# Patient Record
Sex: MALE | Race: OTHER | HISPANIC OR LATINO | URBAN - METROPOLITAN AREA
[De-identification: names, ages, dates, MRNs, and addresses within clinical notes are randomized per-mention and may not be internally consistent; named-entity substitution may affect disease eponyms.]

---

## 2024-06-20 ENCOUNTER — INPATIENT (INPATIENT)
Facility: HOSPITAL | Age: 24
LOS: 28 days | Discharge: HOME | DRG: 872 | End: 2024-07-19
Attending: INTERNAL MEDICINE | Admitting: STUDENT IN AN ORGANIZED HEALTH CARE EDUCATION/TRAINING PROGRAM
Payer: MEDICAID

## 2024-06-20 VITALS
WEIGHT: 175.05 LBS | RESPIRATION RATE: 18 BRPM | SYSTOLIC BLOOD PRESSURE: 115 MMHG | HEART RATE: 105 BPM | TEMPERATURE: 99 F | OXYGEN SATURATION: 95 % | DIASTOLIC BLOOD PRESSURE: 40 MMHG

## 2024-06-20 LAB
ALBUMIN SERPL ELPH-MCNC: 3.1 G/DL — LOW (ref 3.4–5)
ALP SERPL-CCNC: 85 U/L — SIGNIFICANT CHANGE UP (ref 40–120)
ALT FLD-CCNC: 17 U/L — SIGNIFICANT CHANGE UP (ref 12–42)
ANION GAP SERPL CALC-SCNC: 9 MMOL/L — SIGNIFICANT CHANGE UP (ref 9–16)
APPEARANCE UR: CLEAR — SIGNIFICANT CHANGE UP
APTT BLD: 30.9 SEC — SIGNIFICANT CHANGE UP (ref 24.5–35.6)
AST SERPL-CCNC: 17 U/L — SIGNIFICANT CHANGE UP (ref 15–37)
BASOPHILS # BLD AUTO: 0.05 K/UL — SIGNIFICANT CHANGE UP (ref 0–0.2)
BASOPHILS NFR BLD AUTO: 0.3 % — SIGNIFICANT CHANGE UP (ref 0–2)
BILIRUB SERPL-MCNC: 0.3 MG/DL — SIGNIFICANT CHANGE UP (ref 0.2–1.2)
BILIRUB UR-MCNC: NEGATIVE — SIGNIFICANT CHANGE UP
BUN SERPL-MCNC: 14 MG/DL — SIGNIFICANT CHANGE UP (ref 7–23)
CALCIUM SERPL-MCNC: 8.8 MG/DL — SIGNIFICANT CHANGE UP (ref 8.5–10.5)
CHLORIDE SERPL-SCNC: 101 MMOL/L — SIGNIFICANT CHANGE UP (ref 96–108)
CO2 SERPL-SCNC: 25 MMOL/L — SIGNIFICANT CHANGE UP (ref 22–31)
COLOR SPEC: YELLOW — SIGNIFICANT CHANGE UP
CREAT SERPL-MCNC: 1.15 MG/DL — SIGNIFICANT CHANGE UP (ref 0.5–1.3)
DIFF PNL FLD: NEGATIVE — SIGNIFICANT CHANGE UP
EGFR: 91 ML/MIN/1.73M2 — SIGNIFICANT CHANGE UP
EOSINOPHIL # BLD AUTO: 0.22 K/UL — SIGNIFICANT CHANGE UP (ref 0–0.5)
EOSINOPHIL NFR BLD AUTO: 1.1 % — SIGNIFICANT CHANGE UP (ref 0–6)
FLUAV AG NPH QL: SIGNIFICANT CHANGE UP
FLUBV AG NPH QL: SIGNIFICANT CHANGE UP
GLUCOSE SERPL-MCNC: 153 MG/DL — HIGH (ref 70–99)
GLUCOSE UR QL: NEGATIVE MG/DL — SIGNIFICANT CHANGE UP
GRAM STN FLD: ABNORMAL
HCT VFR BLD CALC: 41.1 % — SIGNIFICANT CHANGE UP (ref 39–50)
HGB BLD-MCNC: 14.3 G/DL — SIGNIFICANT CHANGE UP (ref 13–17)
IMM GRANULOCYTES NFR BLD AUTO: 0.5 % — SIGNIFICANT CHANGE UP (ref 0–0.9)
INR BLD: 1.41 — HIGH (ref 0.85–1.18)
KETONES UR-MCNC: ABNORMAL MG/DL
LACTATE BLDV-MCNC: 1.6 MMOL/L — SIGNIFICANT CHANGE UP (ref 0.5–2)
LEUKOCYTE ESTERASE UR-ACNC: NEGATIVE — SIGNIFICANT CHANGE UP
LYMPHOCYTES # BLD AUTO: 1.6 K/UL — SIGNIFICANT CHANGE UP (ref 1–3.3)
LYMPHOCYTES # BLD AUTO: 8.2 % — LOW (ref 13–44)
MCHC RBC-ENTMCNC: 31.9 PG — SIGNIFICANT CHANGE UP (ref 27–34)
MCHC RBC-ENTMCNC: 34.8 GM/DL — SIGNIFICANT CHANGE UP (ref 32–36)
MCV RBC AUTO: 91.7 FL — SIGNIFICANT CHANGE UP (ref 80–100)
MONOCYTES # BLD AUTO: 1.23 K/UL — HIGH (ref 0–0.9)
MONOCYTES NFR BLD AUTO: 6.3 % — SIGNIFICANT CHANGE UP (ref 2–14)
NEUTROPHILS # BLD AUTO: 16.21 K/UL — HIGH (ref 1.8–7.4)
NEUTROPHILS NFR BLD AUTO: 83.6 % — HIGH (ref 43–77)
NITRITE UR-MCNC: NEGATIVE — SIGNIFICANT CHANGE UP
NRBC # BLD: 0 /100 WBCS — SIGNIFICANT CHANGE UP (ref 0–0)
PH UR: 5.5 — SIGNIFICANT CHANGE UP (ref 5–8)
PLATELET # BLD AUTO: 310 K/UL — SIGNIFICANT CHANGE UP (ref 150–400)
POTASSIUM SERPL-MCNC: 3.3 MMOL/L — LOW (ref 3.5–5.3)
POTASSIUM SERPL-SCNC: 3.3 MMOL/L — LOW (ref 3.5–5.3)
PROT SERPL-MCNC: 7.3 G/DL — SIGNIFICANT CHANGE UP (ref 6.4–8.2)
PROT UR-MCNC: NEGATIVE MG/DL — SIGNIFICANT CHANGE UP
PROTHROM AB SERPL-ACNC: 15.4 SEC — HIGH (ref 9.5–13)
RBC # BLD: 4.48 M/UL — SIGNIFICANT CHANGE UP (ref 4.2–5.8)
RBC # FLD: 12.5 % — SIGNIFICANT CHANGE UP (ref 10.3–14.5)
RSV RNA NPH QL NAA+NON-PROBE: SIGNIFICANT CHANGE UP
SARS-COV-2 RNA SPEC QL NAA+PROBE: SIGNIFICANT CHANGE UP
SODIUM SERPL-SCNC: 135 MMOL/L — SIGNIFICANT CHANGE UP (ref 132–145)
SP GR SPEC: 1.02 — SIGNIFICANT CHANGE UP (ref 1–1.03)
SPECIMEN SOURCE: SIGNIFICANT CHANGE UP
UROBILINOGEN FLD QL: 1 MG/DL — SIGNIFICANT CHANGE UP (ref 0.2–1)
WBC # BLD: 19.4 K/UL — HIGH (ref 3.8–10.5)
WBC # FLD AUTO: 19.4 K/UL — HIGH (ref 3.8–10.5)

## 2024-06-20 PROCEDURE — 71045 X-RAY EXAM CHEST 1 VIEW: CPT | Mod: 26

## 2024-06-20 PROCEDURE — 99285 EMERGENCY DEPT VISIT HI MDM: CPT

## 2024-06-20 PROCEDURE — 72132 CT LUMBAR SPINE W/DYE: CPT | Mod: 26,MC

## 2024-06-20 RX ORDER — VANCOMYCIN HYDROCHLORIDE 50 MG/ML
1250 KIT ORAL ONCE
Refills: 0 | Status: COMPLETED | OUTPATIENT
Start: 2024-06-20 | End: 2024-06-20

## 2024-06-20 RX ORDER — ACETAMINOPHEN 325 MG
650 TABLET ORAL ONCE
Refills: 0 | Status: COMPLETED | OUTPATIENT
Start: 2024-06-20 | End: 2024-06-20

## 2024-06-20 RX ORDER — VANCOMYCIN HYDROCHLORIDE 50 MG/ML
1000 KIT ORAL ONCE
Refills: 0 | Status: DISCONTINUED | OUTPATIENT
Start: 2024-06-20 | End: 2024-06-20

## 2024-06-20 RX ORDER — CEFTRIAXONE SODIUM 500 MG
1000 VIAL (EA) INJECTION ONCE
Refills: 0 | Status: COMPLETED | OUTPATIENT
Start: 2024-06-20 | End: 2024-06-20

## 2024-06-20 RX ORDER — SODIUM CHLORIDE 0.9 % (FLUSH) 0.9 %
2500 SYRINGE (ML) INJECTION ONCE
Refills: 0 | Status: COMPLETED | OUTPATIENT
Start: 2024-06-20 | End: 2024-06-20

## 2024-06-20 RX ORDER — MORPHINE SULFATE 100 MG/1
4 TABLET, EXTENDED RELEASE ORAL ONCE
Refills: 0 | Status: DISCONTINUED | OUTPATIENT
Start: 2024-06-20 | End: 2024-06-20

## 2024-06-20 RX ADMIN — Medication 600 MILLIGRAM(S): at 16:44

## 2024-06-20 RX ADMIN — VANCOMYCIN HYDROCHLORIDE 1250 MILLIGRAM(S): KIT at 22:28

## 2024-06-20 RX ADMIN — Medication 600 MILLIGRAM(S): at 20:44

## 2024-06-20 RX ADMIN — Medication 100 MILLIGRAM(S): at 09:21

## 2024-06-20 RX ADMIN — VANCOMYCIN HYDROCHLORIDE 1250 MILLIGRAM(S): KIT at 20:44

## 2024-06-20 RX ADMIN — Medication 600 MILLIGRAM(S): at 22:35

## 2024-06-20 RX ADMIN — Medication 2500 MILLILITER(S): at 20:44

## 2024-06-20 RX ADMIN — MORPHINE SULFATE 4 MILLIGRAM(S): 100 TABLET, EXTENDED RELEASE ORAL at 20:44

## 2024-06-20 RX ADMIN — Medication 650 MILLIGRAM(S): at 23:32

## 2024-06-20 RX ADMIN — Medication 1000 MILLIGRAM(S): at 20:43

## 2024-06-20 RX ADMIN — Medication 650 MILLIGRAM(S): at 20:43

## 2024-06-20 RX ADMIN — Medication 650 MILLIGRAM(S): at 16:44

## 2024-06-20 RX ADMIN — Medication 2500 MILLILITER(S): at 09:27

## 2024-06-20 RX ADMIN — Medication 600 MILLIGRAM(S): at 09:19

## 2024-06-20 RX ADMIN — MORPHINE SULFATE 4 MILLIGRAM(S): 100 TABLET, EXTENDED RELEASE ORAL at 10:18

## 2024-06-20 RX ADMIN — Medication 650 MILLIGRAM(S): at 22:35

## 2024-06-20 RX ADMIN — VANCOMYCIN HYDROCHLORIDE 166.67 MILLIGRAM(S): KIT at 09:46

## 2024-06-20 RX ADMIN — Medication 600 MILLIGRAM(S): at 23:33

## 2024-06-20 RX ADMIN — Medication 650 MILLIGRAM(S): at 09:19

## 2024-06-20 RX ADMIN — VANCOMYCIN HYDROCHLORIDE 166.67 MILLIGRAM(S): KIT at 20:47

## 2024-06-20 NOTE — SBIRT NOTE ADULT - NSSBIRTALCPOSREINDET_GEN_A_CORE
Patient reports minimal social ETOH use. Patient reports 1-2 times per month. No resources wanted/needed at this time.

## 2024-06-20 NOTE — ED PROVIDER NOTE - PHYSICAL EXAMINATION
VSS in NAD non toxic appearing   NCAT EOMI PERRL OP clear  heart RRR no murmur   lungs CTA no wheezing no rales no rhonchi   abd soft NT ND no CVAT no guarding no rebound   Back with open wound, no fluctuance, no obvious abscess, surrounding area tender to touch with open wound with granulation tissue.  Mild surrounding erythema and induration, tender to touch with no fluctuance.  normal neuro exam CN I-XII grossly intact, no groos motor or sensory deficits  no peripheral c/c/e

## 2024-06-20 NOTE — ED ADULT TRIAGE NOTE - CHIEF COMPLAINT QUOTE
Pt BIBEMS from street complaining of wound check to lower back. + drainage. + redness. Pt states that he popped it earlier today. Admits to  using crack today.

## 2024-06-20 NOTE — ED PROVIDER NOTE - OBJECTIVE STATEMENT
23-year-old male with history of IV drug use, cocaine and crystal meth, with complaints of painful ulceration with purulent drainage of mid lower back, associated with fever/chills, mild nausea no vomiting, fatigue weakness and severe pain.  He has been squeezing the lesion trying to get the pus out but the wound appears to be increasing in size and draining thick pus. Sepsis criteria alerted on arrival to the ED and IV antibiotics started.

## 2024-06-20 NOTE — SBIRT NOTE ADULT - NSSBIRTDRGPASSREFTXDET_GEN_A_CORE
Patient reports use of cocaine and meth about 2 times per week. Education was provided to patient. Patient requesting for substance abuse resources. SW to provide print you resources available to the patient.

## 2024-06-21 DIAGNOSIS — E83.39 OTHER DISORDERS OF PHOSPHORUS METABOLISM: ICD-10-CM

## 2024-06-21 DIAGNOSIS — L02.91 CUTANEOUS ABSCESS, UNSPECIFIED: ICD-10-CM

## 2024-06-21 DIAGNOSIS — F19.10 OTHER PSYCHOACTIVE SUBSTANCE ABUSE, UNCOMPLICATED: ICD-10-CM

## 2024-06-21 DIAGNOSIS — E87.6 HYPOKALEMIA: ICD-10-CM

## 2024-06-21 DIAGNOSIS — L03.90 CELLULITIS, UNSPECIFIED: ICD-10-CM

## 2024-06-21 DIAGNOSIS — F19.20 OTHER PSYCHOACTIVE SUBSTANCE DEPENDENCE, UNCOMPLICATED: ICD-10-CM

## 2024-06-21 DIAGNOSIS — Z20.828 CONTACT WITH AND (SUSPECTED) EXPOSURE TO OTHER VIRAL COMMUNICABLE DISEASES: ICD-10-CM

## 2024-06-21 DIAGNOSIS — Z59.00 HOMELESSNESS UNSPECIFIED: ICD-10-CM

## 2024-06-21 DIAGNOSIS — A41.9 SEPSIS, UNSPECIFIED ORGANISM: ICD-10-CM

## 2024-06-21 DIAGNOSIS — R78.81 BACTEREMIA: ICD-10-CM

## 2024-06-21 DIAGNOSIS — Z29.9 ENCOUNTER FOR PROPHYLACTIC MEASURES, UNSPECIFIED: ICD-10-CM

## 2024-06-21 LAB
ALBUMIN SERPL ELPH-MCNC: 3.3 G/DL — SIGNIFICANT CHANGE UP (ref 3.3–5)
ALP SERPL-CCNC: 77 U/L — SIGNIFICANT CHANGE UP (ref 40–120)
ALT FLD-CCNC: 25 U/L — SIGNIFICANT CHANGE UP (ref 10–45)
AMPHET UR-MCNC: NEGATIVE — SIGNIFICANT CHANGE UP
ANION GAP SERPL CALC-SCNC: 7 MMOL/L — SIGNIFICANT CHANGE UP (ref 5–17)
AST SERPL-CCNC: 32 U/L — SIGNIFICANT CHANGE UP (ref 10–40)
BARBITURATES UR SCN-MCNC: NEGATIVE — SIGNIFICANT CHANGE UP
BASOPHILS # BLD AUTO: 0.03 K/UL — SIGNIFICANT CHANGE UP (ref 0–0.2)
BASOPHILS NFR BLD AUTO: 0.4 % — SIGNIFICANT CHANGE UP (ref 0–2)
BENZODIAZ UR-MCNC: NEGATIVE — SIGNIFICANT CHANGE UP
BILIRUB SERPL-MCNC: 0.2 MG/DL — SIGNIFICANT CHANGE UP (ref 0.2–1.2)
BUN SERPL-MCNC: 5 MG/DL — LOW (ref 7–23)
CALCIUM SERPL-MCNC: 8.8 MG/DL — SIGNIFICANT CHANGE UP (ref 8.4–10.5)
CHLORIDE SERPL-SCNC: 105 MMOL/L — SIGNIFICANT CHANGE UP (ref 96–108)
CO2 SERPL-SCNC: 24 MMOL/L — SIGNIFICANT CHANGE UP (ref 22–31)
COCAINE METAB.OTHER UR-MCNC: POSITIVE
CREAT SERPL-MCNC: 0.8 MG/DL — SIGNIFICANT CHANGE UP (ref 0.5–1.3)
EGFR: 126 ML/MIN/1.73M2 — SIGNIFICANT CHANGE UP
EOSINOPHIL # BLD AUTO: 0.12 K/UL — SIGNIFICANT CHANGE UP (ref 0–0.5)
EOSINOPHIL NFR BLD AUTO: 1.4 % — SIGNIFICANT CHANGE UP (ref 0–6)
GLUCOSE SERPL-MCNC: 135 MG/DL — HIGH (ref 70–99)
GRAM STN FLD: ABNORMAL
HAV IGM SER-ACNC: SIGNIFICANT CHANGE UP
HBV CORE AB SER-ACNC: SIGNIFICANT CHANGE UP
HBV CORE IGM SER-ACNC: SIGNIFICANT CHANGE UP
HBV SURFACE AB SER-ACNC: SIGNIFICANT CHANGE UP
HBV SURFACE AG SER-ACNC: SIGNIFICANT CHANGE UP
HCT VFR BLD CALC: 43.2 % — SIGNIFICANT CHANGE UP (ref 39–50)
HCV AB S/CO SERPL IA: 0.06 S/CO — SIGNIFICANT CHANGE UP
HCV AB SERPL-IMP: SIGNIFICANT CHANGE UP
HGB BLD-MCNC: 14.5 G/DL — SIGNIFICANT CHANGE UP (ref 13–17)
HIV 1+2 AB+HIV1 P24 AG SERPL QL IA: SIGNIFICANT CHANGE UP
IMM GRANULOCYTES NFR BLD AUTO: 0.5 % — SIGNIFICANT CHANGE UP (ref 0–0.9)
LYMPHOCYTES # BLD AUTO: 0.9 K/UL — LOW (ref 1–3.3)
LYMPHOCYTES # BLD AUTO: 10.6 % — LOW (ref 13–44)
MAGNESIUM SERPL-MCNC: 1.9 MG/DL — SIGNIFICANT CHANGE UP (ref 1.6–2.6)
MCHC RBC-ENTMCNC: 31.3 PG — SIGNIFICANT CHANGE UP (ref 27–34)
MCHC RBC-ENTMCNC: 33.6 GM/DL — SIGNIFICANT CHANGE UP (ref 32–36)
MCV RBC AUTO: 93.3 FL — SIGNIFICANT CHANGE UP (ref 80–100)
METHADONE UR-MCNC: NEGATIVE — SIGNIFICANT CHANGE UP
METHOD TYPE: SIGNIFICANT CHANGE UP
MONOCYTES # BLD AUTO: 0.53 K/UL — SIGNIFICANT CHANGE UP (ref 0–0.9)
MONOCYTES NFR BLD AUTO: 6.2 % — SIGNIFICANT CHANGE UP (ref 2–14)
MRSA SPEC QL CULT: SIGNIFICANT CHANGE UP
NEUTROPHILS # BLD AUTO: 6.91 K/UL — SIGNIFICANT CHANGE UP (ref 1.8–7.4)
NEUTROPHILS NFR BLD AUTO: 80.9 % — HIGH (ref 43–77)
NRBC # BLD: 0 /100 WBCS — SIGNIFICANT CHANGE UP (ref 0–0)
OPIATES UR-MCNC: NEGATIVE — SIGNIFICANT CHANGE UP
PCP SPEC-MCNC: SIGNIFICANT CHANGE UP
PCP UR-MCNC: NEGATIVE — SIGNIFICANT CHANGE UP
PHOSPHATE SERPL-MCNC: 1.7 MG/DL — LOW (ref 2.5–4.5)
PLATELET # BLD AUTO: 244 K/UL — SIGNIFICANT CHANGE UP (ref 150–400)
POTASSIUM SERPL-MCNC: 4.4 MMOL/L — SIGNIFICANT CHANGE UP (ref 3.5–5.3)
POTASSIUM SERPL-SCNC: 4.4 MMOL/L — SIGNIFICANT CHANGE UP (ref 3.5–5.3)
PROT SERPL-MCNC: 6.4 G/DL — SIGNIFICANT CHANGE UP (ref 6–8.3)
RBC # BLD: 4.63 M/UL — SIGNIFICANT CHANGE UP (ref 4.2–5.8)
RBC # FLD: 13.3 % — SIGNIFICANT CHANGE UP (ref 10.3–14.5)
SODIUM SERPL-SCNC: 136 MMOL/L — SIGNIFICANT CHANGE UP (ref 135–145)
SPECIMEN SOURCE: SIGNIFICANT CHANGE UP
THC UR QL: POSITIVE
VANCOMYCIN TROUGH SERPL-MCNC: 10 UG/ML — SIGNIFICANT CHANGE UP (ref 10–20)
WBC # BLD: 8.53 K/UL — SIGNIFICANT CHANGE UP (ref 3.8–10.5)
WBC # FLD AUTO: 8.53 K/UL — SIGNIFICANT CHANGE UP (ref 3.8–10.5)

## 2024-06-21 PROCEDURE — 99223 1ST HOSP IP/OBS HIGH 75: CPT | Mod: GC

## 2024-06-21 PROCEDURE — 99254 IP/OBS CNSLTJ NEW/EST MOD 60: CPT

## 2024-06-21 RX ORDER — PANTOPRAZOLE SODIUM 40 MG/10ML
40 INJECTION, POWDER, FOR SOLUTION INTRAVENOUS
Refills: 0 | Status: DISCONTINUED | OUTPATIENT
Start: 2024-06-21 | End: 2024-07-19

## 2024-06-21 RX ORDER — ENOXAPARIN SODIUM 100 MG/ML
40 INJECTION SUBCUTANEOUS EVERY 24 HOURS
Refills: 0 | Status: DISCONTINUED | OUTPATIENT
Start: 2024-06-21 | End: 2024-07-19

## 2024-06-21 RX ORDER — VANCOMYCIN HYDROCHLORIDE 50 MG/ML
1250 KIT ORAL EVERY 8 HOURS
Refills: 0 | Status: DISCONTINUED | OUTPATIENT
Start: 2024-06-21 | End: 2024-06-22

## 2024-06-21 RX ORDER — CEFAZOLIN 10 G/1
2000 INJECTION, POWDER, FOR SOLUTION INTRAVENOUS EVERY 8 HOURS
Refills: 0 | Status: DISCONTINUED | OUTPATIENT
Start: 2024-06-21 | End: 2024-06-21

## 2024-06-21 RX ORDER — OXYCODONE HYDROCHLORIDE 100 MG/5ML
2.5 SOLUTION ORAL EVERY 4 HOURS
Refills: 0 | Status: DISCONTINUED | OUTPATIENT
Start: 2024-06-21 | End: 2024-06-27

## 2024-06-21 RX ORDER — POTASSIUM CHLORIDE 600 MG/1
40 TABLET, FILM COATED, EXTENDED RELEASE ORAL
Refills: 0 | Status: COMPLETED | OUTPATIENT
Start: 2024-06-21 | End: 2024-06-21

## 2024-06-21 RX ORDER — VANCOMYCIN HYDROCHLORIDE 50 MG/ML
1250 KIT ORAL ONCE
Refills: 0 | Status: COMPLETED | OUTPATIENT
Start: 2024-06-21 | End: 2024-06-21

## 2024-06-21 RX ORDER — ACETAMINOPHEN 325 MG
650 TABLET ORAL EVERY 6 HOURS
Refills: 0 | Status: DISCONTINUED | OUTPATIENT
Start: 2024-06-21 | End: 2024-07-19

## 2024-06-21 RX ORDER — MAGNESIUM SULFATE 100 %
1 POWDER (GRAM) MISCELLANEOUS ONCE
Refills: 0 | Status: COMPLETED | OUTPATIENT
Start: 2024-06-21 | End: 2024-06-21

## 2024-06-21 RX ORDER — VANCOMYCIN HYDROCHLORIDE 50 MG/ML
1250 KIT ORAL ONCE
Refills: 0 | Status: DISCONTINUED | OUTPATIENT
Start: 2024-06-21 | End: 2024-06-21

## 2024-06-21 RX ADMIN — POTASSIUM CHLORIDE 40 MILLIEQUIVALENT(S): 600 TABLET, FILM COATED, EXTENDED RELEASE ORAL at 06:02

## 2024-06-21 RX ADMIN — Medication 400 MILLIGRAM(S): at 11:10

## 2024-06-21 RX ADMIN — Medication 400 MILLIGRAM(S): at 05:00

## 2024-06-21 RX ADMIN — Medication 400 MILLIGRAM(S): at 04:00

## 2024-06-21 RX ADMIN — VANCOMYCIN HYDROCHLORIDE 166.67 MILLIGRAM(S): KIT at 12:20

## 2024-06-21 RX ADMIN — VANCOMYCIN HYDROCHLORIDE 166.67 MILLIGRAM(S): KIT at 19:27

## 2024-06-21 RX ADMIN — CEFAZOLIN 100 MILLIGRAM(S): 10 INJECTION, POWDER, FOR SOLUTION INTRAVENOUS at 06:00

## 2024-06-21 RX ADMIN — Medication 400 MILLIGRAM(S): at 20:02

## 2024-06-21 RX ADMIN — ENOXAPARIN SODIUM 40 MILLIGRAM(S): 100 INJECTION SUBCUTANEOUS at 06:01

## 2024-06-21 RX ADMIN — Medication 400 MILLIGRAM(S): at 19:02

## 2024-06-21 RX ADMIN — Medication 400 MILLIGRAM(S): at 10:10

## 2024-06-21 RX ADMIN — Medication 100 GRAM(S): at 10:10

## 2024-06-21 RX ADMIN — POTASSIUM CHLORIDE 40 MILLIEQUIVALENT(S): 600 TABLET, FILM COATED, EXTENDED RELEASE ORAL at 04:00

## 2024-06-21 RX ADMIN — PANTOPRAZOLE SODIUM 40 MILLIGRAM(S): 40 INJECTION, POWDER, FOR SOLUTION INTRAVENOUS at 06:42

## 2024-06-21 SDOH — ECONOMIC STABILITY - HOUSING INSECURITY: HOMELESSNESS UNSPECIFIED: Z59.00

## 2024-06-21 NOTE — PATIENT PROFILE ADULT - FALL HARM RISK - UNIVERSAL INTERVENTIONS
Bed in lowest position, wheels locked, appropriate side rails in place/Call bell, personal items and telephone in reach/Instruct patient to call for assistance before getting out of bed or chair/Non-slip footwear when patient is out of bed/[place] to call system/Physically safe environment - no spills, clutter or unnecessary equipment/Purposeful Proactive Rounding/Room/bathroom lighting operational, light cord in reach

## 2024-06-21 NOTE — H&P ADULT - PROBLEM SELECTOR PLAN 2
Patient with area of swelling, redness, erythema near sacrum.  On exam 3cm x 5cm area of ulceration with purulence and surrounding redness.  CT without evidence of abscess, shows midline dermal thickening with small central depression/laceration in the mid sacral region. Underlying infiltration of the subcutaneous fat and subcutaneous edema extending superiorly to the L1-L2 level.    - Plan as above

## 2024-06-21 NOTE — PROGRESS NOTE ADULT - SUBJECTIVE AND OBJECTIVE BOX
Consultation Requested by: medicine    Patient is a 23y old  Male who presents with a chief complaint of   HPI:  23-year-old male with substance use disorder with complaints of painful ulceration with purulent drainage of mid lower back.  He reports the lesion started 4 days ago, was initially small, he picked at it after which it grew in size and started to drain.  He reports fever, chills, no nausea, vomiting. For the last 2 days pain has been too intense that he hasn't been able to lie on his back.  Reports he smokes cocaine and meth but no IV drug use.  He does not have any medical conditions, does not take any medications.  ROS other medications.     ED Course:  Vitals: Temp 99.2,  -->90, /40, RR 18: SaO2 95% room air  Labs: WBC 19.4, K 3.3, Lactate 1.6, Abscess culture gram positive cocci in pairs  CT Lumbar Spine: Midline dermal thickening with small central depression/laceration in the mid sacral region. Underlying infiltration of the subcutaneous fat and subcutaneous edema extending superiorly to the L1-L2 level.   Intervention: Tylenol 650mg po x 5, Ibuprofen 600mg po x 3, Morphine 4mg IV, Percocet 5/325, Ceftriaxone 1g IV, Vancomycin 1250mg IV x 2, NaCl bolus 2.5     (21 Jun 2024 01:50)    Subjective:  Patient reports he had a small wound possibly infected hair follicle which quickly began enlarging and becoming more painful over the course of 4 days. Was using creams which did not help, pain got so severe he came to the ED.   Denies fever, chills. Other ROS negative. Has other scattered lesions over his body which he reports have been slow to heal. Denies IVDU.         Allergies    No Known Allergies    Intolerances      Antimicrobials:      Other Medications:  acetaminophen     Tablet .. 650 milliGRAM(s) Oral every 6 hours PRN  enoxaparin Injectable 40 milliGRAM(s) SubCutaneous every 24 hours  ibuprofen  Tablet. 400 milliGRAM(s) Oral every 6 hours PRN  oxyCODONE    IR 2.5 milliGRAM(s) Oral every 4 hours PRN  pantoprazole    Tablet 40 milliGRAM(s) Oral before breakfast      FAMILY HISTORY:    PAST MEDICAL & SURGICAL HISTORY:    SOCIAL HISTORY:    IMMUNIZATIONS  [] Up to Date		[] Not Up to Date:  Recent Immunizations:	[] No	[] Yes:    Daily Height in cm: 167.64 (21 Jun 2024 00:34)    Daily   Head Circumference:  Vital Signs Last 24 Hrs  T(C): 36.9 (21 Jun 2024 09:52), Max: 39.4 (20 Jun 2024 17:05)  T(F): 98.4 (21 Jun 2024 09:52), Max: 103 (20 Jun 2024 17:05)  HR: 100 (21 Jun 2024 09:52) (74 - 101)  BP: 120/68 (21 Jun 2024 09:52) (100/55 - 129/70)  BP(mean): --  RR: 18 (21 Jun 2024 09:52) (17 - 20)  SpO2: 97% (21 Jun 2024 09:52) (95% - 98%)    Parameters below as of 21 Jun 2024 09:52  Patient On (Oxygen Delivery Method): room air        PHYSICAL EXAM  CONSTITUTIONAL: Well groomed, no apparent distress  EYES: No conjunctival or scleral injection, non-icteric  ENMT: Oral mucosa with moist membranes. Normal dentition; no pharyngeal injection or exudates  RESP: No respiratory distress, no use of accessory muscles; CTA b/l, no WRR  CV: RRR, +S1S2, no MRG; no JVD; no peripheral edema  GI: Soft, NT, ND, no rebound, no guarding; no palpable masses; no hepatosplenomegaly; no hernia palpated  SKIN: approx 5x5cm raised, erythematous, tender wound with purulence, no fluctuance located just above gluteal cleft; multiple scattered wounds throughout arms and legs appearing to be infected hair follicles, tender, purulent fluid draining  PSYCH: Appropriate insight/judgment; A+O x 3, mood and affect appropriate, recent/remote memory intact      Lab Results:                        14.5   8.53  )-----------( 244      ( 21 Jun 2024 07:11 )             43.2     06-21    136  |  105  |  5<L>  ----------------------------<  135<H>  4.4   |  24  |  0.80    Ca    8.8      21 Jun 2024 07:11  Phos  1.7     06-21  Mg     1.9     06-21    TPro  6.4  /  Alb  3.3  /  TBili  0.2  /  DBili  x   /  AST  32  /  ALT  25  /  AlkPhos  77  06-21    LIVER FUNCTIONS - ( 21 Jun 2024 07:11 )  Alb: 3.3 g/dL / Pro: 6.4 g/dL / ALK PHOS: 77 U/L / ALT: 25 U/L / AST: 32 U/L / GGT: x           PT/INR - ( 20 Jun 2024 08:53 )   PT: 15.4 sec;   INR: 1.41          PTT - ( 20 Jun 2024 08:53 )  PTT:30.9 sec  Urinalysis Basic - ( 21 Jun 2024 07:11 )    Color: x / Appearance: x / SG: x / pH: x  Gluc: 135 mg/dL / Ketone: x  / Bili: x / Urobili: x   Blood: x / Protein: x / Nitrite: x   Leuk Esterase: x / RBC: x / WBC x   Sq Epi: x / Non Sq Epi: x / Bacteria: x

## 2024-06-21 NOTE — PROGRESS NOTE ADULT - ASSESSMENT
23-year-old male with substance use disorder (no IVDU) presents complaints of painful ulceration with purulent drainage of mid lower back, admitted for sepsis 2/2 cellulitis.     6/20 Blood cx: MRSA  6/20 Wound cx - gram stain gram pos cocci in pairs    # MRSA Bacteremia  # Folliculitis  Pt presenting for 4 days of worsening wound in sacral area - what began as infected hair follicle/pimple > pt popped it at home and wound began to rapidly increase in size and become more painful. It is draining purulent fluid. Suspect that skin wound served as point of entry for MRSA into bloodstream, blood cxs now growing MRSA. Pt denies IVDU. Afebrile, WBC downtrending. On exam, wound is erythematous with purulence and no areas of fluctuance. Pt also w scattered wounds throughout arms and legs.   Recommendations:   - increase vancomycin to 1250mg IV q8  - vanc trough 6/22 12pm  - surveillance blood cxs until clear    ID team 1 will continue to follow  Case discussed with attending physician Dr Shaw

## 2024-06-21 NOTE — H&P ADULT - ASSESSMENT
23-year-old male with substance use disorder with complaints of painful ulceration with purulent drainage of mid lower back.

## 2024-06-21 NOTE — PROGRESS NOTE ADULT - PROBLEM SELECTOR PLAN 2
skin lesions initially c/f monkeypox, but now less likely given +MRSA bacteremia  -- cont. airborne precautions for now  -- f/u Mpox PCR  -- appreciate ID recs

## 2024-06-21 NOTE — H&P ADULT - ATTENDING COMMENTS
Pt. seen and examined by me earlier today; I have read Dr. Pisano's H&P, I agree w/ her findings and plan of care as documented; see my progress note for updates

## 2024-06-21 NOTE — PROGRESS NOTE ADULT - PROBLEM SELECTOR PLAN 3
Pt. reports smoking crack cocaine and meth; he denies IVDA  -- monitor for withdrawal  -- SBIRT social work eval Pt. reports smoking crack cocaine and meth; he denies IVDA  -- monitor for withdrawal  -- SBIRT social work eval  -- check Utox

## 2024-06-21 NOTE — PROGRESS NOTE ADULT - PROBLEM SELECTOR PLAN 2
Patient met 2 SIRS (, WBC 19.4) on admission likely in setting of cellulitis.  Lactate 1.6,  In the ED he received Ceftriaxone 1g IV and Vancomycin 1250mg IV x 2.  On exam 3cm x 5cm area of ulceration with purulence and surrounding redness.  Wound culture with gram positive cocci in pairs.  CT does not show abscess.   - Continue Vancomycin given purulence, trough 6/21 8pm prior to 4th dose  - Follow up blood culture and wound culture sensitivity  - Wound care consult for suggestion on dressing

## 2024-06-21 NOTE — PATIENT PROFILE ADULT - NSPRESCRALCSIXMORE_GEN_A_NUR
Anesthesia Transfer of Care Note    Patient: Conner Perez III    Procedure(s) Performed: Procedure(s) (LRB):  AMPUTATION-TOE (Left)    Patient location: PACU    Anesthesia Type: MAC    Transport from OR: Transported from OR on room air with adequate spontaneous ventilation    Post pain: adequate analgesia    Post assessment: no apparent anesthetic complications    Post vital signs: stable    Level of consciousness: awake    Nausea/Vomiting: no nausea/vomiting    Complications: none          Last vitals: There were no vitals taken for this visit.  
Never

## 2024-06-21 NOTE — PROVIDER CONTACT NOTE (CRITICAL VALUE NOTIFICATION) - SITUATION
Critical value received from [facility] regarding blood culture and abscess culture

## 2024-06-21 NOTE — PROGRESS NOTE ADULT - PROBLEM SELECTOR PLAN 1
sepsis POA (WBC, HR); BCx 6/20 growing MRSA in 2/2 bottles; source likely skin (wound above gluteal cleft; folliculitis of extremities); no e/o abscess on CT L-spine; HIV negative; WBC normalized on vanc  -- cont. vanc, dose per level  -- supportive care  -- f/u BCx sensitivities, repeat BCx, and wound cx (back wound)  -- check TTE, evaluate for vegetation  -- f/u ID recs

## 2024-06-21 NOTE — H&P ADULT - NSHPLABSRESULTS_GEN_ALL_CORE
LABS:                        14.3   19.40 )-----------( 310      ( 2024 08:53 )             41.1     06-20    135  |  101  |  14  ----------------------------<  153<H>  3.3<L>   |  25  |  1.15    Ca    8.8      2024 08:53    TPro  7.3  /  Alb  3.1<L>  /  TBili  0.3  /  DBili  x   /  AST  17  /  ALT  17  /  AlkPhos  85  06-20    PT/INR - ( 2024 08:53 )   PT: 15.4 sec;   INR: 1.41          PTT - ( 2024 08:53 )  PTT:30.9 sec  Urinalysis Basic - ( 2024 08:53 )    Color: Yellow / Appearance: Clear / S.022 / pH: x  Gluc: 153 mg/dL / Ketone: Trace mg/dL  / Bili: Negative / Urobili: 1.0 mg/dL   Blood: x / Protein: Negative mg/dL / Nitrite: Negative   Leuk Esterase: Negative / RBC: x / WBC x   Sq Epi: x / Non Sq Epi: x / Bacteria: x    RADIOLOGY & ADDITIONAL TESTS:  Reviewed

## 2024-06-21 NOTE — H&P ADULT - PROBLEM SELECTOR PLAN 5
F: s/p 2.5L NS   E: replete PRN K>4, Mg> 2  N: regular diet  GI: none:   DVT: Lovenox   Code: Full  Dispo: POLI

## 2024-06-21 NOTE — PROGRESS NOTE ADULT - SUBJECTIVE AND OBJECTIVE BOX
Patient is a 23y old  Male who presents with a chief complaint of     INTERVAL HPI/OVERNIGHT EVENTS:    Pt. seen and examined earlier today  Pt. wanted to sleep, no complaints elicited  No fevers    Review of Systems: 12 point review of systems otherwise negative    MEDICATIONS  (STANDING):  enoxaparin Injectable 40 milliGRAM(s) SubCutaneous every 24 hours  pantoprazole    Tablet 40 milliGRAM(s) Oral before breakfast    MEDICATIONS  (PRN):  acetaminophen     Tablet .. 650 milliGRAM(s) Oral every 6 hours PRN Mild Pain (1 - 3)  ibuprofen  Tablet. 400 milliGRAM(s) Oral every 6 hours PRN Moderate Pain (4 - 6)  oxyCODONE    IR 2.5 milliGRAM(s) Oral every 4 hours PRN Severe Pain (7 - 10)      Allergies    No Known Allergies    Intolerances          Vital Signs Last 24 Hrs  T(C): 36.9 (21 Jun 2024 09:52), Max: 39.4 (20 Jun 2024 17:05)  T(F): 98.4 (21 Jun 2024 09:52), Max: 103 (20 Jun 2024 17:05)  HR: 100 (21 Jun 2024 09:52) (74 - 101)  BP: 120/68 (21 Jun 2024 09:52) (100/55 - 129/70)  BP(mean): --  RR: 18 (21 Jun 2024 09:52) (17 - 20)  SpO2: 97% (21 Jun 2024 09:52) (95% - 98%)    Parameters below as of 21 Jun 2024 09:52  Patient On (Oxygen Delivery Method): room air      CAPILLARY BLOOD GLUCOSE            Physical Exam:  (earlier today)  Daily Height in cm: 167.64 (21 Jun 2024 00:34)    Daily   General:  non-toxic appearing in NAD, sleeping on side  HEENT:  MMM  CV:  RRR  Lungs:  CTA B/L  Abdomen:  soft NT ND  Extremities:  no edema B/L LE  Skin:  WWP, scattered pustules ?folliculitis arms and legs  back exam deferred; per housestaff exam, ~5x5cm open wound above gluteal cleft w/ mild surrounding erythema, purulence, tenderness, no fluctuance   Neuro:  A&Ox3      LABS:                        14.5   8.53  )-----------( 244      ( 21 Jun 2024 07:11 )             43.2     06-21    136  |  105  |  5<L>  ----------------------------<  135<H>  4.4   |  24  |  0.80    Ca    8.8      21 Jun 2024 07:11  Phos  1.7     06-21  Mg     1.9     06-21    TPro  6.4  /  Alb  3.3  /  TBili  0.2  /  DBili  x   /  AST  32  /  ALT  25  /  AlkPhos  77  06-21    PT/INR - ( 20 Jun 2024 08:53 )   PT: 15.4 sec;   INR: 1.41          PTT - ( 20 Jun 2024 08:53 )  PTT:30.9 sec  Urinalysis Basic - ( 21 Jun 2024 07:11 )    Color: x / Appearance: x / SG: x / pH: x  Gluc: 135 mg/dL / Ketone: x  / Bili: x / Urobili: x   Blood: x / Protein: x / Nitrite: x   Leuk Esterase: x / RBC: x / WBC x   Sq Epi: x / Non Sq Epi: x / Bacteria: x

## 2024-06-21 NOTE — H&P ADULT - PROBLEM SELECTOR PLAN 1
Patient met 2 SIRS (, WBC 19.4) on admission likely in setting of  skin and soft tissue infection.  Lactate 1.6,  In the ED he received Ceftriaxone 1g IV and Vancomycin 1250mg IV x 2.  On exam 3cn x 5cm area of ulceration with purulence and surrounding redness.  Wound culture with gram positive cocci in pairs.    - Continue Vancomycin given purulence, trough 6/21 8pm prior to 4th dose  - Follow up blood culture and wound culture sensitivity  - Wound care consult for suggestion on dressing Patient met 2 SIRS (, WBC 19.4) on admission likely in setting of cellulitis.  Lactate 1.6,  In the ED he received Ceftriaxone 1g IV and Vancomycin 1250mg IV x 2.  On exam 3cm x 5cm area of ulceration with purulence and surrounding redness.  Wound culture with gram positive cocci in pairs.  CT does not show abscess.   - Continue Vancomycin given purulence, trough 6/21 8pm prior to 4th dose  - Follow up blood culture and wound culture sensitivity  - Wound care consult for suggestion on dressing

## 2024-06-21 NOTE — H&P ADULT - PROBLEM SELECTOR PLAN 3
Patient with multiple maculopapular skin lesions concerning for possible money pox.    - Follow up money pox CPR

## 2024-06-21 NOTE — H&P ADULT - HISTORY OF PRESENT ILLNESS
23-year-old male with history of IV drug use, cocaine and crystal meth, with complaints of painful ulceration with purulent drainage of mid lower back, associated with fever/chills, mild nausea no vomiting, fatigue weakness and severe pain.  He has been squeezing the lesion trying to get the pus out but the wound appears to be increasing in size and draining thick pus. Sepsis criteria alerted on arrival to the ED and IV antibiotics started.    ED Course:  Vitals: Temp 99.2,  -->90, /40, RR 18: SaO2 95% room air  Labs: WBC 19.4, K 3.3, Lactate 1.6, Abscess culture gram positive cocci in pairs  CT Lumbar Spine: Midline dermal thickening with small central depression/laceration in the mid sacral region. Underlying infiltration of the subcutaneous fat and subcutaneous edema extending superiorly to the L1-L2 level.   Intervention: Tylenol 650mg po x 5,        23-year-old male with history of IV drug use, cocaine and crystal meth, with complaints of painful ulceration with purulent drainage of mid lower back, associated with fever/chills, mild nausea no vomiting, fatigue weakness and severe pain.  He has been squeezing the lesion trying to get the pus out but the wound appears to be increasing in size and draining thick pus. Sepsis criteria alerted on arrival to the ED and IV antibiotics started.    ED Course:  Vitals: Temp 99.2,  -->90, /40, RR 18: SaO2 95% room air  Labs: WBC 19.4, K 3.3, Lactate 1.6, Abscess culture gram positive cocci in pairs  CT Lumbar Spine: Midline dermal thickening with small central depression/laceration in the mid sacral region. Underlying infiltration of the subcutaneous fat and subcutaneous edema extending superiorly to the L1-L2 level.   Intervention: Tylenol 650mg po x 5, Ibuprofen 600mg po x 3, Morphine 4mg IV, Percocet 5/325, Ceftriaxone 1g IV, Vancomycin 1250mg IV x 2, NaCl bolus 2.5     23-year-old male with substance use disorder with complaints of painful ulceration with purulent drainage of mid lower back.  He reports the lesion started 4 days ago, was initially small, he picked at it after which it grew in size and started to drain.  He reports fever, chills, no nausea, vomiting. For the last 2 days pain has been too intense that he hasn't been able to lie on his back.  Reports he smokes cocaine and meth but no IV drug use.  He does not have any medical conditions, does not take any medications.  ROS other medications.     ED Course:  Vitals: Temp 99.2,  -->90, /40, RR 18: SaO2 95% room air  Labs: WBC 19.4, K 3.3, Lactate 1.6, Abscess culture gram positive cocci in pairs  CT Lumbar Spine: Midline dermal thickening with small central depression/laceration in the mid sacral region. Underlying infiltration of the subcutaneous fat and subcutaneous edema extending superiorly to the L1-L2 level.   Intervention: Tylenol 650mg po x 5, Ibuprofen 600mg po x 3, Morphine 4mg IV, Percocet 5/325, Ceftriaxone 1g IV, Vancomycin 1250mg IV x 2, NaCl bolus 2.5

## 2024-06-21 NOTE — H&P ADULT - NSHPPHYSICALEXAM_GEN_ALL_CORE
VITALS:   T(C): 37.2 (06-21-24 @ 00:34), Max: 39.4 (06-20-24 @ 17:05)  HR: 93 (06-21-24 @ 00:34) (74 - 108)  BP: 100/55 (06-21-24 @ 00:34) (100/55 - 129/70)  RR: 18 (06-21-24 @ 00:34) (17 - 20)  SpO2: 97% (06-21-24 @ 00:34) (95% - 98%)    GENERAL: no acute distress, lying in bed comfortably  HEAD:  Atraumatic, normocephalic  EYES: PERRLA, conjunctiva and sclera clear  ENT: Moist mucous membranes  NECK: Supple, no JVD  HEART: Regular rate and rhythm, no murmurs, rubs, or gallops  LUNGS: Unlabored respirations.  Clear to auscultation bilaterally, no crackles, wheezing, or rhonchi  ABDOMEN: Soft, nontender, nondistended, +BS  EXTREMITIES: No clubbing, cyanosis, or edema  NERVOUS SYSTEM:  A&Ox3, no focal deficits   SKIN: sacral regiont eh VITALS:   T(C): 37.2 (06-21-24 @ 00:34), Max: 39.4 (06-20-24 @ 17:05)  HR: 93 (06-21-24 @ 00:34) (74 - 108)  BP: 100/55 (06-21-24 @ 00:34) (100/55 - 129/70)  RR: 18 (06-21-24 @ 00:34) (17 - 20)  SpO2: 97% (06-21-24 @ 00:34) (95% - 98%)    GENERAL: no acute distress, lying in bed comfortably  HEAD:  Atraumatic, normocephalic  EYES: PERRLA, conjunctiva and sclera clear  ENT: Moist mucous membranes  NECK: Supple, no JVD  HEART: Regular rate and rhythm, no murmurs, rubs, or gallops  LUNGS: Unlabored respirations.  Clear to auscultation bilaterally, no crackles, wheezing, or rhonchi  ABDOMEN: Soft, nontender, nondistended, +BS  EXTREMITIES: No clubbing, cyanosis, or edema  NERVOUS SYSTEM:  A&Ox3, no focal deficits   SKIN: 3cm x 5cm area of ulceration with purulence and surrounding redness. diffuse maculopapular lesions without drainage

## 2024-06-21 NOTE — PROGRESS NOTE ADULT - SUBJECTIVE AND OBJECTIVE BOX
OVERNIGHT EVENTS: started ancef     SUBJECTIVE / INTERVAL HPI: Patient seen and examined at bedside.     VITAL SIGNS:  Vital Signs Last 24 Hrs  T(C): 36.8 (2024 05:53), Max: 39.4 (2024 17:05)  T(F): 98.2 (2024 05:53), Max: 103 (2024 17:05)  HR: 77 (2024 05:53) (74 - 108)  BP: 115/70 (2024 05:53) (100/55 - 129/70)  BP(mean): --  RR: 18 (2024 05:53) (17 - 20)  SpO2: 95% (2024 05:53) (95% - 98%)    Parameters below as of 2024 05:53  Patient On (Oxygen Delivery Method): room air      I&O's Summary      PHYSICAL EXAM:    GENERAL: no acute distress, lying in bed comfortably  HEAD:  Atraumatic, normocephalic  EYES: PERRLA, conjunctiva and sclera clear  ENT: Moist mucous membranes  NECK: Supple, no JVD  HEART: Regular rate and rhythm, no murmurs, rubs, or gallops  LUNGS: Unlabored respirations.  Clear to auscultation bilaterally, no crackles, wheezing, or rhonchi  ABDOMEN: Soft, nontender, nondistended, +BS  EXTREMITIES: No clubbing, cyanosis, or edema  NERVOUS SYSTEM:  A&Ox3, no focal deficits   SKIN: 3cm x 5cm area of ulceration with purulence and surrounding redness. diffuse maculopapular lesions without drainage    MEDICATIONS:  MEDICATIONS  (STANDING):  enoxaparin Injectable 40 milliGRAM(s) SubCutaneous every 24 hours  pantoprazole    Tablet 40 milliGRAM(s) Oral before breakfast  vancomycin  IVPB 1250 milliGRAM(s) IV Intermittent once    MEDICATIONS  (PRN):  acetaminophen     Tablet .. 650 milliGRAM(s) Oral every 6 hours PRN Mild Pain (1 - 3)  ibuprofen  Tablet. 400 milliGRAM(s) Oral every 6 hours PRN Moderate Pain (4 - 6)  oxyCODONE    IR 2.5 milliGRAM(s) Oral every 4 hours PRN Severe Pain (7 - 10)      ALLERGIES:  Allergies    No Known Allergies    Intolerances        LABS:                        14.3   19.40 )-----------( 310      ( 2024 08:53 )             41.1     06-20    135  |  101  |  14  ----------------------------<  153<H>  3.3<L>   |  25  |  1.15    Ca    8.8      2024 08:53    TPro  7.3  /  Alb  3.1<L>  /  TBili  0.3  /  DBili  x   /  AST  17  /  ALT  17  /  AlkPhos  85  06-20    PT/INR - ( 2024 08:53 )   PT: 15.4 sec;   INR: 1.41          PTT - ( 2024 08:53 )  PTT:30.9 sec  Urinalysis Basic - ( 2024 08:53 )    Color: Yellow / Appearance: Clear / S.022 / pH: x  Gluc: 153 mg/dL / Ketone: Trace mg/dL  / Bili: Negative / Urobili: 1.0 mg/dL   Blood: x / Protein: Negative mg/dL / Nitrite: Negative   Leuk Esterase: Negative / RBC: x / WBC x   Sq Epi: x / Non Sq Epi: x / Bacteria: x      CAPILLARY BLOOD GLUCOSE          RADIOLOGY & ADDITIONAL TESTS: Reviewed.   OVERNIGHT EVENTS: started ancef     SUBJECTIVE / INTERVAL HPI: Patient seen and examined at bedside, doing well overall, states pain is improving. lesion on back does not appear to be draining, states that he does not do IV drugs.     VITAL SIGNS:  Vital Signs Last 24 Hrs  T(C): 36.8 (2024 05:53), Max: 39.4 (2024 17:05)  T(F): 98.2 (2024 05:53), Max: 103 (2024 17:05)  HR: 77 (2024 05:53) (74 - 108)  BP: 115/70 (2024 05:53) (100/55 - 129/70)  BP(mean): --  RR: 18 (2024 05:53) (17 - 20)  SpO2: 95% (2024 05:53) (95% - 98%)    Parameters below as of 2024 05:53  Patient On (Oxygen Delivery Method): room air      I&O's Summary      PHYSICAL EXAM:    GENERAL: no acute distress, lying in bed comfortably  HEAD:  Atraumatic, normocephalic  EYES: PERRLA, conjunctiva and sclera clear  ENT: Moist mucous membranes  NECK: Supple, no JVD  HEART: Regular rate and rhythm, no murmurs, rubs, or gallops  LUNGS: Unlabored respirations.  Clear to auscultation bilaterally, no crackles, wheezing, or rhonchi  ABDOMEN: Soft, nontender, nondistended, +BS  EXTREMITIES: No clubbing, cyanosis, or edema  NERVOUS SYSTEM:  A&Ox3, no focal deficits   SKIN: 3cm x 5cm area of ulceration with purulence and surrounding redness. diffuse maculopapular lesions without drainage    MEDICATIONS:  MEDICATIONS  (STANDING):  enoxaparin Injectable 40 milliGRAM(s) SubCutaneous every 24 hours  pantoprazole    Tablet 40 milliGRAM(s) Oral before breakfast  vancomycin  IVPB 1250 milliGRAM(s) IV Intermittent once    MEDICATIONS  (PRN):  acetaminophen     Tablet .. 650 milliGRAM(s) Oral every 6 hours PRN Mild Pain (1 - 3)  ibuprofen  Tablet. 400 milliGRAM(s) Oral every 6 hours PRN Moderate Pain (4 - 6)  oxyCODONE    IR 2.5 milliGRAM(s) Oral every 4 hours PRN Severe Pain (7 - 10)      ALLERGIES:  Allergies    No Known Allergies    Intolerances        LABS:                        14.3   19.40 )-----------( 310      ( 2024 08:53 )             41.1     06-20    135  |  101  |  14  ----------------------------<  153<H>  3.3<L>   |  25  |  1.15    Ca    8.8      2024 08:53    TPro  7.3  /  Alb  3.1<L>  /  TBili  0.3  /  DBili  x   /  AST  17  /  ALT  17  /  AlkPhos  85  06-20    PT/INR - ( 2024 08:53 )   PT: 15.4 sec;   INR: 1.41          PTT - ( 2024 08:53 )  PTT:30.9 sec  Urinalysis Basic - ( 2024 08:53 )    Color: Yellow / Appearance: Clear / S.022 / pH: x  Gluc: 153 mg/dL / Ketone: Trace mg/dL  / Bili: Negative / Urobili: 1.0 mg/dL   Blood: x / Protein: Negative mg/dL / Nitrite: Negative   Leuk Esterase: Negative / RBC: x / WBC x   Sq Epi: x / Non Sq Epi: x / Bacteria: x      CAPILLARY BLOOD GLUCOSE          RADIOLOGY & ADDITIONAL TESTS: Reviewed.

## 2024-06-21 NOTE — PROVIDER CONTACT NOTE (CRITICAL VALUE NOTIFICATION) - TEST AND RESULT REPORTED:
Blood culture - aerobic bottle - Gram+ cocci in clusters  abscess culture - no polymorphonuclear cells seen, numerous Gram+ cocci in pairs

## 2024-06-22 LAB
-  CLINDAMYCIN: SIGNIFICANT CHANGE UP
-  CLINDAMYCIN: SIGNIFICANT CHANGE UP
-  ERYTHROMYCIN: SIGNIFICANT CHANGE UP
-  ERYTHROMYCIN: SIGNIFICANT CHANGE UP
-  GENTAMICIN: SIGNIFICANT CHANGE UP
-  GENTAMICIN: SIGNIFICANT CHANGE UP
-  LINEZOLID: SIGNIFICANT CHANGE UP
-  LINEZOLID: SIGNIFICANT CHANGE UP
-  OXACILLIN: SIGNIFICANT CHANGE UP
-  OXACILLIN: SIGNIFICANT CHANGE UP
-  PENICILLIN: SIGNIFICANT CHANGE UP
-  PENICILLIN: SIGNIFICANT CHANGE UP
-  RIFAMPIN: SIGNIFICANT CHANGE UP
-  RIFAMPIN: SIGNIFICANT CHANGE UP
-  TETRACYCLINE: SIGNIFICANT CHANGE UP
-  TETRACYCLINE: SIGNIFICANT CHANGE UP
-  TRIMETHOPRIM/SULFAMETHOXAZOLE: SIGNIFICANT CHANGE UP
-  TRIMETHOPRIM/SULFAMETHOXAZOLE: SIGNIFICANT CHANGE UP
-  VANCOMYCIN: SIGNIFICANT CHANGE UP
-  VANCOMYCIN: SIGNIFICANT CHANGE UP
ANION GAP SERPL CALC-SCNC: 11 MMOL/L — SIGNIFICANT CHANGE UP (ref 5–17)
BASOPHILS # BLD AUTO: 0.03 K/UL — SIGNIFICANT CHANGE UP (ref 0–0.2)
BASOPHILS NFR BLD AUTO: 0.4 % — SIGNIFICANT CHANGE UP (ref 0–2)
BUN SERPL-MCNC: 7 MG/DL — SIGNIFICANT CHANGE UP (ref 7–23)
CALCIUM SERPL-MCNC: 8.8 MG/DL — SIGNIFICANT CHANGE UP (ref 8.4–10.5)
CHLORIDE SERPL-SCNC: 100 MMOL/L — SIGNIFICANT CHANGE UP (ref 96–108)
CO2 SERPL-SCNC: 22 MMOL/L — SIGNIFICANT CHANGE UP (ref 22–31)
CREAT SERPL-MCNC: 0.91 MG/DL — SIGNIFICANT CHANGE UP (ref 0.5–1.3)
CULTURE RESULTS: ABNORMAL
EGFR: 120 ML/MIN/1.73M2 — SIGNIFICANT CHANGE UP
EOSINOPHIL # BLD AUTO: 0.1 K/UL — SIGNIFICANT CHANGE UP (ref 0–0.5)
EOSINOPHIL NFR BLD AUTO: 1.5 % — SIGNIFICANT CHANGE UP (ref 0–6)
GLUCOSE SERPL-MCNC: 133 MG/DL — HIGH (ref 70–99)
HCT VFR BLD CALC: 43.5 % — SIGNIFICANT CHANGE UP (ref 39–50)
HGB BLD-MCNC: 14.5 G/DL — SIGNIFICANT CHANGE UP (ref 13–17)
IMM GRANULOCYTES NFR BLD AUTO: 0.4 % — SIGNIFICANT CHANGE UP (ref 0–0.9)
LYMPHOCYTES # BLD AUTO: 1.44 K/UL — SIGNIFICANT CHANGE UP (ref 1–3.3)
LYMPHOCYTES # BLD AUTO: 21.3 % — SIGNIFICANT CHANGE UP (ref 13–44)
MAGNESIUM SERPL-MCNC: 1.9 MG/DL — SIGNIFICANT CHANGE UP (ref 1.6–2.6)
MCHC RBC-ENTMCNC: 30.9 PG — SIGNIFICANT CHANGE UP (ref 27–34)
MCHC RBC-ENTMCNC: 33.3 GM/DL — SIGNIFICANT CHANGE UP (ref 32–36)
MCV RBC AUTO: 92.6 FL — SIGNIFICANT CHANGE UP (ref 80–100)
METHOD TYPE: SIGNIFICANT CHANGE UP
METHOD TYPE: SIGNIFICANT CHANGE UP
MONOCYTES # BLD AUTO: 0.49 K/UL — SIGNIFICANT CHANGE UP (ref 0–0.9)
MONOCYTES NFR BLD AUTO: 7.2 % — SIGNIFICANT CHANGE UP (ref 2–14)
NEUTROPHILS # BLD AUTO: 4.67 K/UL — SIGNIFICANT CHANGE UP (ref 1.8–7.4)
NEUTROPHILS NFR BLD AUTO: 69.2 % — SIGNIFICANT CHANGE UP (ref 43–77)
NRBC # BLD: 0 /100 WBCS — SIGNIFICANT CHANGE UP (ref 0–0)
ORGANISM # SPEC MICROSCOPIC CNT: ABNORMAL
ORGANISM # SPEC MICROSCOPIC CNT: ABNORMAL
ORGANISM # SPEC MICROSCOPIC CNT: SIGNIFICANT CHANGE UP
PHOSPHATE SERPL-MCNC: 2.4 MG/DL — LOW (ref 2.5–4.5)
PLATELET # BLD AUTO: 250 K/UL — SIGNIFICANT CHANGE UP (ref 150–400)
POTASSIUM SERPL-MCNC: 3.8 MMOL/L — SIGNIFICANT CHANGE UP (ref 3.5–5.3)
POTASSIUM SERPL-SCNC: 3.8 MMOL/L — SIGNIFICANT CHANGE UP (ref 3.5–5.3)
RBC # BLD: 4.7 M/UL — SIGNIFICANT CHANGE UP (ref 4.2–5.8)
RBC # FLD: 12.9 % — SIGNIFICANT CHANGE UP (ref 10.3–14.5)
SODIUM SERPL-SCNC: 133 MMOL/L — LOW (ref 135–145)
SPECIMEN SOURCE: SIGNIFICANT CHANGE UP
T PALLIDUM AB TITR SER: NEGATIVE — SIGNIFICANT CHANGE UP
VANCOMYCIN TROUGH SERPL-MCNC: 12.6 UG/ML — SIGNIFICANT CHANGE UP (ref 10–20)
WBC # BLD: 6.76 K/UL — SIGNIFICANT CHANGE UP (ref 3.8–10.5)
WBC # FLD AUTO: 6.76 K/UL — SIGNIFICANT CHANGE UP (ref 3.8–10.5)

## 2024-06-22 PROCEDURE — 99232 SBSQ HOSP IP/OBS MODERATE 35: CPT

## 2024-06-22 PROCEDURE — 99232 SBSQ HOSP IP/OBS MODERATE 35: CPT | Mod: GC

## 2024-06-22 RX ORDER — VANCOMYCIN HYDROCHLORIDE 50 MG/ML
1250 KIT ORAL ONCE
Refills: 0 | Status: COMPLETED | OUTPATIENT
Start: 2024-06-22 | End: 2024-06-22

## 2024-06-22 RX ORDER — MAGNESIUM, ALUMINUM HYDROXIDE 400-400
30 TABLET,CHEWABLE ORAL EVERY 4 HOURS
Refills: 0 | Status: DISCONTINUED | OUTPATIENT
Start: 2024-06-22 | End: 2024-07-06

## 2024-06-22 RX ORDER — VANCOMYCIN HYDROCHLORIDE 50 MG/ML
1500 KIT ORAL EVERY 8 HOURS
Refills: 0 | Status: COMPLETED | OUTPATIENT
Start: 2024-06-22 | End: 2024-06-23

## 2024-06-22 RX ORDER — SOD PHOS DI, MONO/K PHOS MONO 250 MG
1 TABLET ORAL ONCE
Refills: 0 | Status: COMPLETED | OUTPATIENT
Start: 2024-06-22 | End: 2024-06-22

## 2024-06-22 RX ORDER — DIPHENHYDRAMINE HCL 12.5MG/5ML
25 ELIXIR ORAL ONCE
Refills: 0 | Status: COMPLETED | OUTPATIENT
Start: 2024-06-22 | End: 2024-06-22

## 2024-06-22 RX ADMIN — VANCOMYCIN HYDROCHLORIDE 166.67 MILLIGRAM(S): KIT at 14:16

## 2024-06-22 RX ADMIN — VANCOMYCIN HYDROCHLORIDE 166.67 MILLIGRAM(S): KIT at 06:33

## 2024-06-22 RX ADMIN — Medication 400 MILLIGRAM(S): at 12:06

## 2024-06-22 RX ADMIN — Medication 30 MILLILITER(S): at 10:46

## 2024-06-22 RX ADMIN — OXYCODONE HYDROCHLORIDE 2.5 MILLIGRAM(S): 100 SOLUTION ORAL at 17:38

## 2024-06-22 RX ADMIN — OXYCODONE HYDROCHLORIDE 2.5 MILLIGRAM(S): 100 SOLUTION ORAL at 07:30

## 2024-06-22 RX ADMIN — ENOXAPARIN SODIUM 40 MILLIGRAM(S): 100 INJECTION SUBCUTANEOUS at 06:34

## 2024-06-22 RX ADMIN — Medication 400 MILLIGRAM(S): at 13:06

## 2024-06-22 RX ADMIN — OXYCODONE HYDROCHLORIDE 2.5 MILLIGRAM(S): 100 SOLUTION ORAL at 16:38

## 2024-06-22 RX ADMIN — PANTOPRAZOLE SODIUM 40 MILLIGRAM(S): 40 INJECTION, POWDER, FOR SOLUTION INTRAVENOUS at 06:33

## 2024-06-22 RX ADMIN — Medication 25 MILLIGRAM(S): at 18:48

## 2024-06-22 RX ADMIN — VANCOMYCIN HYDROCHLORIDE 300 MILLIGRAM(S): KIT at 22:25

## 2024-06-22 RX ADMIN — Medication 1 PACKET(S): at 08:55

## 2024-06-22 RX ADMIN — Medication 400 MILLIGRAM(S): at 23:02

## 2024-06-22 RX ADMIN — OXYCODONE HYDROCHLORIDE 2.5 MILLIGRAM(S): 100 SOLUTION ORAL at 06:32

## 2024-06-22 NOTE — PROGRESS NOTE ADULT - SUBJECTIVE AND OBJECTIVE BOX
INTERVAL HPI/OVERNIGHT EVENTS:    Patient was seen and examined at bedside.  Complains of headache.  No stiff neck or photophobia     CONSTITUTIONAL:  Negative fever or chills, feels well, good appetite  EYES:  Negative  blurry vision or double vision  CARDIOVASCULAR:  Negative for chest pain or palpitations  RESPIRATORY:  Negative for cough, wheezing, or SOB   GASTROINTESTINAL:  Negative for nausea, vomiting, diarrhea, constipation, or abdominal pain  GENITOURINARY:  Negative frequency, urgency or dysuria  NEUROLOGIC:  No headache, confusion, dizziness, lightheadedness      ANTIBIOTICS/RELEVANT:    MEDICATIONS  (STANDING):  enoxaparin Injectable 40 milliGRAM(s) SubCutaneous every 24 hours  pantoprazole    Tablet 40 milliGRAM(s) Oral before breakfast  vancomycin  IVPB 1250 milliGRAM(s) IV Intermittent once    MEDICATIONS  (PRN):  acetaminophen     Tablet .. 650 milliGRAM(s) Oral every 6 hours PRN Mild Pain (1 - 3)  aluminum hydroxide/magnesium hydroxide/simethicone Suspension 30 milliLiter(s) Oral every 4 hours PRN Dyspepsia  ibuprofen  Tablet. 400 milliGRAM(s) Oral every 6 hours PRN Moderate Pain (4 - 6)  oxyCODONE    IR 2.5 milliGRAM(s) Oral every 4 hours PRN Severe Pain (7 - 10)        Vital Signs Last 24 Hrs  T(C): 36.7 (22 Jun 2024 09:00), Max: 37.3 (22 Jun 2024 06:44)  T(F): 98 (22 Jun 2024 09:00), Max: 99.2 (22 Jun 2024 06:44)  HR: 80 (22 Jun 2024 09:00) (70 - 96)  BP: 107/68 (22 Jun 2024 09:00) (104/60 - 119/76)  BP(mean): --  RR: 18 (22 Jun 2024 09:00) (18 - 18)  SpO2: 96% (22 Jun 2024 09:00) (96% - 96%)    Parameters below as of 22 Jun 2024 09:00  Patient On (Oxygen Delivery Method): room air        PHYSICAL EXAM:  Constitutional: non-toxic, no distress  Eyes:MARY KATE, EOMI  Ear/Nose/Throat: no oral lesion, no sinus tenderness on percussion	  Neck:  supple  Respiratory: CTA ivania  Cardiovascular: S1S2 RRR, no murmurs  Gastrointestinal:soft, (+) BS, no HSM  Extremities:  no edema  skin:  large ulceration at cleft of buttock with purulent drainage and surrounding erythema  Vascular: DP Pulse:	right normal; left normal      LABS:                        14.5   6.76  )-----------( 250      ( 22 Jun 2024 07:05 )             43.5     06-22    133<L>  |  100  |  7   ----------------------------<  133<H>  3.8   |  22  |  0.91    Ca    8.8      22 Jun 2024 07:05  Phos  2.4     06-22  Mg     1.9     06-22    TPro  6.4  /  Alb  3.3  /  TBili  0.2  /  DBili  x   /  AST  32  /  ALT  25  /  AlkPhos  77  06-21      Urinalysis Basic - ( 22 Jun 2024 07:05 )    Color: x / Appearance: x / SG: x / pH: x  Gluc: 133 mg/dL / Ketone: x  / Bili: x / Urobili: x   Blood: x / Protein: x / Nitrite: x   Leuk Esterase: x / RBC: x / WBC x   Sq Epi: x / Non Sq Epi: x / Bacteria: x        MICROBIOLOGY:    Culture - Abscess with Gram Stain (06.20.24 @ 10:07)    Gram Stain:   No polymorphonuclear cells seen per low power field  Numerous Gram positive cocci in pairs per oil power field   Specimen Source: .Abscess back cyst   Culture Results:   Numerous Staphylococcus aureus  Numerous Streptococcus dysgalactiae (Group C/G) "Susceptibilities not  performed"        RADIOLOGY & ADDITIONAL STUDIES:

## 2024-06-22 NOTE — PROGRESS NOTE ADULT - PROBLEM SELECTOR PLAN 3
Pt. reports smoking crack cocaine and meth; he denies IVDA  -- monitor for withdrawal  -- SBIRT social work eval  -- utox + bzd and cocaine.

## 2024-06-22 NOTE — PROGRESS NOTE ADULT - ASSESSMENT
IMPRESSION:  MRSA bacteremia secondary to pilonidal abscess.  Stable from ID standpoint.  Abscess culture also with group C/G strep.  Unclear significance but vancomycin will cover this as well    Recommend:  1.  Continue Vancomycin 1250 mg IV q8hrs.  Check trough around 9-10 pm tonight (goal 13-18)  2.  Follow up susceptibilities  3.  Follow up surveillance blood cultures    ID team 1 will follow

## 2024-06-22 NOTE — PROGRESS NOTE ADULT - SUBJECTIVE AND OBJECTIVE BOX
Patient is a 23y old  Male who presents with a chief complaint of Abscess (21 Jun 2024 15:02)      INTERVAL HPI: Pt seen and examined at bedside. in NAD, eating breakfast. Endorses lower back pain and head ache. No neck pain/stiffness, no vission changes, abd pain, fevers/chilles.       ICU Vital Signs Last 24 Hrs  T(C): 36.7 (22 Jun 2024 09:00), Max: 37.3 (22 Jun 2024 06:44)  T(F): 98 (22 Jun 2024 09:00), Max: 99.2 (22 Jun 2024 06:44)  HR: 80 (22 Jun 2024 09:00) (70 - 96)  BP: 107/68 (22 Jun 2024 09:00) (104/60 - 119/76)  BP(mean): --  ABP: --  ABP(mean): --  RR: 18 (22 Jun 2024 09:00) (18 - 18)  SpO2: 96% (22 Jun 2024 09:00) (96% - 96%)    O2 Parameters below as of 22 Jun 2024 09:00  Patient On (Oxygen Delivery Method): room air          I&O's Summary        LABS:                        14.5   6.76  )-----------( 250      ( 22 Jun 2024 07:05 )             43.5     06-22    133<L>  |  100  |  7   ----------------------------<  133<H>  3.8   |  22  |  0.91    Ca    8.8      22 Jun 2024 07:05  Phos  2.4     06-22  Mg     1.9     06-22    TPro  6.4  /  Alb  3.3  /  TBili  0.2  /  DBili  x   /  AST  32  /  ALT  25  /  AlkPhos  77  06-21      Urinalysis Basic - ( 22 Jun 2024 07:05 )    Color: x / Appearance: x / SG: x / pH: x  Gluc: 133 mg/dL / Ketone: x  / Bili: x / Urobili: x   Blood: x / Protein: x / Nitrite: x   Leuk Esterase: x / RBC: x / WBC x   Sq Epi: x / Non Sq Epi: x / Bacteria: x      CAPILLARY BLOOD GLUCOSE            RADIOLOGY & ADDITIONAL TESTS:    Consultant(s) Notes Reviewed:  [x ] YES  [ ] NO    MEDICATIONS  (STANDING):  enoxaparin Injectable 40 milliGRAM(s) SubCutaneous every 24 hours  pantoprazole    Tablet 40 milliGRAM(s) Oral before breakfast  vancomycin  IVPB 1250 milliGRAM(s) IV Intermittent once    MEDICATIONS  (PRN):  acetaminophen     Tablet .. 650 milliGRAM(s) Oral every 6 hours PRN Mild Pain (1 - 3)  aluminum hydroxide/magnesium hydroxide/simethicone Suspension 30 milliLiter(s) Oral every 4 hours PRN Dyspepsia  ibuprofen  Tablet. 400 milliGRAM(s) Oral every 6 hours PRN Moderate Pain (4 - 6)  oxyCODONE    IR 2.5 milliGRAM(s) Oral every 4 hours PRN Severe Pain (7 - 10)      PHYSICAL EXAM:  GENERAL: no acute distress, lying in bed comfortably  HEAD:  Atraumatic, normocephalic  EYES: PERRLA, conjunctiva and sclera clear  ENT: Moist mucous membranes  NECK: Supple, no JVD  HEART: Regular rate and rhythm, no murmurs, rubs, or gallops  LUNGS: Unlabored respirations.  Clear to auscultation bilaterally, no crackles, wheezing, or rhonchi  ABDOMEN: Soft, nontender, nondistended, +BS  EXTREMITIES: No clubbing, cyanosis, or edema  NERVOUS SYSTEM:  A&Ox3, no focal deficits   SKIN: 3cm x 5cm area of ulceration with purulence and surrounding redness.     Care Discussed with Consultants/Other Providers [ x] YES  [ ] NO

## 2024-06-22 NOTE — PROGRESS NOTE ADULT - PROBLEM SELECTOR PLAN 1
sepsis POA (WBC, HR); BCx 6/20 growing MRSA in 2/2 bottles; source likely skin (wound above gluteal cleft; folliculitis of extremities); no e/o abscess on CT L-spine; HIV negative; WBC normalized on vanc  -- cont. vanc, dose per level  -- supportive care  -- f/u BCx sensitivities, and wound cx (back wound)  - f/up surveillance cx 6/21   -- check TTE, evaluate for vegetation  -- f/u ID recs

## 2024-06-23 LAB
ANION GAP SERPL CALC-SCNC: 10 MMOL/L — SIGNIFICANT CHANGE UP (ref 5–17)
BASOPHILS # BLD AUTO: 0.04 K/UL — SIGNIFICANT CHANGE UP (ref 0–0.2)
BASOPHILS NFR BLD AUTO: 0.6 % — SIGNIFICANT CHANGE UP (ref 0–2)
BUN SERPL-MCNC: 11 MG/DL — SIGNIFICANT CHANGE UP (ref 7–23)
CALCIUM SERPL-MCNC: 9.1 MG/DL — SIGNIFICANT CHANGE UP (ref 8.4–10.5)
CHLORIDE SERPL-SCNC: 102 MMOL/L — SIGNIFICANT CHANGE UP (ref 96–108)
CO2 SERPL-SCNC: 25 MMOL/L — SIGNIFICANT CHANGE UP (ref 22–31)
CREAT SERPL-MCNC: 0.84 MG/DL — SIGNIFICANT CHANGE UP (ref 0.5–1.3)
CULTURE RESULTS: ABNORMAL
EGFR: 124 ML/MIN/1.73M2 — SIGNIFICANT CHANGE UP
EOSINOPHIL # BLD AUTO: 0.25 K/UL — SIGNIFICANT CHANGE UP (ref 0–0.5)
EOSINOPHIL NFR BLD AUTO: 3.5 % — SIGNIFICANT CHANGE UP (ref 0–6)
GLUCOSE SERPL-MCNC: 115 MG/DL — HIGH (ref 70–99)
HCT VFR BLD CALC: 42.9 % — SIGNIFICANT CHANGE UP (ref 39–50)
HGB BLD-MCNC: 14.6 G/DL — SIGNIFICANT CHANGE UP (ref 13–17)
IMM GRANULOCYTES NFR BLD AUTO: 0.7 % — SIGNIFICANT CHANGE UP (ref 0–0.9)
LYMPHOCYTES # BLD AUTO: 1.83 K/UL — SIGNIFICANT CHANGE UP (ref 1–3.3)
LYMPHOCYTES # BLD AUTO: 25.7 % — SIGNIFICANT CHANGE UP (ref 13–44)
MAGNESIUM SERPL-MCNC: 2.1 MG/DL — SIGNIFICANT CHANGE UP (ref 1.6–2.6)
MCHC RBC-ENTMCNC: 31.5 PG — SIGNIFICANT CHANGE UP (ref 27–34)
MCHC RBC-ENTMCNC: 34 GM/DL — SIGNIFICANT CHANGE UP (ref 32–36)
MCV RBC AUTO: 92.7 FL — SIGNIFICANT CHANGE UP (ref 80–100)
MONOCYTES # BLD AUTO: 0.9 K/UL — SIGNIFICANT CHANGE UP (ref 0–0.9)
MONOCYTES NFR BLD AUTO: 12.6 % — SIGNIFICANT CHANGE UP (ref 2–14)
NEUTROPHILS # BLD AUTO: 4.06 K/UL — SIGNIFICANT CHANGE UP (ref 1.8–7.4)
NEUTROPHILS NFR BLD AUTO: 56.9 % — SIGNIFICANT CHANGE UP (ref 43–77)
NRBC # BLD: 0 /100 WBCS — SIGNIFICANT CHANGE UP (ref 0–0)
PHOSPHATE SERPL-MCNC: 4 MG/DL — SIGNIFICANT CHANGE UP (ref 2.5–4.5)
PLATELET # BLD AUTO: 258 K/UL — SIGNIFICANT CHANGE UP (ref 150–400)
POTASSIUM SERPL-MCNC: 3.8 MMOL/L — SIGNIFICANT CHANGE UP (ref 3.5–5.3)
POTASSIUM SERPL-SCNC: 3.8 MMOL/L — SIGNIFICANT CHANGE UP (ref 3.5–5.3)
RBC # BLD: 4.63 M/UL — SIGNIFICANT CHANGE UP (ref 4.2–5.8)
RBC # FLD: 13.1 % — SIGNIFICANT CHANGE UP (ref 10.3–14.5)
SODIUM SERPL-SCNC: 137 MMOL/L — SIGNIFICANT CHANGE UP (ref 135–145)
SPECIMEN SOURCE: SIGNIFICANT CHANGE UP
VANCOMYCIN TROUGH SERPL-MCNC: 13.2 UG/ML — SIGNIFICANT CHANGE UP (ref 10–20)
WBC # BLD: 7.13 K/UL — SIGNIFICANT CHANGE UP (ref 3.8–10.5)
WBC # FLD AUTO: 7.13 K/UL — SIGNIFICANT CHANGE UP (ref 3.8–10.5)

## 2024-06-23 PROCEDURE — 99232 SBSQ HOSP IP/OBS MODERATE 35: CPT | Mod: GC

## 2024-06-23 PROCEDURE — 99232 SBSQ HOSP IP/OBS MODERATE 35: CPT

## 2024-06-23 RX ORDER — VANCOMYCIN HYDROCHLORIDE 50 MG/ML
1500 KIT ORAL EVERY 8 HOURS
Refills: 0 | Status: COMPLETED | OUTPATIENT
Start: 2024-06-23 | End: 2024-06-24

## 2024-06-23 RX ADMIN — VANCOMYCIN HYDROCHLORIDE 300 MILLIGRAM(S): KIT at 14:48

## 2024-06-23 RX ADMIN — PANTOPRAZOLE SODIUM 40 MILLIGRAM(S): 40 INJECTION, POWDER, FOR SOLUTION INTRAVENOUS at 06:35

## 2024-06-23 RX ADMIN — OXYCODONE HYDROCHLORIDE 2.5 MILLIGRAM(S): 100 SOLUTION ORAL at 09:37

## 2024-06-23 RX ADMIN — Medication 400 MILLIGRAM(S): at 00:02

## 2024-06-23 RX ADMIN — VANCOMYCIN HYDROCHLORIDE 300 MILLIGRAM(S): KIT at 06:33

## 2024-06-23 RX ADMIN — Medication 650 MILLIGRAM(S): at 01:42

## 2024-06-23 RX ADMIN — Medication 650 MILLIGRAM(S): at 02:42

## 2024-06-23 RX ADMIN — OXYCODONE HYDROCHLORIDE 2.5 MILLIGRAM(S): 100 SOLUTION ORAL at 10:37

## 2024-06-23 RX ADMIN — ENOXAPARIN SODIUM 40 MILLIGRAM(S): 100 INJECTION SUBCUTANEOUS at 06:35

## 2024-06-23 RX ADMIN — Medication 650 MILLIGRAM(S): at 23:26

## 2024-06-23 NOTE — PROGRESS NOTE ADULT - SUBJECTIVE AND OBJECTIVE BOX
INTERVAL HPI/OVERNIGHT EVENTS:    Patient was seen and examined at bedside.  Reports improvement in pain.  + itchiness    CONSTITUTIONAL:  Negative fever or chills, feels well, good appetite  EYES:  Negative  blurry vision or double vision  CARDIOVASCULAR:  Negative for chest pain or palpitations  RESPIRATORY:  Negative for cough, wheezing, or SOB   GASTROINTESTINAL:  Negative for nausea, vomiting, diarrhea, constipation, or abdominal pain  GENITOURINARY:  Negative frequency, urgency or dysuria  NEUROLOGIC:  No headache, confusion, dizziness, lightheadedness      ANTIBIOTICS/RELEVANT:    MEDICATIONS  (STANDING):  enoxaparin Injectable 40 milliGRAM(s) SubCutaneous every 24 hours  pantoprazole    Tablet 40 milliGRAM(s) Oral before breakfast  vancomycin  IVPB 1500 milliGRAM(s) IV Intermittent every 8 hours    MEDICATIONS  (PRN):  acetaminophen     Tablet .. 650 milliGRAM(s) Oral every 6 hours PRN Mild Pain (1 - 3)  aluminum hydroxide/magnesium hydroxide/simethicone Suspension 30 milliLiter(s) Oral every 4 hours PRN Dyspepsia  ibuprofen  Tablet. 400 milliGRAM(s) Oral every 6 hours PRN Moderate Pain (4 - 6)  oxyCODONE    IR 2.5 milliGRAM(s) Oral every 4 hours PRN Severe Pain (7 - 10)        Vital Signs Last 24 Hrs  T(C): 36.6 (23 Jun 2024 09:30), Max: 36.8 (22 Jun 2024 21:08)  T(F): 97.8 (23 Jun 2024 09:30), Max: 98.2 (22 Jun 2024 21:08)  HR: 63 (23 Jun 2024 09:30) (61 - 70)  BP: 120/88 (23 Jun 2024 09:30) (118/60 - 133/66)  BP(mean): --  RR: 18 (23 Jun 2024 09:30) (18 - 18)  SpO2: 97% (23 Jun 2024 09:30) (96% - 99%)    Parameters below as of 23 Jun 2024 09:30  Patient On (Oxygen Delivery Method): room air        PHYSICAL EXAM:  Constitutional: non-toxic, no distress  Eyes:MARY KATE, EOMI  Ear/Nose/Throat: no oral lesion, no sinus tenderness on percussion	  Neck:  supple  Respiratory: CTA ivania  Cardiovascular: S1S2 RRR, no murmurs  Gastrointestinal:soft, (+) BS, no HSM  Extremities:no e/e/c  skin:  lower back with ulceration, + drainage   Vascular: DP Pulse:	right normal; left normal      LABS:                        14.5   6.76  )-----------( 250      ( 22 Jun 2024 07:05 )             43.5     06-22    133<L>  |  100  |  7   ----------------------------<  133<H>  3.8   |  22  |  0.91    Ca    8.8      22 Jun 2024 07:05  Phos  2.4     06-22  Mg     1.9     06-22        Urinalysis Basic - ( 22 Jun 2024 07:05 )    Color: x / Appearance: x / SG: x / pH: x  Gluc: 133 mg/dL / Ketone: x  / Bili: x / Urobili: x   Blood: x / Protein: x / Nitrite: x   Leuk Esterase: x / RBC: x / WBC x   Sq Epi: x / Non Sq Epi: x / Bacteria: x        MICROBIOLOGY:    Culture - Blood (06.21.24 @ 12:35)    Specimen Source: .Blood Blood   Culture Results:   No growth at 24 hours        Culture - Abscess with Gram Stain (06.20.24 @ 10:07)    -  Trimethoprim/Sulfamethoxazole: S <=0.5/9.5   -  Vancomycin: S 2   -  Oxacillin: R 0.5   -  Penicillin: R >8   -  Rifampin: S <=1 Should not be used as monotherapy   -  Tetracycline: I 8   Gram Stain:   No polymorphonuclear cells seen per low power field  Numerous Gram positive cocci in pairs per oil power field   -  Clindamycin: S 0.5   -  Erythromycin: R >4   -  Gentamicin: S <=1 Should not be used as monotherapy   -  Linezolid: S 2   Specimen Source: .Abscess back cyst   Culture Results:   Numerous Methicillin Resistant Staphylococcus aureus  Numerous Streptococcus dysgalactiae (Group C/G) "Susceptibilities not  performed"   Organism Identification: Methicillin resistant Staphylococcus aureus   Organism: Methicillin resistant Staphylococcus aureus   Method Type: AISHA        RADIOLOGY & ADDITIONAL STUDIES:

## 2024-06-23 NOTE — PROGRESS NOTE ADULT - SUBJECTIVE AND OBJECTIVE BOX
INTERVAL HPI: Pt seen and examined at bedside. in NAD. Endorses lower back pain. No neck pain/stiffness, no vission changes, abd pain, fevers/chilles.       ICU Vital Signs Last 24 Hrs  T(C): 36.6 (23 Jun 2024 09:30), Max: 36.8 (22 Jun 2024 21:08)  T(F): 97.8 (23 Jun 2024 09:30), Max: 98.2 (22 Jun 2024 21:08)  HR: 63 (23 Jun 2024 09:30) (61 - 70)  BP: 120/88 (23 Jun 2024 09:30) (118/60 - 133/66)  BP(mean): --  ABP: --  ABP(mean): --  RR: 18 (23 Jun 2024 09:30) (18 - 18)  SpO2: 97% (23 Jun 2024 09:30) (96% - 99%)    O2 Parameters below as of 23 Jun 2024 09:30  Patient On (Oxygen Delivery Method): room air          I&O's Summary        LABS:                        14.5   6.76  )-----------( 250      ( 22 Jun 2024 07:05 )             43.5     06-22    133<L>  |  100  |  7   ----------------------------<  133<H>  3.8   |  22  |  0.91    Ca    8.8      22 Jun 2024 07:05  Phos  2.4     06-22  Mg     1.9     06-22        Urinalysis Basic - ( 22 Jun 2024 07:05 )    Color: x / Appearance: x / SG: x / pH: x  Gluc: 133 mg/dL / Ketone: x  / Bili: x / Urobili: x   Blood: x / Protein: x / Nitrite: x   Leuk Esterase: x / RBC: x / WBC x   Sq Epi: x / Non Sq Epi: x / Bacteria: x      CAPILLARY BLOOD GLUCOSE            RADIOLOGY & ADDITIONAL TESTS:    Consultant(s) Notes Reviewed:  [x ] YES  [ ] NO    MEDICATIONS  (STANDING):  enoxaparin Injectable 40 milliGRAM(s) SubCutaneous every 24 hours  pantoprazole    Tablet 40 milliGRAM(s) Oral before breakfast  vancomycin  IVPB 1500 milliGRAM(s) IV Intermittent every 8 hours    MEDICATIONS  (PRN):  acetaminophen     Tablet .. 650 milliGRAM(s) Oral every 6 hours PRN Mild Pain (1 - 3)  aluminum hydroxide/magnesium hydroxide/simethicone Suspension 30 milliLiter(s) Oral every 4 hours PRN Dyspepsia  ibuprofen  Tablet. 400 milliGRAM(s) Oral every 6 hours PRN Moderate Pain (4 - 6)  oxyCODONE    IR 2.5 milliGRAM(s) Oral every 4 hours PRN Severe Pain (7 - 10)    PHYSICAL EXAM:  GENERAL: no acute distress, lying in bed comfortably  HEAD:  Atraumatic, normocephalic  EYES: PERRLA, conjunctiva and sclera clear  ENT: Moist mucous membranes  NECK: Supple, no JVD  HEART: Regular rate and rhythm, no murmurs, rubs, or gallops  LUNGS: Unlabored respirations.  Clear to auscultation bilaterally, no crackles, wheezing, or rhonchi  ABDOMEN: Soft, nontender, nondistended, +BS  EXTREMITIES: No clubbing, cyanosis, or edema  NERVOUS SYSTEM:  A&Ox3, no focal deficits   SKIN: 3cm x 5cm area of ulceration with purulence and surrounding redness.     Care Discussed with Consultants/Other Providers [ x] YES  [ ] NO

## 2024-06-23 NOTE — PROGRESS NOTE ADULT - PROBLEM SELECTOR PLAN 1
sepsis POA (WBC, HR); BCx 6/20 growing MRSA in 2/2 bottles; source likely skin (wound above gluteal cleft; folliculitis of extremities); no e/o abscess on CT L-spine; HIV negative; WBC normalized on vanc  -- cont. vanc, dose per level- redosed at 1500 q8hrs  -- supportive care  -- f/u BCx sensitivities, and wound cx (back wound)  - f/up surveillance cx 6/21 NGTD  -- check TTE, evaluate for vegetation  -- f/u ID recs

## 2024-06-23 NOTE — PROGRESS NOTE ADULT - SUBJECTIVE AND OBJECTIVE BOX
OVERNIGHT EVENTS: NAEON    SUBJECTIVE / INTERVAL HPI: Patient seen and examined at bedside, no new complaints, doing well overall.     VITAL SIGNS:  Vital Signs Last 24 Hrs  T(C): 36.6 (23 Jun 2024 09:30), Max: 36.8 (22 Jun 2024 21:08)  T(F): 97.8 (23 Jun 2024 09:30), Max: 98.2 (22 Jun 2024 21:08)  HR: 63 (23 Jun 2024 09:30) (61 - 70)  BP: 120/88 (23 Jun 2024 09:30) (118/60 - 133/66)  BP(mean): --  RR: 18 (23 Jun 2024 09:30) (18 - 18)  SpO2: 97% (23 Jun 2024 09:30) (96% - 99%)    Parameters below as of 23 Jun 2024 09:30  Patient On (Oxygen Delivery Method): room air      I&O's Summary      PHYSICAL EXAM:    GENERAL: no acute distress, lying in bed comfortably  HEAD:  Atraumatic, normocephalic  EYES: PERRLA, conjunctiva and sclera clear  ENT: Moist mucous membranes  NECK: Supple, no JVD  HEART: Regular rate and rhythm, no murmurs, rubs, or gallops  LUNGS: Unlabored respirations.  Clear to auscultation bilaterally, no crackles, wheezing, or rhonchi  ABDOMEN: Soft, nontender, nondistended, +BS  EXTREMITIES: No clubbing, cyanosis, or edema  NERVOUS SYSTEM:  A&Ox3, no focal deficits   SKIN: 3cm x 5cm area of ulceration with purulence and surrounding redness.     MEDICATIONS:  MEDICATIONS  (STANDING):  enoxaparin Injectable 40 milliGRAM(s) SubCutaneous every 24 hours  pantoprazole    Tablet 40 milliGRAM(s) Oral before breakfast  vancomycin  IVPB 1500 milliGRAM(s) IV Intermittent every 8 hours    MEDICATIONS  (PRN):  acetaminophen     Tablet .. 650 milliGRAM(s) Oral every 6 hours PRN Mild Pain (1 - 3)  aluminum hydroxide/magnesium hydroxide/simethicone Suspension 30 milliLiter(s) Oral every 4 hours PRN Dyspepsia  ibuprofen  Tablet. 400 milliGRAM(s) Oral every 6 hours PRN Moderate Pain (4 - 6)  oxyCODONE    IR 2.5 milliGRAM(s) Oral every 4 hours PRN Severe Pain (7 - 10)      ALLERGIES:  Allergies    No Known Allergies    Intolerances        LABS:                        14.5   6.76  )-----------( 250      ( 22 Jun 2024 07:05 )             43.5     06-22    133<L>  |  100  |  7   ----------------------------<  133<H>  3.8   |  22  |  0.91    Ca    8.8      22 Jun 2024 07:05  Phos  2.4     06-22  Mg     1.9     06-22        Urinalysis Basic - ( 22 Jun 2024 07:05 )    Color: x / Appearance: x / SG: x / pH: x  Gluc: 133 mg/dL / Ketone: x  / Bili: x / Urobili: x   Blood: x / Protein: x / Nitrite: x   Leuk Esterase: x / RBC: x / WBC x   Sq Epi: x / Non Sq Epi: x / Bacteria: x      CAPILLARY BLOOD GLUCOSE          RADIOLOGY & ADDITIONAL TESTS: Reviewed.

## 2024-06-23 NOTE — PROGRESS NOTE ADULT - ASSESSMENT
IMPRESSION:  MRSA bacteremia secondary to pilonidal abscess.  Stable from ID standpoint.  Abscess culture also with group C/G strep.  Unclear significance but vancomycin will cover this as well    Recommend:  1.  Continue Vancomycin.  Agree with increasing dose to 1500 mg IV q8hrs.  Check trough before 4th dose (around 10 pm tonight).  Goal is 13-18  2.  Follow up susceptibilities  3.  Follow up surveillance blood cultures    ID team 1 will follow

## 2024-06-24 LAB
ANION GAP SERPL CALC-SCNC: 10 MMOL/L — SIGNIFICANT CHANGE UP (ref 5–17)
BASOPHILS # BLD AUTO: 0.04 K/UL — SIGNIFICANT CHANGE UP (ref 0–0.2)
BASOPHILS NFR BLD AUTO: 0.6 % — SIGNIFICANT CHANGE UP (ref 0–2)
BUN SERPL-MCNC: 15 MG/DL — SIGNIFICANT CHANGE UP (ref 7–23)
CALCIUM SERPL-MCNC: 9.3 MG/DL — SIGNIFICANT CHANGE UP (ref 8.4–10.5)
CHLORIDE SERPL-SCNC: 101 MMOL/L — SIGNIFICANT CHANGE UP (ref 96–108)
CO2 SERPL-SCNC: 27 MMOL/L — SIGNIFICANT CHANGE UP (ref 22–31)
CREAT SERPL-MCNC: 0.75 MG/DL — SIGNIFICANT CHANGE UP (ref 0.5–1.3)
EGFR: 128 ML/MIN/1.73M2 — SIGNIFICANT CHANGE UP
EOSINOPHIL # BLD AUTO: 0.31 K/UL — SIGNIFICANT CHANGE UP (ref 0–0.5)
EOSINOPHIL NFR BLD AUTO: 4.4 % — SIGNIFICANT CHANGE UP (ref 0–6)
GLUCOSE SERPL-MCNC: 110 MG/DL — HIGH (ref 70–99)
HCT VFR BLD CALC: 46.5 % — SIGNIFICANT CHANGE UP (ref 39–50)
HGB BLD-MCNC: 16 G/DL — SIGNIFICANT CHANGE UP (ref 13–17)
IMM GRANULOCYTES NFR BLD AUTO: 0.8 % — SIGNIFICANT CHANGE UP (ref 0–0.9)
LYMPHOCYTES # BLD AUTO: 2.5 K/UL — SIGNIFICANT CHANGE UP (ref 1–3.3)
LYMPHOCYTES # BLD AUTO: 35.4 % — SIGNIFICANT CHANGE UP (ref 13–44)
MAGNESIUM SERPL-MCNC: 2.5 MG/DL — SIGNIFICANT CHANGE UP (ref 1.6–2.6)
MCHC RBC-ENTMCNC: 31.2 PG — SIGNIFICANT CHANGE UP (ref 27–34)
MCHC RBC-ENTMCNC: 34.4 GM/DL — SIGNIFICANT CHANGE UP (ref 32–36)
MCV RBC AUTO: 90.6 FL — SIGNIFICANT CHANGE UP (ref 80–100)
MONOCYTES # BLD AUTO: 0.91 K/UL — HIGH (ref 0–0.9)
MONOCYTES NFR BLD AUTO: 12.9 % — SIGNIFICANT CHANGE UP (ref 2–14)
NEUTROPHILS # BLD AUTO: 3.24 K/UL — SIGNIFICANT CHANGE UP (ref 1.8–7.4)
NEUTROPHILS NFR BLD AUTO: 45.9 % — SIGNIFICANT CHANGE UP (ref 43–77)
NRBC # BLD: 0 /100 WBCS — SIGNIFICANT CHANGE UP (ref 0–0)
PHOSPHATE SERPL-MCNC: 4.8 MG/DL — HIGH (ref 2.5–4.5)
PLATELET # BLD AUTO: 303 K/UL — SIGNIFICANT CHANGE UP (ref 150–400)
POTASSIUM SERPL-MCNC: 4.6 MMOL/L — SIGNIFICANT CHANGE UP (ref 3.5–5.3)
POTASSIUM SERPL-SCNC: 4.6 MMOL/L — SIGNIFICANT CHANGE UP (ref 3.5–5.3)
RBC # BLD: 5.13 M/UL — SIGNIFICANT CHANGE UP (ref 4.2–5.8)
RBC # FLD: 12.6 % — SIGNIFICANT CHANGE UP (ref 10.3–14.5)
SODIUM SERPL-SCNC: 138 MMOL/L — SIGNIFICANT CHANGE UP (ref 135–145)
WBC # BLD: 7.06 K/UL — SIGNIFICANT CHANGE UP (ref 3.8–10.5)
WBC # FLD AUTO: 7.06 K/UL — SIGNIFICANT CHANGE UP (ref 3.8–10.5)

## 2024-06-24 PROCEDURE — 93306 TTE W/DOPPLER COMPLETE: CPT | Mod: 26

## 2024-06-24 PROCEDURE — 99233 SBSQ HOSP IP/OBS HIGH 50: CPT | Mod: GC

## 2024-06-24 PROCEDURE — 99232 SBSQ HOSP IP/OBS MODERATE 35: CPT

## 2024-06-24 RX ADMIN — PANTOPRAZOLE SODIUM 40 MILLIGRAM(S): 40 INJECTION, POWDER, FOR SOLUTION INTRAVENOUS at 06:15

## 2024-06-24 RX ADMIN — VANCOMYCIN HYDROCHLORIDE 300 MILLIGRAM(S): KIT at 00:46

## 2024-06-24 RX ADMIN — Medication 650 MILLIGRAM(S): at 00:29

## 2024-06-24 RX ADMIN — OXYCODONE HYDROCHLORIDE 2.5 MILLIGRAM(S): 100 SOLUTION ORAL at 10:23

## 2024-06-24 RX ADMIN — ENOXAPARIN SODIUM 40 MILLIGRAM(S): 100 INJECTION SUBCUTANEOUS at 06:15

## 2024-06-24 RX ADMIN — Medication 400 MILLIGRAM(S): at 14:27

## 2024-06-24 RX ADMIN — Medication 30 MILLILITER(S): at 06:18

## 2024-06-24 RX ADMIN — Medication 400 MILLIGRAM(S): at 23:30

## 2024-06-24 RX ADMIN — Medication 400 MILLIGRAM(S): at 15:27

## 2024-06-24 RX ADMIN — VANCOMYCIN HYDROCHLORIDE 300 MILLIGRAM(S): KIT at 08:54

## 2024-06-24 RX ADMIN — OXYCODONE HYDROCHLORIDE 2.5 MILLIGRAM(S): 100 SOLUTION ORAL at 11:23

## 2024-06-24 RX ADMIN — VANCOMYCIN HYDROCHLORIDE 300 MILLIGRAM(S): KIT at 16:57

## 2024-06-24 RX ADMIN — Medication 30 MILLILITER(S): at 14:27

## 2024-06-24 NOTE — DIETITIAN INITIAL EVALUATION ADULT - ORAL INTAKE PTA/DIET HISTORY
No known food allergies nor food intolerances reported. Pt reported good appetite PTA, denied following any specific diet at home.

## 2024-06-24 NOTE — PROGRESS NOTE ADULT - PROBLEM SELECTOR PLAN 3
Pt. reports smoking crack cocaine and meth; he denies IVDA; Utox +cocaine, THC  -- monitor for withdrawal  -- SBIRT social work eval

## 2024-06-24 NOTE — DIETITIAN INITIAL EVALUATION ADULT - OTHER CALCULATIONS
*Using ideal body weight as pt is >120% ideal body weight. Needs adjusted for wound healing. ideal body weight: 142lbs; % ideal body weight: 123%

## 2024-06-24 NOTE — PROGRESS NOTE ADULT - SUBJECTIVE AND OBJECTIVE BOX
OVERNIGHT EVENTS: Vanc trough 13.4, reodered 1500mg q8h    SUBJECTIVE / INTERVAL HPI: Patient seen and examined at bedside, no new complaints, does not complain of pain, states that he is tolerating diet.     VITAL SIGNS:  Vital Signs Last 24 Hrs  T(C): 36.6 (24 Jun 2024 06:19), Max: 36.9 (23 Jun 2024 21:26)  T(F): 97.9 (24 Jun 2024 06:19), Max: 98.4 (23 Jun 2024 21:26)  HR: 69 (24 Jun 2024 06:19) (63 - 82)  BP: 93/61 (24 Jun 2024 06:19) (93/61 - 138/75)  BP(mean): --  RR: 18 (24 Jun 2024 06:19) (18 - 18)  SpO2: 98% (24 Jun 2024 06:19) (97% - 98%)    Parameters below as of 23 Jun 2024 21:26  Patient On (Oxygen Delivery Method): room air      I&O's Summary    23 Jun 2024 07:01  -  24 Jun 2024 07:00  --------------------------------------------------------  IN: 500 mL / OUT: 0 mL / NET: 500 mL        PHYSICAL EXAM:  GENERAL: no acute distress, lying in bed comfortably  HEAD:  Atraumatic, normocephalic  EYES: PERRLA, conjunctiva and sclera clear  ENT: Moist mucous membranes  NECK: Supple, no JVD  HEART: Regular rate and rhythm, no murmurs, rubs, or gallops  LUNGS: Unlabored respirations.  Clear to auscultation bilaterally, no crackles, wheezing, or rhonchi  ABDOMEN: Soft, nontender, nondistended, +BS  EXTREMITIES: No clubbing, cyanosis, or edema  NERVOUS SYSTEM:  A&Ox3, no focal deficits   SKIN: 3cm x 5cm area of ulceration with purulence and surrounding redness.     MEDICATIONS:  MEDICATIONS  (STANDING):  enoxaparin Injectable 40 milliGRAM(s) SubCutaneous every 24 hours  pantoprazole    Tablet 40 milliGRAM(s) Oral before breakfast  vancomycin  IVPB 1500 milliGRAM(s) IV Intermittent every 8 hours    MEDICATIONS  (PRN):  acetaminophen     Tablet .. 650 milliGRAM(s) Oral every 6 hours PRN Mild Pain (1 - 3)  aluminum hydroxide/magnesium hydroxide/simethicone Suspension 30 milliLiter(s) Oral every 4 hours PRN Dyspepsia  ibuprofen  Tablet. 400 milliGRAM(s) Oral every 6 hours PRN Moderate Pain (4 - 6)  oxyCODONE    IR 2.5 milliGRAM(s) Oral every 4 hours PRN Severe Pain (7 - 10)      ALLERGIES:  Allergies    No Known Allergies    Intolerances        LABS:                        16.0   7.06  )-----------( 303      ( 24 Jun 2024 05:30 )             46.5     06-24    138  |  101  |  15  ----------------------------<  110<H>  4.6   |  27  |  0.75    Ca    9.3      24 Jun 2024 05:30  Phos  4.8     06-24  Mg     2.5     06-24        Urinalysis Basic - ( 24 Jun 2024 05:30 )    Color: x / Appearance: x / SG: x / pH: x  Gluc: 110 mg/dL / Ketone: x  / Bili: x / Urobili: x   Blood: x / Protein: x / Nitrite: x   Leuk Esterase: x / RBC: x / WBC x   Sq Epi: x / Non Sq Epi: x / Bacteria: x      CAPILLARY BLOOD GLUCOSE          RADIOLOGY & ADDITIONAL TESTS: Reviewed.

## 2024-06-24 NOTE — PROGRESS NOTE ADULT - PROBLEM SELECTOR PLAN 1
sepsis POA (WBC, HR); BCx 6/20 growing MRSA in 2/2 bottles; source likely skin (wound above gluteal cleft, drainage cx growing MRSA; folliculitis of extremities); no e/o abscess on CT L-spine; HIV negative; WBC normalized on vanc; surveillance BCx 6/21 NGTD  -- cont. vanc, dose per level; goal trough 13-18; anticipate 4-week course (through 7/19), per ID recs  -- supportive care  -- f/u surveillance cultures  -- check TTE, evaluate for vegetation  -- f/u ID recs

## 2024-06-24 NOTE — PROGRESS NOTE ADULT - SUBJECTIVE AND OBJECTIVE BOX
Patient is a 23y old  Male who presents with a chief complaint of WOUND CHECK     (24 Jun 2024 13:30)      INTERVAL HPI/OVERNIGHT EVENTS:    Pt. seen and examined earlier today  Pt. has no new complaints; he reports less pain at site of lower back wound  Pt. denies F/C, CP, SOB    Review of Systems: 12 point review of systems otherwise negative    MEDICATIONS  (STANDING):  enoxaparin Injectable 40 milliGRAM(s) SubCutaneous every 24 hours  pantoprazole    Tablet 40 milliGRAM(s) Oral before breakfast  vancomycin  IVPB 1500 milliGRAM(s) IV Intermittent every 8 hours    MEDICATIONS  (PRN):  acetaminophen     Tablet .. 650 milliGRAM(s) Oral every 6 hours PRN Mild Pain (1 - 3)  aluminum hydroxide/magnesium hydroxide/simethicone Suspension 30 milliLiter(s) Oral every 4 hours PRN Dyspepsia  ibuprofen  Tablet. 400 milliGRAM(s) Oral every 6 hours PRN Moderate Pain (4 - 6)  oxyCODONE    IR 2.5 milliGRAM(s) Oral every 4 hours PRN Severe Pain (7 - 10)      Allergies    No Known Allergies    Intolerances          Vital Signs Last 24 Hrs  T(C): 36.6 (24 Jun 2024 14:33), Max: 36.9 (23 Jun 2024 21:26)  T(F): 97.9 (24 Jun 2024 14:33), Max: 98.4 (23 Jun 2024 21:26)  HR: 68 (24 Jun 2024 14:33) (68 - 82)  BP: 111/72 (24 Jun 2024 14:33) (93/61 - 138/75)  BP(mean): --  RR: 18 (24 Jun 2024 14:33) (18 - 18)  SpO2: 96% (24 Jun 2024 14:33) (96% - 98%)    Parameters below as of 24 Jun 2024 14:33  Patient On (Oxygen Delivery Method): room air      CAPILLARY BLOOD GLUCOSE          06-23 @ 07:01  -  06-24 @ 07:00  --------------------------------------------------------  IN: 500 mL / OUT: 0 mL / NET: 500 mL    06-24 @ 07:01  -  06-24 @ 15:13  --------------------------------------------------------  IN: 500 mL / OUT: 0 mL / NET: 500 mL        Physical Exam:  (earlier today)  Daily     Daily   General:  non-toxic appearing in NAD, positioned on side  HEENT:  MMM  CV:  RRR  Lungs:  CTA B/L  Abdomen:  soft NT ND  Extremities:  no edema B/L LE  Skin:  WWP, scattered pustules ?folliculitis arms and legs  back exam deferred; per Wound Care RN exam, ~3x3cm full-thickness wound above gluteal cleft, less than 0.5cm depth, TTP, no fluctuance   Neuro:  A&Ox3    LABS:                        16.0   7.06  )-----------( 303      ( 24 Jun 2024 05:30 )             46.5     06-24    138  |  101  |  15  ----------------------------<  110<H>  4.6   |  27  |  0.75    Ca    9.3      24 Jun 2024 05:30  Phos  4.8     06-24  Mg     2.5     06-24        Urinalysis Basic - ( 24 Jun 2024 05:30 )    Color: x / Appearance: x / SG: x / pH: x  Gluc: 110 mg/dL / Ketone: x  / Bili: x / Urobili: x   Blood: x / Protein: x / Nitrite: x   Leuk Esterase: x / RBC: x / WBC x   Sq Epi: x / Non Sq Epi: x / Bacteria: x

## 2024-06-24 NOTE — ADVANCED PRACTICE NURSE CONSULT - REASON FOR CONSULT
lower back wound    Per chart review, 23-year-old male with substance use disorder with complaints of painful ulceration with purulent drainage of mid lower back.

## 2024-06-24 NOTE — DIETITIAN INITIAL EVALUATION ADULT - OTHER INFO
Per H&P: 23-year-old male with substance use disorder with complaints of painful ulceration with purulent drainage of mid lower back.  He reports the lesion started 4 days ago, was initially small, he picked at it after which it grew in size and started to drain.  He reports fever, chills, no nausea, vomiting. For the last 2 days pain has been too intense that he hasn't been able to lie on his back.  Reports he smokes cocaine and meth but no IV drug use.  He does not have any medical conditions, does not take any medications.  ROS other medications.     Met with pt this AM at bedside. Pt was lying down, resting, and able to articulate his nutrition hx well. No known food allergies nor food intolerances reported. Pt reported good appetite PTA, which persists, denied following any specific diet at home. Pt denied chewing/swallowing difficulties. Pt denied nausea/vomiting/diarrhea/constipation, stated last BM this AM 6/24. Pt could not recall usual body weight, however denied any recent wt loss/gain. RD reviewed protein sources (chicken, fish, beans, nut, etc.) and encouraged adequate protein consumption in order to regain strength, maintain muscle mass and reach adequate nutritional status. Pt was accepting to RD suggestion and asking appropriate questions. Pt without overt visual muscle or subcutaneous fat wasting.

## 2024-06-24 NOTE — PROGRESS NOTE ADULT - SUBJECTIVE AND OBJECTIVE BOX
Infectious Disease Progress Note    Interval Events:    Subjective:  Patient seen and evaluated at bedside. Reports abscess is much improved, still having pain.     ROS:  negative except as above      ANTIBIOTICS/RELEVANT:  antimicrobials  vancomycin  IVPB 1500 milliGRAM(s) IV Intermittent every 8 hours    immunologic:    OTHER:  acetaminophen     Tablet .. 650 milliGRAM(s) Oral every 6 hours PRN  aluminum hydroxide/magnesium hydroxide/simethicone Suspension 30 milliLiter(s) Oral every 4 hours PRN  enoxaparin Injectable 40 milliGRAM(s) SubCutaneous every 24 hours  ibuprofen  Tablet. 400 milliGRAM(s) Oral every 6 hours PRN  oxyCODONE    IR 2.5 milliGRAM(s) Oral every 4 hours PRN  pantoprazole    Tablet 40 milliGRAM(s) Oral before breakfast      Allergies    No Known Allergies    Intolerances        Objective:  Vital Signs Last 24 Hrs  T(C): 36.7 (24 Jun 2024 09:04), Max: 36.9 (23 Jun 2024 21:26)  T(F): 98.1 (24 Jun 2024 09:04), Max: 98.4 (23 Jun 2024 21:26)  HR: 72 (24 Jun 2024 09:04) (69 - 82)  BP: 116/61 (24 Jun 2024 09:04) (93/61 - 138/75)  BP(mean): --  RR: 18 (24 Jun 2024 09:04) (18 - 18)  SpO2: 96% (24 Jun 2024 09:04) (96% - 98%)    Parameters below as of 24 Jun 2024 09:04  Patient On (Oxygen Delivery Method): room air      PHYSICAL EXAM  CONSTITUTIONAL: Well groomed, no apparent distress  EYES: No conjunctival or scleral injection, non-icteric  ENMT: Oral mucosa with moist membranes. Normal dentition; no pharyngeal injection or exudates  RESP: No respiratory distress, no use of accessory muscles; CTA b/l, no WRR  CV: RRR, +S1S2, no MRG; no JVD; no peripheral edema  GI: Soft, NT, ND, no rebound, no guarding; no palpable masses; no hepatosplenomegaly; no hernia palpated  SKIN: greatly improved erythematous wound with purulence, no fluctuance located just above gluteal cleft   PSYCH: Appropriate insight/judgment; A+O x 3, mood and affect appropriate, recent/remote memory intact      LABS:                        16.0   7.06  )-----------( 303      ( 24 Jun 2024 05:30 )             46.5     06-24    138  |  101  |  15  ----------------------------<  110<H>  4.6   |  27  |  0.75    Ca    9.3      24 Jun 2024 05:30  Phos  4.8     06-24  Mg     2.5     06-24        Urinalysis Basic - ( 24 Jun 2024 05:30 )    Color: x / Appearance: x / SG: x / pH: x  Gluc: 110 mg/dL / Ketone: x  / Bili: x / Urobili: x   Blood: x / Protein: x / Nitrite: x   Leuk Esterase: x / RBC: x / WBC x   Sq Epi: x / Non Sq Epi: x / Bacteria: x        MICROBIOLOGY:            RECENT CULTURES:  06-22 @ 12:14  .Blood Blood-Venous  --  --  --    No growth at 24 hours  --  06-21 @ 12:35  .Blood Blood  --  --  --    No growth at 48 Hours  --  06-20 @ 10:07  .Abscess back cyst  Methicillin resistant Staphylococcus aureus  Methicillin resistant Staphylococcus aureus  AISHA    Numerous Methicillin Resistant Staphylococcus aureus  Numerous Streptococcus dysgalactiae (Group C/G) "Susceptibilities not  performed"  --  06-20 @ 08:53  .Blood Blood-Peripheral  Blood Culture PCR  Methicillin resistant Staphylococcus aureus  Blood Culture PCR  PCR    Growth in aerobic bottle: Methicillin Resistant Staphylococcus aureus  See previous culture [id number]  --      RADIOLOGY & ADDITIONAL STUDIES:

## 2024-06-24 NOTE — DIETITIAN INITIAL EVALUATION ADULT - PERTINENT MEDS FT
MEDICATIONS  (STANDING):  enoxaparin Injectable 40 milliGRAM(s) SubCutaneous every 24 hours  pantoprazole    Tablet 40 milliGRAM(s) Oral before breakfast  vancomycin  IVPB 1500 milliGRAM(s) IV Intermittent every 8 hours    MEDICATIONS  (PRN):  acetaminophen     Tablet .. 650 milliGRAM(s) Oral every 6 hours PRN Mild Pain (1 - 3)  aluminum hydroxide/magnesium hydroxide/simethicone Suspension 30 milliLiter(s) Oral every 4 hours PRN Dyspepsia  ibuprofen  Tablet. 400 milliGRAM(s) Oral every 6 hours PRN Moderate Pain (4 - 6)  oxyCODONE    IR 2.5 milliGRAM(s) Oral every 4 hours PRN Severe Pain (7 - 10)

## 2024-06-24 NOTE — DIETITIAN INITIAL EVALUATION ADULT - NUTRITIONGOAL OUTCOME1
Pt to meet >/= 75% estimated protein needs; show improvements in skin integrity throughout hospitalization

## 2024-06-24 NOTE — DIETITIAN INITIAL EVALUATION ADULT - PERTINENT LABORATORY DATA
06-24    138  |  101  |  15  ----------------------------<  110<H>  4.6   |  27  |  0.75    Ca    9.3      24 Jun 2024 05:30  Phos  4.8     06-24  Mg     2.5     06-24

## 2024-06-24 NOTE — PROGRESS NOTE ADULT - ASSESSMENT
IMPRESSION:  MRSA bacteremia secondary to pilonidal abscess.  Improved from ID standpoint, abscess much improved appearance on physical exam.  Abscess culture also with group C/G strep.  Unclear significance but vancomycin will cover this as well.   Surveillance blood cultures 6/21 and 6/22 NGTD.      Final Recommendations:   1. Continue Vancomycin 1500 mg IV q8hrs for 4 week total course (6/21 - 7/19) for MRSA bacteremia  2. Follow up TTE, if negative for vegetations, pt is ok to be discharged on above regimen  3. Weekly labs: CBC w diff, CMP, vanc trough faxed to Dr. Shaw at [phone]    ID team 1 will sign off

## 2024-06-24 NOTE — ADVANCED PRACTICE NURSE CONSULT - RECOMMEDATIONS
Lower back: Vashe moist 2x2 gauze, foam BID    Discussed with pt, primary RN and Dr Gilliland    Please reconsult if wound deteriorates or new concerns arise.    Total time spent: 30 minutes

## 2024-06-24 NOTE — DIETITIAN INITIAL EVALUATION ADULT - ADD RECOMMEND
1. Continue with Regular diet  - Encourage adequate PO and protein intake  - Honor food preferences, as medically able  2. Recommend MVI for general nutrient coverage  3. Appreciate weekly weight trends

## 2024-06-25 LAB
ANION GAP SERPL CALC-SCNC: 9 MMOL/L — SIGNIFICANT CHANGE UP (ref 5–17)
BASOPHILS # BLD AUTO: 0.06 K/UL — SIGNIFICANT CHANGE UP (ref 0–0.2)
BASOPHILS NFR BLD AUTO: 0.7 % — SIGNIFICANT CHANGE UP (ref 0–2)
BUN SERPL-MCNC: 19 MG/DL — SIGNIFICANT CHANGE UP (ref 7–23)
CALCIUM SERPL-MCNC: 9.9 MG/DL — SIGNIFICANT CHANGE UP (ref 8.4–10.5)
CHLORIDE SERPL-SCNC: 97 MMOL/L — SIGNIFICANT CHANGE UP (ref 96–108)
CO2 SERPL-SCNC: 30 MMOL/L — SIGNIFICANT CHANGE UP (ref 22–31)
CREAT SERPL-MCNC: 0.86 MG/DL — SIGNIFICANT CHANGE UP (ref 0.5–1.3)
CULTURE RESULTS: ABNORMAL
EGFR: 123 ML/MIN/1.73M2 — SIGNIFICANT CHANGE UP
EOSINOPHIL # BLD AUTO: 0.37 K/UL — SIGNIFICANT CHANGE UP (ref 0–0.5)
EOSINOPHIL NFR BLD AUTO: 4.1 % — SIGNIFICANT CHANGE UP (ref 0–6)
GLUCOSE SERPL-MCNC: 99 MG/DL — SIGNIFICANT CHANGE UP (ref 70–99)
HCT VFR BLD CALC: 50.2 % — HIGH (ref 39–50)
HGB BLD-MCNC: 16.2 G/DL — SIGNIFICANT CHANGE UP (ref 13–17)
IMM GRANULOCYTES NFR BLD AUTO: 1.4 % — HIGH (ref 0–0.9)
LYMPHOCYTES # BLD AUTO: 2.67 K/UL — SIGNIFICANT CHANGE UP (ref 1–3.3)
LYMPHOCYTES # BLD AUTO: 29.4 % — SIGNIFICANT CHANGE UP (ref 13–44)
MAGNESIUM SERPL-MCNC: 2.5 MG/DL — SIGNIFICANT CHANGE UP (ref 1.6–2.6)
MCHC RBC-ENTMCNC: 30.9 PG — SIGNIFICANT CHANGE UP (ref 27–34)
MCHC RBC-ENTMCNC: 32.3 GM/DL — SIGNIFICANT CHANGE UP (ref 32–36)
MCV RBC AUTO: 95.8 FL — SIGNIFICANT CHANGE UP (ref 80–100)
MONOCYTES # BLD AUTO: 0.72 K/UL — SIGNIFICANT CHANGE UP (ref 0–0.9)
MONOCYTES NFR BLD AUTO: 7.9 % — SIGNIFICANT CHANGE UP (ref 2–14)
NEUTROPHILS # BLD AUTO: 5.13 K/UL — SIGNIFICANT CHANGE UP (ref 1.8–7.4)
NEUTROPHILS NFR BLD AUTO: 56.5 % — SIGNIFICANT CHANGE UP (ref 43–77)
NRBC # BLD: 0 /100 WBCS — SIGNIFICANT CHANGE UP (ref 0–0)
ORGANISM # SPEC MICROSCOPIC CNT: ABNORMAL
ORGANISM # SPEC MICROSCOPIC CNT: SIGNIFICANT CHANGE UP
PHOSPHATE SERPL-MCNC: 4.6 MG/DL — HIGH (ref 2.5–4.5)
PLATELET # BLD AUTO: 366 K/UL — SIGNIFICANT CHANGE UP (ref 150–400)
POTASSIUM SERPL-MCNC: 4.3 MMOL/L — SIGNIFICANT CHANGE UP (ref 3.5–5.3)
POTASSIUM SERPL-SCNC: 4.3 MMOL/L — SIGNIFICANT CHANGE UP (ref 3.5–5.3)
RBC # BLD: 5.24 M/UL — SIGNIFICANT CHANGE UP (ref 4.2–5.8)
RBC # FLD: 12.5 % — SIGNIFICANT CHANGE UP (ref 10.3–14.5)
SODIUM SERPL-SCNC: 136 MMOL/L — SIGNIFICANT CHANGE UP (ref 135–145)
SPECIMEN SOURCE: SIGNIFICANT CHANGE UP
VANCOMYCIN TROUGH SERPL-MCNC: 12 UG/ML — SIGNIFICANT CHANGE UP (ref 10–20)
WBC # BLD: 9.08 K/UL — SIGNIFICANT CHANGE UP (ref 3.8–10.5)
WBC # FLD AUTO: 9.08 K/UL — SIGNIFICANT CHANGE UP (ref 3.8–10.5)

## 2024-06-25 PROCEDURE — 99233 SBSQ HOSP IP/OBS HIGH 50: CPT | Mod: GC

## 2024-06-25 RX ORDER — VANCOMYCIN HYDROCHLORIDE 50 MG/ML
1750 KIT ORAL EVERY 8 HOURS
Refills: 0 | Status: COMPLETED | OUTPATIENT
Start: 2024-06-25 | End: 2024-06-25

## 2024-06-25 RX ADMIN — PANTOPRAZOLE SODIUM 40 MILLIGRAM(S): 40 INJECTION, POWDER, FOR SOLUTION INTRAVENOUS at 06:45

## 2024-06-25 RX ADMIN — Medication 400 MILLIGRAM(S): at 23:00

## 2024-06-25 RX ADMIN — Medication 400 MILLIGRAM(S): at 06:06

## 2024-06-25 RX ADMIN — VANCOMYCIN HYDROCHLORIDE 250 MILLIGRAM(S): KIT at 22:00

## 2024-06-25 RX ADMIN — VANCOMYCIN HYDROCHLORIDE 250 MILLIGRAM(S): KIT at 03:00

## 2024-06-25 RX ADMIN — Medication 400 MILLIGRAM(S): at 00:14

## 2024-06-25 RX ADMIN — VANCOMYCIN HYDROCHLORIDE 250 MILLIGRAM(S): KIT at 13:34

## 2024-06-25 RX ADMIN — Medication 30 MILLILITER(S): at 22:55

## 2024-06-25 RX ADMIN — ENOXAPARIN SODIUM 40 MILLIGRAM(S): 100 INJECTION SUBCUTANEOUS at 13:34

## 2024-06-25 RX ADMIN — Medication 30 MILLILITER(S): at 05:53

## 2024-06-25 RX ADMIN — Medication 400 MILLIGRAM(S): at 05:54

## 2024-06-25 RX ADMIN — Medication 30 MILLILITER(S): at 13:35

## 2024-06-25 RX ADMIN — Medication 400 MILLIGRAM(S): at 22:55

## 2024-06-25 NOTE — PROGRESS NOTE ADULT - PROBLEM SELECTOR PLAN 1
sepsis POA (WBC, HR); BCx 6/20 growing MRSA in 2/2 bottles; source likely skin (wound above gluteal cleft, drainage cx growing MRSA; folliculitis of extremities); no e/o abscess on CT L-spine; HIV negative; WBC normalized on vanc; surveillance BCx 6/21 NGTD; no e/o IE on TTE, pretest probability not sufficiently high to warrant AUBREY  -- cont. vanc, dose per level; goal trough 13-18; anticipate 4-week course (through 7/19), per ID recs  -- supportive care  -- f/u surveillance cultures

## 2024-06-25 NOTE — PROGRESS NOTE ADULT - SUBJECTIVE AND OBJECTIVE BOX
OVERNIGHT EVENTS: NAEON    SUBJECTIVE / INTERVAL HPI: Patient seen and examined at bedside, pain controlled, resting comfortable, tolerating diet.     VITAL SIGNS:  Vital Signs Last 24 Hrs  T(C): 36.6 (25 Jun 2024 06:12), Max: 36.6 (24 Jun 2024 14:33)  T(F): 97.8 (25 Jun 2024 06:12), Max: 97.9 (24 Jun 2024 14:33)  HR: 65 (25 Jun 2024 06:12) (65 - 72)  BP: 112/65 (25 Jun 2024 06:12) (111/72 - 112/68)  BP(mean): --  RR: 18 (25 Jun 2024 06:12) (16 - 18)  SpO2: 97% (25 Jun 2024 06:12) (96% - 97%)    Parameters below as of 24 Jun 2024 23:00  Patient On (Oxygen Delivery Method): room air      I&O's Summary    24 Jun 2024 07:01  -  25 Jun 2024 07:00  --------------------------------------------------------  IN: 1000 mL / OUT: 0 mL / NET: 1000 mL        PHYSICAL EXAM:    GENERAL: no acute distress, lying in bed comfortably  HEAD:  Atraumatic, normocephalic  EYES: PERRLA, conjunctiva and sclera clear  ENT: Moist mucous membranes  NECK: Supple, no JVD  HEART: Regular rate and rhythm, no murmurs, rubs, or gallops  LUNGS: Unlabored respirations.  Clear to auscultation bilaterally, no crackles, wheezing, or rhonchi  ABDOMEN: Soft, nontender, nondistended, +BS  EXTREMITIES: No clubbing, cyanosis, or edema  NERVOUS SYSTEM:  A&Ox3, no focal deficits   SKIN: 3cm x 5cm area of ulceration with purulence and surrounding redness.     MEDICATIONS:  MEDICATIONS  (STANDING):  enoxaparin Injectable 40 milliGRAM(s) SubCutaneous every 24 hours  pantoprazole    Tablet 40 milliGRAM(s) Oral before breakfast  vancomycin  IVPB 1750 milliGRAM(s) IV Intermittent every 8 hours    MEDICATIONS  (PRN):  acetaminophen     Tablet .. 650 milliGRAM(s) Oral every 6 hours PRN Mild Pain (1 - 3)  aluminum hydroxide/magnesium hydroxide/simethicone Suspension 30 milliLiter(s) Oral every 4 hours PRN Dyspepsia  ibuprofen  Tablet. 400 milliGRAM(s) Oral every 6 hours PRN Moderate Pain (4 - 6)  oxyCODONE    IR 2.5 milliGRAM(s) Oral every 4 hours PRN Severe Pain (7 - 10)      ALLERGIES:  Allergies    No Known Allergies    Intolerances        LABS:                        16.0   7.06  )-----------( 303      ( 24 Jun 2024 05:30 )             46.5     06-24    138  |  101  |  15  ----------------------------<  110<H>  4.6   |  27  |  0.75    Ca    9.3      24 Jun 2024 05:30  Phos  4.8     06-24  Mg     2.5     06-24        Urinalysis Basic - ( 24 Jun 2024 05:30 )    Color: x / Appearance: x / SG: x / pH: x  Gluc: 110 mg/dL / Ketone: x  / Bili: x / Urobili: x   Blood: x / Protein: x / Nitrite: x   Leuk Esterase: x / RBC: x / WBC x   Sq Epi: x / Non Sq Epi: x / Bacteria: x      CAPILLARY BLOOD GLUCOSE          RADIOLOGY & ADDITIONAL TESTS: Reviewed.

## 2024-06-25 NOTE — PROGRESS NOTE ADULT - SUBJECTIVE AND OBJECTIVE BOX
Patient is a 23y old  Male who presents with a chief complaint of WOUND CHECK     (24 Jun 2024 13:30)      INTERVAL HPI/OVERNIGHT EVENTS:    Pt. seen and examined earlier today  Pt. feels well, denies back pain  No F/C    Review of Systems: 12 point review of systems otherwise negative    MEDICATIONS  (STANDING):  enoxaparin Injectable 40 milliGRAM(s) SubCutaneous every 24 hours  pantoprazole    Tablet 40 milliGRAM(s) Oral before breakfast  vancomycin  IVPB 1750 milliGRAM(s) IV Intermittent every 8 hours    MEDICATIONS  (PRN):  acetaminophen     Tablet .. 650 milliGRAM(s) Oral every 6 hours PRN Mild Pain (1 - 3)  aluminum hydroxide/magnesium hydroxide/simethicone Suspension 30 milliLiter(s) Oral every 4 hours PRN Dyspepsia  ibuprofen  Tablet. 400 milliGRAM(s) Oral every 6 hours PRN Moderate Pain (4 - 6)  oxyCODONE    IR 2.5 milliGRAM(s) Oral every 4 hours PRN Severe Pain (7 - 10)      Allergies    No Known Allergies    Intolerances          Vital Signs Last 24 Hrs  T(C): 36.6 (25 Jun 2024 09:45), Max: 36.6 (24 Jun 2024 14:33)  T(F): 97.8 (25 Jun 2024 09:45), Max: 97.9 (24 Jun 2024 14:33)  HR: 68 (25 Jun 2024 09:45) (65 - 72)  BP: 112/69 (25 Jun 2024 09:45) (111/72 - 112/69)  BP(mean): --  RR: 18 (25 Jun 2024 09:45) (16 - 18)  SpO2: 98% (25 Jun 2024 09:45) (96% - 98%)    Parameters below as of 25 Jun 2024 09:45  Patient On (Oxygen Delivery Method): room air      CAPILLARY BLOOD GLUCOSE          06-24 @ 07:01  -  06-25 @ 07:00  --------------------------------------------------------  IN: 1000 mL / OUT: 0 mL / NET: 1000 mL        Physical Exam:  (earlier today)  Daily     Daily   General:  non-toxic appearing in NAD, sitting up in bed eating lunch  HEENT:  MMM  CV:  RRR  Lungs:  CTA B/L  Abdomen:  soft NT ND  Extremities:  no edema B/L LE  Skin:  WWP, scattered pustules ?folliculitis arms and legs  lower back dressing C/D/I; per Wound Care RN exam, ~3x3cm full-thickness wound above gluteal cleft, less than 0.5cm depth, TTP, no fluctuance   Neuro:  A&Ox3    LABS:                        16.2   9.08  )-----------( 366      ( 25 Jun 2024 05:30 )             50.2     06-25    136  |  97  |  19  ----------------------------<  99  4.3   |  30  |  0.86    Ca    9.9      25 Jun 2024 05:30  Phos  4.6     06-25  Mg     2.5     06-25        Urinalysis Basic - ( 25 Jun 2024 05:30 )    Color: x / Appearance: x / SG: x / pH: x  Gluc: 99 mg/dL / Ketone: x  / Bili: x / Urobili: x   Blood: x / Protein: x / Nitrite: x   Leuk Esterase: x / RBC: x / WBC x   Sq Epi: x / Non Sq Epi: x / Bacteria: x

## 2024-06-25 NOTE — PROGRESS NOTE ADULT - PROBLEM SELECTOR PLAN 1
sepsis POA (WBC, HR); BCx 6/20 growing MRSA in 2/2 bottles; source likely skin (wound above gluteal cleft, drainage cx growing MRSA; folliculitis of extremities); no e/o abscess on CT L-spine; HIV negative; WBC normalized on vanc; surveillance BCx 6/21 NGTD  -- cont. vanc, dose per level; goal trough 13-18; anticipate 4-week course (through 7/19), per ID recs  -- supportive care  -- f/u surveillance cultures  -- TTE: No signs of endocardiits, LVEF 65%  -- Per ID: Continue Vancomycin 1500 mg IV q8hrs for 4 week total course (6/21 - 7/19) for MRSA bacteremia, Follow up TTE, if negative for vegetations, pt is ok to be discharged on above regimen, Weekly labs: CBC w diff, CMP, vanc trough faxed to Dr. Shaw at [phone]

## 2024-06-26 LAB
ANION GAP SERPL CALC-SCNC: 10 MMOL/L — SIGNIFICANT CHANGE UP (ref 5–17)
BASOPHILS # BLD AUTO: 0.05 K/UL — SIGNIFICANT CHANGE UP (ref 0–0.2)
BASOPHILS NFR BLD AUTO: 0.4 % — SIGNIFICANT CHANGE UP (ref 0–2)
BUN SERPL-MCNC: 16 MG/DL — SIGNIFICANT CHANGE UP (ref 7–23)
CALCIUM SERPL-MCNC: 9.6 MG/DL — SIGNIFICANT CHANGE UP (ref 8.4–10.5)
CHLORIDE SERPL-SCNC: 99 MMOL/L — SIGNIFICANT CHANGE UP (ref 96–108)
CO2 SERPL-SCNC: 27 MMOL/L — SIGNIFICANT CHANGE UP (ref 22–31)
CREAT SERPL-MCNC: 0.82 MG/DL — SIGNIFICANT CHANGE UP (ref 0.5–1.3)
CULTURE RESULTS: SIGNIFICANT CHANGE UP
EGFR: 125 ML/MIN/1.73M2 — SIGNIFICANT CHANGE UP
EOSINOPHIL # BLD AUTO: 0.47 K/UL — SIGNIFICANT CHANGE UP (ref 0–0.5)
EOSINOPHIL NFR BLD AUTO: 3.8 % — SIGNIFICANT CHANGE UP (ref 0–6)
GLUCOSE SERPL-MCNC: 101 MG/DL — HIGH (ref 70–99)
HCT VFR BLD CALC: 47.5 % — SIGNIFICANT CHANGE UP (ref 39–50)
HGB BLD-MCNC: 15.8 G/DL — SIGNIFICANT CHANGE UP (ref 13–17)
IMM GRANULOCYTES NFR BLD AUTO: 1.9 % — HIGH (ref 0–0.9)
LYMPHOCYTES # BLD AUTO: 2.88 K/UL — SIGNIFICANT CHANGE UP (ref 1–3.3)
LYMPHOCYTES # BLD AUTO: 23.4 % — SIGNIFICANT CHANGE UP (ref 13–44)
MAGNESIUM SERPL-MCNC: 2.4 MG/DL — SIGNIFICANT CHANGE UP (ref 1.6–2.6)
MCHC RBC-ENTMCNC: 30.8 PG — SIGNIFICANT CHANGE UP (ref 27–34)
MCHC RBC-ENTMCNC: 33.3 GM/DL — SIGNIFICANT CHANGE UP (ref 32–36)
MCV RBC AUTO: 92.6 FL — SIGNIFICANT CHANGE UP (ref 80–100)
MONOCYTES # BLD AUTO: 1.01 K/UL — HIGH (ref 0–0.9)
MONOCYTES NFR BLD AUTO: 8.2 % — SIGNIFICANT CHANGE UP (ref 2–14)
NEUTROPHILS # BLD AUTO: 7.67 K/UL — HIGH (ref 1.8–7.4)
NEUTROPHILS NFR BLD AUTO: 62.3 % — SIGNIFICANT CHANGE UP (ref 43–77)
NONVAR ORTHPX DNA SPEC QL NAA+PROBE: SIGNIFICANT CHANGE UP
NRBC # BLD: 0 /100 WBCS — SIGNIFICANT CHANGE UP (ref 0–0)
PHOSPHATE SERPL-MCNC: 5.1 MG/DL — HIGH (ref 2.5–4.5)
PLATELET # BLD AUTO: 360 K/UL — SIGNIFICANT CHANGE UP (ref 150–400)
POTASSIUM SERPL-MCNC: 4.9 MMOL/L — SIGNIFICANT CHANGE UP (ref 3.5–5.3)
POTASSIUM SERPL-SCNC: 4.9 MMOL/L — SIGNIFICANT CHANGE UP (ref 3.5–5.3)
RBC # BLD: 5.13 M/UL — SIGNIFICANT CHANGE UP (ref 4.2–5.8)
RBC # FLD: 12 % — SIGNIFICANT CHANGE UP (ref 10.3–14.5)
SODIUM SERPL-SCNC: 136 MMOL/L — SIGNIFICANT CHANGE UP (ref 135–145)
SPECIMEN SOURCE: SIGNIFICANT CHANGE UP
VANCOMYCIN TROUGH SERPL-MCNC: 17.4 UG/ML — SIGNIFICANT CHANGE UP (ref 10–20)
WBC # BLD: 12.31 K/UL — HIGH (ref 3.8–10.5)
WBC # FLD AUTO: 12.31 K/UL — HIGH (ref 3.8–10.5)

## 2024-06-26 PROCEDURE — 99233 SBSQ HOSP IP/OBS HIGH 50: CPT | Mod: GC

## 2024-06-26 RX ORDER — VANCOMYCIN HYDROCHLORIDE 50 MG/ML
1750 KIT ORAL EVERY 8 HOURS
Refills: 0 | Status: COMPLETED | OUTPATIENT
Start: 2024-06-26 | End: 2024-06-27

## 2024-06-26 RX ADMIN — Medication 30 MILLILITER(S): at 22:31

## 2024-06-26 RX ADMIN — Medication 400 MILLIGRAM(S): at 22:24

## 2024-06-26 RX ADMIN — PANTOPRAZOLE SODIUM 40 MILLIGRAM(S): 40 INJECTION, POWDER, FOR SOLUTION INTRAVENOUS at 07:34

## 2024-06-26 RX ADMIN — Medication 30 MILLILITER(S): at 07:33

## 2024-06-26 RX ADMIN — Medication 400 MILLIGRAM(S): at 09:00

## 2024-06-26 RX ADMIN — Medication 400 MILLIGRAM(S): at 07:34

## 2024-06-26 RX ADMIN — ENOXAPARIN SODIUM 40 MILLIGRAM(S): 100 INJECTION SUBCUTANEOUS at 15:10

## 2024-06-26 RX ADMIN — VANCOMYCIN HYDROCHLORIDE 250 MILLIGRAM(S): KIT at 15:10

## 2024-06-26 RX ADMIN — VANCOMYCIN HYDROCHLORIDE 250 MILLIGRAM(S): KIT at 22:24

## 2024-06-26 RX ADMIN — Medication 400 MILLIGRAM(S): at 23:20

## 2024-06-26 NOTE — PROGRESS NOTE ADULT - SUBJECTIVE AND OBJECTIVE BOX
Patient is a 23y old  Male who presents with a chief complaint of WOUND CHECK     (24 Jun 2024 13:30)      INTERVAL HPI/OVERNIGHT EVENTS:    Pt. seen and examined earlier today  Pt. feels well, has no complaints denies pain  No fevers    Review of Systems: 12 point review of systems otherwise negative    MEDICATIONS  (STANDING):  enoxaparin Injectable 40 milliGRAM(s) SubCutaneous every 24 hours  pantoprazole    Tablet 40 milliGRAM(s) Oral before breakfast  vancomycin  IVPB 1750 milliGRAM(s) IV Intermittent every 8 hours    MEDICATIONS  (PRN):  acetaminophen     Tablet .. 650 milliGRAM(s) Oral every 6 hours PRN Mild Pain (1 - 3)  aluminum hydroxide/magnesium hydroxide/simethicone Suspension 30 milliLiter(s) Oral every 4 hours PRN Dyspepsia  ibuprofen  Tablet. 400 milliGRAM(s) Oral every 6 hours PRN Moderate Pain (4 - 6)  oxyCODONE    IR 2.5 milliGRAM(s) Oral every 4 hours PRN Severe Pain (7 - 10)      Allergies    No Known Allergies    Intolerances          Vital Signs Last 24 Hrs  T(C): 36.7 (26 Jun 2024 10:11), Max: 37.1 (25 Jun 2024 21:16)  T(F): 98.1 (26 Jun 2024 10:11), Max: 98.7 (25 Jun 2024 21:16)  HR: 66 (26 Jun 2024 10:11) (66 - 76)  BP: 99/64 (26 Jun 2024 10:11) (99/64 - 119/75)  BP(mean): --  RR: 17 (26 Jun 2024 10:11) (17 - 18)  SpO2: 99% (26 Jun 2024 10:11) (95% - 99%)    Parameters below as of 26 Jun 2024 10:11  Patient On (Oxygen Delivery Method): room air      CAPILLARY BLOOD GLUCOSE          06-25 @ 07:01  -  06-26 @ 07:00  --------------------------------------------------------  IN: 750 mL / OUT: 400 mL / NET: 350 mL        Physical Exam:  (earlier today)  Daily     Daily   General:  non-toxic appearing in NAD  HEENT:  MMM  lower back dressing C/D/I; no visible erythema  Neuro:  A&Ox3  rest of exam per PGY-1    LABS:                        15.8   12.31 )-----------( 360      ( 26 Jun 2024 04:57 )             47.5     06-26    136  |  99  |  16  ----------------------------<  101<H>  4.9   |  27  |  0.82    Ca    9.6      26 Jun 2024 04:57  Phos  5.1     06-26  Mg     2.4     06-26        Urinalysis Basic - ( 26 Jun 2024 04:57 )    Color: x / Appearance: x / SG: x / pH: x  Gluc: 101 mg/dL / Ketone: x  / Bili: x / Urobili: x   Blood: x / Protein: x / Nitrite: x   Leuk Esterase: x / RBC: x / WBC x   Sq Epi: x / Non Sq Epi: x / Bacteria: x

## 2024-06-26 NOTE — PROGRESS NOTE ADULT - SUBJECTIVE AND OBJECTIVE BOX
OVERNIGHT EVENTS: NAEON    SUBJECTIVE / INTERVAL HPI: Patient seen and examined at bedside, pain controlled, resting comfortable, tolerating diet, understands that he will need to be on IV abx for 4 weeks.    VITAL SIGNS:  Vital Signs Last 24 Hrs  T(C): 37.1 (25 Jun 2024 21:16), Max: 37.1 (25 Jun 2024 21:16)  T(F): 98.7 (25 Jun 2024 21:16), Max: 98.7 (25 Jun 2024 21:16)  HR: 67 (25 Jun 2024 21:16) (67 - 76)  BP: 111/62 (25 Jun 2024 21:16) (111/62 - 119/75)  BP(mean): --  RR: 18 (25 Jun 2024 21:16) (18 - 18)  SpO2: 95% (25 Jun 2024 21:16) (95% - 98%)    Parameters below as of 25 Jun 2024 21:16  Patient On (Oxygen Delivery Method): room air      I&O's Summary    25 Jun 2024 07:01  -  26 Jun 2024 07:00  --------------------------------------------------------  IN: 750 mL / OUT: 400 mL / NET: 350 mL        PHYSICAL EXAM:    GENERAL: no acute distress, lying in bed comfortably  HEAD:  Atraumatic, normocephalic  EYES: PERRLA, conjunctiva and sclera clear  ENT: Moist mucous membranes  NECK: Supple, no JVD  HEART: Regular rate and rhythm, no murmurs, rubs, or gallops  LUNGS: Unlabored respirations.  Clear to auscultation bilaterally, no crackles, wheezing, or rhonchi  ABDOMEN: Soft, nontender, nondistended, +BS  EXTREMITIES: No clubbing, cyanosis, or edema  NERVOUS SYSTEM:  A&Ox3, no focal deficits   SKIN: 3cm x 5cm area of ulceration with purulence and surrounding redness.     MEDICATIONS:  MEDICATIONS  (STANDING):  enoxaparin Injectable 40 milliGRAM(s) SubCutaneous every 24 hours  pantoprazole    Tablet 40 milliGRAM(s) Oral before breakfast    MEDICATIONS  (PRN):  acetaminophen     Tablet .. 650 milliGRAM(s) Oral every 6 hours PRN Mild Pain (1 - 3)  aluminum hydroxide/magnesium hydroxide/simethicone Suspension 30 milliLiter(s) Oral every 4 hours PRN Dyspepsia  ibuprofen  Tablet. 400 milliGRAM(s) Oral every 6 hours PRN Moderate Pain (4 - 6)  oxyCODONE    IR 2.5 milliGRAM(s) Oral every 4 hours PRN Severe Pain (7 - 10)      ALLERGIES:  Allergies    No Known Allergies    Intolerances        LABS:                        15.8   12.31 )-----------( 360      ( 26 Jun 2024 04:57 )             47.5     06-26    136  |  99  |  16  ----------------------------<  101<H>  4.9   |  27  |  0.82    Ca    9.6      26 Jun 2024 04:57  Phos  5.1     06-26  Mg     2.4     06-26        Urinalysis Basic - ( 26 Jun 2024 04:57 )    Color: x / Appearance: x / SG: x / pH: x  Gluc: 101 mg/dL / Ketone: x  / Bili: x / Urobili: x   Blood: x / Protein: x / Nitrite: x   Leuk Esterase: x / RBC: x / WBC x   Sq Epi: x / Non Sq Epi: x / Bacteria: x      CAPILLARY BLOOD GLUCOSE          RADIOLOGY & ADDITIONAL TESTS: Reviewed.

## 2024-06-27 DIAGNOSIS — K21.9 GASTRO-ESOPHAGEAL REFLUX DISEASE WITHOUT ESOPHAGITIS: ICD-10-CM

## 2024-06-27 LAB
ANION GAP SERPL CALC-SCNC: 10 MMOL/L — SIGNIFICANT CHANGE UP (ref 5–17)
BASOPHILS # BLD AUTO: 0.07 K/UL — SIGNIFICANT CHANGE UP (ref 0–0.2)
BASOPHILS NFR BLD AUTO: 0.6 % — SIGNIFICANT CHANGE UP (ref 0–2)
BUN SERPL-MCNC: 18 MG/DL — SIGNIFICANT CHANGE UP (ref 7–23)
CALCIUM SERPL-MCNC: 9.2 MG/DL — SIGNIFICANT CHANGE UP (ref 8.4–10.5)
CHLORIDE SERPL-SCNC: 99 MMOL/L — SIGNIFICANT CHANGE UP (ref 96–108)
CO2 SERPL-SCNC: 26 MMOL/L — SIGNIFICANT CHANGE UP (ref 22–31)
CREAT SERPL-MCNC: 0.85 MG/DL — SIGNIFICANT CHANGE UP (ref 0.5–1.3)
CULTURE RESULTS: SIGNIFICANT CHANGE UP
EGFR: 124 ML/MIN/1.73M2 — SIGNIFICANT CHANGE UP
EOSINOPHIL # BLD AUTO: 0.37 K/UL — SIGNIFICANT CHANGE UP (ref 0–0.5)
EOSINOPHIL NFR BLD AUTO: 3.4 % — SIGNIFICANT CHANGE UP (ref 0–6)
GLUCOSE SERPL-MCNC: 118 MG/DL — HIGH (ref 70–99)
HCT VFR BLD CALC: 45.1 % — SIGNIFICANT CHANGE UP (ref 39–50)
HGB BLD-MCNC: 15 G/DL — SIGNIFICANT CHANGE UP (ref 13–17)
IMM GRANULOCYTES NFR BLD AUTO: 1.6 % — HIGH (ref 0–0.9)
LYMPHOCYTES # BLD AUTO: 2.89 K/UL — SIGNIFICANT CHANGE UP (ref 1–3.3)
LYMPHOCYTES # BLD AUTO: 26.5 % — SIGNIFICANT CHANGE UP (ref 13–44)
MAGNESIUM SERPL-MCNC: 2.1 MG/DL — SIGNIFICANT CHANGE UP (ref 1.6–2.6)
MCHC RBC-ENTMCNC: 30.8 PG — SIGNIFICANT CHANGE UP (ref 27–34)
MCHC RBC-ENTMCNC: 33.3 GM/DL — SIGNIFICANT CHANGE UP (ref 32–36)
MCV RBC AUTO: 92.6 FL — SIGNIFICANT CHANGE UP (ref 80–100)
MONOCYTES # BLD AUTO: 0.84 K/UL — SIGNIFICANT CHANGE UP (ref 0–0.9)
MONOCYTES NFR BLD AUTO: 7.7 % — SIGNIFICANT CHANGE UP (ref 2–14)
NEUTROPHILS # BLD AUTO: 6.57 K/UL — SIGNIFICANT CHANGE UP (ref 1.8–7.4)
NEUTROPHILS NFR BLD AUTO: 60.2 % — SIGNIFICANT CHANGE UP (ref 43–77)
NRBC # BLD: 0 /100 WBCS — SIGNIFICANT CHANGE UP (ref 0–0)
PHOSPHATE SERPL-MCNC: 4.3 MG/DL — SIGNIFICANT CHANGE UP (ref 2.5–4.5)
PLATELET # BLD AUTO: 378 K/UL — SIGNIFICANT CHANGE UP (ref 150–400)
POTASSIUM SERPL-MCNC: 4.2 MMOL/L — SIGNIFICANT CHANGE UP (ref 3.5–5.3)
POTASSIUM SERPL-SCNC: 4.2 MMOL/L — SIGNIFICANT CHANGE UP (ref 3.5–5.3)
RBC # BLD: 4.87 M/UL — SIGNIFICANT CHANGE UP (ref 4.2–5.8)
RBC # FLD: 12.1 % — SIGNIFICANT CHANGE UP (ref 10.3–14.5)
SODIUM SERPL-SCNC: 135 MMOL/L — SIGNIFICANT CHANGE UP (ref 135–145)
SPECIMEN SOURCE: SIGNIFICANT CHANGE UP
VANCOMYCIN FLD-MCNC: 19.4 UG/ML — SIGNIFICANT CHANGE UP
VANCOMYCIN TROUGH SERPL-MCNC: 19.4 UG/ML — SIGNIFICANT CHANGE UP (ref 10–20)
VANCOMYCIN TROUGH SERPL-MCNC: 53.4 UG/ML — CRITICAL HIGH (ref 10–20)
WBC # BLD: 10.91 K/UL — HIGH (ref 3.8–10.5)
WBC # FLD AUTO: 10.91 K/UL — HIGH (ref 3.8–10.5)

## 2024-06-27 PROCEDURE — 99233 SBSQ HOSP IP/OBS HIGH 50: CPT | Mod: GC

## 2024-06-27 RX ORDER — VANCOMYCIN HYDROCHLORIDE 50 MG/ML
1500 KIT ORAL EVERY 8 HOURS
Refills: 0 | Status: COMPLETED | OUTPATIENT
Start: 2024-06-27 | End: 2024-06-28

## 2024-06-27 RX ORDER — OXYCODONE HYDROCHLORIDE 100 MG/5ML
2.5 SOLUTION ORAL EVERY 4 HOURS
Refills: 0 | Status: DISCONTINUED | OUTPATIENT
Start: 2024-06-27 | End: 2024-07-03

## 2024-06-27 RX ADMIN — Medication 400 MILLIGRAM(S): at 22:00

## 2024-06-27 RX ADMIN — VANCOMYCIN HYDROCHLORIDE 300 MILLIGRAM(S): KIT at 23:24

## 2024-06-27 RX ADMIN — VANCOMYCIN HYDROCHLORIDE 250 MILLIGRAM(S): KIT at 06:33

## 2024-06-27 RX ADMIN — Medication 30 MILLILITER(S): at 16:27

## 2024-06-27 RX ADMIN — PANTOPRAZOLE SODIUM 40 MILLIGRAM(S): 40 INJECTION, POWDER, FOR SOLUTION INTRAVENOUS at 06:34

## 2024-06-27 RX ADMIN — Medication 400 MILLIGRAM(S): at 21:04

## 2024-06-27 RX ADMIN — VANCOMYCIN HYDROCHLORIDE 300 MILLIGRAM(S): KIT at 16:16

## 2024-06-27 RX ADMIN — Medication 30 MILLILITER(S): at 21:04

## 2024-06-27 RX ADMIN — ENOXAPARIN SODIUM 40 MILLIGRAM(S): 100 INJECTION SUBCUTANEOUS at 14:41

## 2024-06-27 NOTE — PROGRESS NOTE ADULT - PROBLEM SELECTOR PLAN 1
sepsis POA (WBC, HR); BCx 6/20 growing MRSA in 2/2 bottles; source likely skin (wound above gluteal cleft, drainage cx growing MRSA; folliculitis of extremities); no e/o abscess on CT L-spine; HIV negative; WBC normalized on vanc; surveillance BCx 6/21 NGTD; no e/o IE on TTE, pretest probability not sufficiently high to warrant AUBREY  -- cont. vanc, dose per level; goal trough 13-18; plan for 4-week course (through 7/19), per ID recs  -- supportive care  -- contact precautions  -- f/u surveillance cultures

## 2024-06-27 NOTE — PROGRESS NOTE ADULT - PROBLEM SELECTOR PLAN 2
skin lesions initially c/f monkeypox, but now less likely given +MRSA bacteremia; Mpox PCR negative  -- appreciate ID recs, can d/c airborne precautions

## 2024-06-27 NOTE — PROGRESS NOTE ADULT - SUBJECTIVE AND OBJECTIVE BOX
OVERNIGHT EVENTS: NAEON    SUBJECTIVE / INTERVAL HPI: Patient seen and examined at bedside, doing well no complaints, understands that he is getting IV abx for 4 weeks.     VITAL SIGNS:  Vital Signs Last 24 Hrs  T(C): 37 (27 Jun 2024 05:43), Max: 37 (27 Jun 2024 05:43)  T(F): 98.6 (27 Jun 2024 05:43), Max: 98.6 (27 Jun 2024 05:43)  HR: 65 (27 Jun 2024 05:43) (65 - 67)  BP: 95/57 (27 Jun 2024 05:43) (95/57 - 120/71)  BP(mean): --  RR: 17 (27 Jun 2024 05:43) (17 - 17)  SpO2: 96% (27 Jun 2024 05:43) (96% - 99%)    Parameters below as of 27 Jun 2024 05:43  Patient On (Oxygen Delivery Method): room air      I&O's Summary      PHYSICAL EXAM:    GENERAL: no acute distress, lying in bed comfortably  HEAD:  Atraumatic, normocephalic  EYES: PERRLA, conjunctiva and sclera clear  ENT: Moist mucous membranes  NECK: Supple, no JVD  HEART: Regular rate and rhythm, no murmurs, rubs, or gallops  LUNGS: Unlabored respirations.  Clear to auscultation bilaterally, no crackles, wheezing, or rhonchi  ABDOMEN: Soft, nontender, nondistended, +BS  EXTREMITIES: No clubbing, cyanosis, or edema  NERVOUS SYSTEM:  A&Ox3, no focal deficits   SKIN: 3cm x 5cm area of ulceration with purulence and surrounding redness.     MEDICATIONS:  MEDICATIONS  (STANDING):  enoxaparin Injectable 40 milliGRAM(s) SubCutaneous every 24 hours  pantoprazole    Tablet 40 milliGRAM(s) Oral before breakfast    MEDICATIONS  (PRN):  acetaminophen     Tablet .. 650 milliGRAM(s) Oral every 6 hours PRN Mild Pain (1 - 3)  aluminum hydroxide/magnesium hydroxide/simethicone Suspension 30 milliLiter(s) Oral every 4 hours PRN Dyspepsia  ibuprofen  Tablet. 400 milliGRAM(s) Oral every 6 hours PRN Moderate Pain (4 - 6)  oxyCODONE    IR 2.5 milliGRAM(s) Oral every 4 hours PRN Severe Pain (7 - 10)      ALLERGIES:  Allergies    No Known Allergies    Intolerances        LABS:                        15.8   12.31 )-----------( 360      ( 26 Jun 2024 04:57 )             47.5     06-26    136  |  99  |  16  ----------------------------<  101<H>  4.9   |  27  |  0.82    Ca    9.6      26 Jun 2024 04:57  Phos  5.1     06-26  Mg     2.4     06-26        Urinalysis Basic - ( 26 Jun 2024 04:57 )    Color: x / Appearance: x / SG: x / pH: x  Gluc: 101 mg/dL / Ketone: x  / Bili: x / Urobili: x   Blood: x / Protein: x / Nitrite: x   Leuk Esterase: x / RBC: x / WBC x   Sq Epi: x / Non Sq Epi: x / Bacteria: x      CAPILLARY BLOOD GLUCOSE          RADIOLOGY & ADDITIONAL TESTS: Reviewed.

## 2024-06-27 NOTE — PROGRESS NOTE ADULT - SUBJECTIVE AND OBJECTIVE BOX
Patient is a 23y old  Male who presents with a chief complaint of WOUND CHECK     (24 Jun 2024 13:30)      INTERVAL HPI/OVERNIGHT EVENTS:    Pt. seen and examined earlier today  Pt. c/o chronic acid reflux, otherwise has no complaints  Pt. denies F/C, CP, SOB, pain    Review of Systems: 12 point review of systems otherwise negative    MEDICATIONS  (STANDING):  enoxaparin Injectable 40 milliGRAM(s) SubCutaneous every 24 hours  pantoprazole    Tablet 40 milliGRAM(s) Oral before breakfast  vancomycin  IVPB 1500 milliGRAM(s) IV Intermittent every 8 hours    MEDICATIONS  (PRN):  acetaminophen     Tablet .. 650 milliGRAM(s) Oral every 6 hours PRN Mild Pain (1 - 3)  aluminum hydroxide/magnesium hydroxide/simethicone Suspension 30 milliLiter(s) Oral every 4 hours PRN Dyspepsia  ibuprofen  Tablet. 400 milliGRAM(s) Oral every 6 hours PRN Moderate Pain (4 - 6)  oxyCODONE    IR 2.5 milliGRAM(s) Oral every 4 hours PRN Severe Pain (7 - 10)      Allergies    No Known Allergies    Intolerances          Vital Signs Last 24 Hrs  T(C): 37 (27 Jun 2024 05:43), Max: 37 (27 Jun 2024 05:43)  T(F): 98.6 (27 Jun 2024 05:43), Max: 98.6 (27 Jun 2024 05:43)  HR: 65 (27 Jun 2024 05:43) (65 - 67)  BP: 95/57 (27 Jun 2024 05:43) (95/57 - 120/71)  BP(mean): --  RR: 17 (27 Jun 2024 05:43) (17 - 17)  SpO2: 96% (27 Jun 2024 05:43) (96% - 98%)    Parameters below as of 27 Jun 2024 05:43  Patient On (Oxygen Delivery Method): room air      CAPILLARY BLOOD GLUCOSE            Physical Exam:  (earlier today)  Daily     Daily   General:  non-toxic appearing in NAD  HEENT:  MMM  lower back dressing C/D/I; no visible erythema  Neuro:  A&Ox3  rest of exam per PGY-1    LABS:                        15.0   10.91 )-----------( 378      ( 27 Jun 2024 05:30 )             45.1     06-27    135  |  99  |  18  ----------------------------<  118<H>  4.2   |  26  |  0.85    Ca    9.2      27 Jun 2024 05:30  Phos  4.3     06-27  Mg     2.1     06-27        Urinalysis Basic - ( 27 Jun 2024 05:30 )    Color: x / Appearance: x / SG: x / pH: x  Gluc: 118 mg/dL / Ketone: x  / Bili: x / Urobili: x   Blood: x / Protein: x / Nitrite: x   Leuk Esterase: x / RBC: x / WBC x   Sq Epi: x / Non Sq Epi: x / Bacteria: x

## 2024-06-28 LAB
ANION GAP SERPL CALC-SCNC: 10 MMOL/L — SIGNIFICANT CHANGE UP (ref 5–17)
BASOPHILS # BLD AUTO: 0.07 K/UL — SIGNIFICANT CHANGE UP (ref 0–0.2)
BASOPHILS NFR BLD AUTO: 0.6 % — SIGNIFICANT CHANGE UP (ref 0–2)
BUN SERPL-MCNC: 17 MG/DL — SIGNIFICANT CHANGE UP (ref 7–23)
CALCIUM SERPL-MCNC: 9 MG/DL — SIGNIFICANT CHANGE UP (ref 8.4–10.5)
CHLORIDE SERPL-SCNC: 101 MMOL/L — SIGNIFICANT CHANGE UP (ref 96–108)
CO2 SERPL-SCNC: 25 MMOL/L — SIGNIFICANT CHANGE UP (ref 22–31)
CREAT SERPL-MCNC: 0.78 MG/DL — SIGNIFICANT CHANGE UP (ref 0.5–1.3)
EGFR: 127 ML/MIN/1.73M2 — SIGNIFICANT CHANGE UP
EOSINOPHIL # BLD AUTO: 0.38 K/UL — SIGNIFICANT CHANGE UP (ref 0–0.5)
EOSINOPHIL NFR BLD AUTO: 3.1 % — SIGNIFICANT CHANGE UP (ref 0–6)
GLUCOSE SERPL-MCNC: 109 MG/DL — HIGH (ref 70–99)
HCT VFR BLD CALC: 45.2 % — SIGNIFICANT CHANGE UP (ref 39–50)
HGB BLD-MCNC: 15 G/DL — SIGNIFICANT CHANGE UP (ref 13–17)
IMM GRANULOCYTES NFR BLD AUTO: 1.6 % — HIGH (ref 0–0.9)
LYMPHOCYTES # BLD AUTO: 26.5 % — SIGNIFICANT CHANGE UP (ref 13–44)
LYMPHOCYTES # BLD AUTO: 3.29 K/UL — SIGNIFICANT CHANGE UP (ref 1–3.3)
MAGNESIUM SERPL-MCNC: 2.3 MG/DL — SIGNIFICANT CHANGE UP (ref 1.6–2.6)
MCHC RBC-ENTMCNC: 30.7 PG — SIGNIFICANT CHANGE UP (ref 27–34)
MCHC RBC-ENTMCNC: 33.2 GM/DL — SIGNIFICANT CHANGE UP (ref 32–36)
MCV RBC AUTO: 92.6 FL — SIGNIFICANT CHANGE UP (ref 80–100)
MONOCYTES # BLD AUTO: 1.02 K/UL — HIGH (ref 0–0.9)
MONOCYTES NFR BLD AUTO: 8.2 % — SIGNIFICANT CHANGE UP (ref 2–14)
NEUTROPHILS # BLD AUTO: 7.47 K/UL — HIGH (ref 1.8–7.4)
NEUTROPHILS NFR BLD AUTO: 60 % — SIGNIFICANT CHANGE UP (ref 43–77)
NRBC # BLD: 0 /100 WBCS — SIGNIFICANT CHANGE UP (ref 0–0)
PHOSPHATE SERPL-MCNC: 4.9 MG/DL — HIGH (ref 2.5–4.5)
PLATELET # BLD AUTO: 415 K/UL — HIGH (ref 150–400)
POTASSIUM SERPL-MCNC: 4.3 MMOL/L — SIGNIFICANT CHANGE UP (ref 3.5–5.3)
POTASSIUM SERPL-SCNC: 4.3 MMOL/L — SIGNIFICANT CHANGE UP (ref 3.5–5.3)
RBC # BLD: 4.88 M/UL — SIGNIFICANT CHANGE UP (ref 4.2–5.8)
RBC # FLD: 11.9 % — SIGNIFICANT CHANGE UP (ref 10.3–14.5)
SODIUM SERPL-SCNC: 136 MMOL/L — SIGNIFICANT CHANGE UP (ref 135–145)
VANCOMYCIN TROUGH SERPL-MCNC: 24.6 UG/ML — HIGH (ref 10–20)
VANCOMYCIN TROUGH SERPL-MCNC: 7.8 UG/ML — LOW (ref 10–20)
WBC # BLD: 12.43 K/UL — HIGH (ref 3.8–10.5)
WBC # FLD AUTO: 12.43 K/UL — HIGH (ref 3.8–10.5)

## 2024-06-28 PROCEDURE — 99233 SBSQ HOSP IP/OBS HIGH 50: CPT | Mod: GC

## 2024-06-28 RX ORDER — VANCOMYCIN HYDROCHLORIDE 50 MG/ML
1250 KIT ORAL EVERY 8 HOURS
Refills: 0 | Status: DISCONTINUED | OUTPATIENT
Start: 2024-06-28 | End: 2024-06-29

## 2024-06-28 RX ADMIN — VANCOMYCIN HYDROCHLORIDE 166.67 MILLIGRAM(S): KIT at 23:20

## 2024-06-28 RX ADMIN — ENOXAPARIN SODIUM 40 MILLIGRAM(S): 100 INJECTION SUBCUTANEOUS at 13:29

## 2024-06-28 RX ADMIN — Medication 400 MILLIGRAM(S): at 13:29

## 2024-06-28 RX ADMIN — Medication 400 MILLIGRAM(S): at 14:29

## 2024-06-28 RX ADMIN — Medication 650 MILLIGRAM(S): at 10:09

## 2024-06-28 RX ADMIN — VANCOMYCIN HYDROCHLORIDE 300 MILLIGRAM(S): KIT at 06:20

## 2024-06-28 RX ADMIN — Medication 400 MILLIGRAM(S): at 21:51

## 2024-06-28 RX ADMIN — Medication 400 MILLIGRAM(S): at 22:51

## 2024-06-28 RX ADMIN — Medication 650 MILLIGRAM(S): at 09:09

## 2024-06-28 RX ADMIN — Medication 30 MILLILITER(S): at 09:10

## 2024-06-28 RX ADMIN — PANTOPRAZOLE SODIUM 40 MILLIGRAM(S): 40 INJECTION, POWDER, FOR SOLUTION INTRAVENOUS at 06:17

## 2024-06-28 RX ADMIN — Medication 30 MILLILITER(S): at 21:51

## 2024-06-28 NOTE — PROGRESS NOTE ADULT - SUBJECTIVE AND OBJECTIVE BOX
**INCOMPLETE NOTE    INTERVAL/OVERNIGHT EVENTS: None    SUBJECTIVE:  Patient seen and examined at bedside, comfortable, NAD. Denied fever, chest pain, dyspnea, abdominal pain.     Vital Signs Last 12 Hrs  T(F): 98.3 (06-28-24 @ 05:42), Max: 98.3 (06-28-24 @ 05:42)  HR: 61 (06-28-24 @ 05:42) (61 - 73)  BP: 119/71 (06-28-24 @ 05:42) (119/71 - 126/55)  BP(mean): --  RR: 17 (06-28-24 @ 05:42) (17 - 17)  SpO2: 96% (06-28-24 @ 05:42) (96% - 97%)  I&O's Summary      PHYSICAL EXAM:  General: NAD  HEENT: PERRL, EOM intact, sclera anicteric, MMM  Cardiovascular: RRR; no MRG; no JVD  Respiratory: CTAB; no WRR  GI/: soft; NTND; BS x4  Extremities: WWP; 2+ peripheral pulses bilaterally; no LE edema  Skin: normal color & turgor; no rash  Neurologic: aox3; no focal deficits    LABS:                        15.0   12.43 )-----------( 415      ( 28 Jun 2024 06:14 )             45.2     06-28    136  |  101  |  17  ----------------------------<  109<H>  4.3   |  25  |  0.78    Ca    9.0      28 Jun 2024 06:14  Phos  4.9     06-28  Mg     2.3     06-28        Urinalysis Basic - ( 28 Jun 2024 06:14 )    Color: x / Appearance: x / SG: x / pH: x  Gluc: 109 mg/dL / Ketone: x  / Bili: x / Urobili: x   Blood: x / Protein: x / Nitrite: x   Leuk Esterase: x / RBC: x / WBC x   Sq Epi: x / Non Sq Epi: x / Bacteria: x          RADIOLOGY & ADDITIONAL TESTS:    MEDICATIONS  (STANDING):  enoxaparin Injectable 40 milliGRAM(s) SubCutaneous every 24 hours  pantoprazole    Tablet 40 milliGRAM(s) Oral before breakfast    MEDICATIONS  (PRN):  acetaminophen     Tablet .. 650 milliGRAM(s) Oral every 6 hours PRN Mild Pain (1 - 3)  aluminum hydroxide/magnesium hydroxide/simethicone Suspension 30 milliLiter(s) Oral every 4 hours PRN Dyspepsia  ibuprofen  Tablet. 400 milliGRAM(s) Oral every 6 hours PRN Moderate Pain (4 - 6)  oxyCODONE    IR 2.5 milliGRAM(s) Oral every 4 hours PRN Severe Pain (7 - 10)   INTERVAL/OVERNIGHT EVENTS: None    SUBJECTIVE:  Patient seen and examined at bedside, comfortable lying on his back, in NAD. Denied fever, cough, dyspnea, chest pain, and abdominal pain.     Vital Signs Last 12 Hrs  T(F): 98.3 (06-28-24 @ 05:42), Max: 98.3 (06-28-24 @ 05:42)  HR: 61 (06-28-24 @ 05:42) (61 - 73)  BP: 119/71 (06-28-24 @ 05:42) (119/71 - 126/55)  BP(mean): --  RR: 17 (06-28-24 @ 05:42) (17 - 17)  SpO2: 96% (06-28-24 @ 05:42) (96% - 97%)  I&O's Summary      PHYSICAL EXAM:  General: NAD  HEENT: PERRL, EOM intact, sclera anicteric, MMM  Cardiovascular: RRR; no MRG; no JVD  Respiratory: CTAB; no WRR  GI/: soft; NTND; BS x4  Extremities: WWP; 2+ peripheral pulses bilaterally; no LE edema  Back: ulcer on lower back, non-draining  Skin: normal color & turgor; no rash  Neurologic: aox3; no focal deficits    LABS:                        15.0   12.43 )-----------( 415      ( 28 Jun 2024 06:14 )             45.2     06-28    136  |  101  |  17  ----------------------------<  109<H>  4.3   |  25  |  0.78    Ca    9.0      28 Jun 2024 06:14  Phos  4.9     06-28  Mg     2.3     06-28        Urinalysis Basic - ( 28 Jun 2024 06:14 )    Color: x / Appearance: x / SG: x / pH: x  Gluc: 109 mg/dL / Ketone: x  / Bili: x / Urobili: x   Blood: x / Protein: x / Nitrite: x   Leuk Esterase: x / RBC: x / WBC x   Sq Epi: x / Non Sq Epi: x / Bacteria: x          RADIOLOGY & ADDITIONAL TESTS:    MEDICATIONS  (STANDING):  enoxaparin Injectable 40 milliGRAM(s) SubCutaneous every 24 hours  pantoprazole    Tablet 40 milliGRAM(s) Oral before breakfast    MEDICATIONS  (PRN):  acetaminophen     Tablet .. 650 milliGRAM(s) Oral every 6 hours PRN Mild Pain (1 - 3)  aluminum hydroxide/magnesium hydroxide/simethicone Suspension 30 milliLiter(s) Oral every 4 hours PRN Dyspepsia  ibuprofen  Tablet. 400 milliGRAM(s) Oral every 6 hours PRN Moderate Pain (4 - 6)  oxyCODONE    IR 2.5 milliGRAM(s) Oral every 4 hours PRN Severe Pain (7 - 10)   INTERVAL/OVERNIGHT EVENTS: None    SUBJECTIVE:  Patient seen and examined at bedside, comfortable lying on his back, in NAD. Denied fever, cough, dyspnea, chest pain, and abdominal pain. Does admit to occasional popping sensation in the left knee with no accompanying swelling or pain.    Vital Signs Last 12 Hrs  T(F): 98.3 (06-28-24 @ 05:42), Max: 98.3 (06-28-24 @ 05:42)  HR: 61 (06-28-24 @ 05:42) (61 - 73)  BP: 119/71 (06-28-24 @ 05:42) (119/71 - 126/55)  BP(mean): --  RR: 17 (06-28-24 @ 05:42) (17 - 17)  SpO2: 96% (06-28-24 @ 05:42) (96% - 97%)  I&O's Summary      PHYSICAL EXAM:  General: NAD  HEENT: PERRL, EOM intact, sclera anicteric, MMM  Cardiovascular: RRR; no MRG; no JVD  Respiratory: CTAB; no WRR  GI/: soft; NTND; BS x4  Extremities: WWP; 2+ peripheral pulses bilaterally; no LE edema  Back: ulcer on lower back, non-draining  Skin: normal color & turgor; no rash  Neurologic: aox3; no focal deficits    LABS:                        15.0   12.43 )-----------( 415      ( 28 Jun 2024 06:14 )             45.2     06-28    136  |  101  |  17  ----------------------------<  109<H>  4.3   |  25  |  0.78    Ca    9.0      28 Jun 2024 06:14  Phos  4.9     06-28  Mg     2.3     06-28        Urinalysis Basic - ( 28 Jun 2024 06:14 )    Color: x / Appearance: x / SG: x / pH: x  Gluc: 109 mg/dL / Ketone: x  / Bili: x / Urobili: x   Blood: x / Protein: x / Nitrite: x   Leuk Esterase: x / RBC: x / WBC x   Sq Epi: x / Non Sq Epi: x / Bacteria: x          RADIOLOGY & ADDITIONAL TESTS:    MEDICATIONS  (STANDING):  enoxaparin Injectable 40 milliGRAM(s) SubCutaneous every 24 hours  pantoprazole    Tablet 40 milliGRAM(s) Oral before breakfast    MEDICATIONS  (PRN):  acetaminophen     Tablet .. 650 milliGRAM(s) Oral every 6 hours PRN Mild Pain (1 - 3)  aluminum hydroxide/magnesium hydroxide/simethicone Suspension 30 milliLiter(s) Oral every 4 hours PRN Dyspepsia  ibuprofen  Tablet. 400 milliGRAM(s) Oral every 6 hours PRN Moderate Pain (4 - 6)  oxyCODONE    IR 2.5 milliGRAM(s) Oral every 4 hours PRN Severe Pain (7 - 10)

## 2024-06-28 NOTE — PROGRESS NOTE ADULT - ASSESSMENT
22 y/o M w/ Consent (Temporal Branch)/Introductory Paragraph: The rationale for Mohs was explained to the patient and consent was obtained. The risks, benefits and alternatives to therapy were discussed in detail. Specifically, the risks of damage to the temporal branch of the facial nerve, infection, scarring, bleeding, prolonged wound healing, incomplete removal, allergy to anesthesia, and recurrence were addressed. Prior to the procedure, the treatment site was clearly identified and confirmed by the patient using a hand mirror. All components of Universal Protocol/PAUSE Rule completed.

## 2024-06-28 NOTE — PROGRESS NOTE ADULT - REASON FOR ADMISSION
Painful ulcers with purulent discharge, concern for monkeypox Painful ulcers with purulent discharge, initial concern for monkeypox

## 2024-06-28 NOTE — PROGRESS NOTE ADULT - SUBJECTIVE AND OBJECTIVE BOX
Patient is a 23y old  Male who presents with a chief complaint of Painful ulcers with purulent discharge, concern for monkeypox (28 Jun 2024 06:45)      INTERVAL HPI/OVERNIGHT EVENTS:    Pt. seen and examined earlier today  Pt. c/o occasional L knee pain w/ "popping" sound, chronic; Pt. denies knee swelling, warmth, or redness  Pt. otherwise feels well    Review of Systems: 12 point review of systems otherwise negative    MEDICATIONS  (STANDING):  enoxaparin Injectable 40 milliGRAM(s) SubCutaneous every 24 hours  pantoprazole    Tablet 40 milliGRAM(s) Oral before breakfast    MEDICATIONS  (PRN):  acetaminophen     Tablet .. 650 milliGRAM(s) Oral every 6 hours PRN Mild Pain (1 - 3)  aluminum hydroxide/magnesium hydroxide/simethicone Suspension 30 milliLiter(s) Oral every 4 hours PRN Dyspepsia  ibuprofen  Tablet. 400 milliGRAM(s) Oral every 6 hours PRN Moderate Pain (4 - 6)  oxyCODONE    IR 2.5 milliGRAM(s) Oral every 4 hours PRN Severe Pain (7 - 10)      Allergies    No Known Allergies    Intolerances          Vital Signs Last 24 Hrs  T(C): 36.2 (28 Jun 2024 09:00), Max: 36.8 (28 Jun 2024 05:42)  T(F): 97.2 (28 Jun 2024 09:00), Max: 98.3 (28 Jun 2024 05:42)  HR: 72 (28 Jun 2024 09:00) (61 - 73)  BP: 112/67 (28 Jun 2024 09:00) (112/67 - 126/55)  BP(mean): --  RR: 18 (28 Jun 2024 09:00) (17 - 18)  SpO2: 97% (28 Jun 2024 09:00) (96% - 97%)    Parameters below as of 28 Jun 2024 09:00  Patient On (Oxygen Delivery Method): room air      CAPILLARY BLOOD GLUCOSE            Physical Exam:  (earlier today)  Daily     Daily   General:  non-toxic appearing in NAD  HEENT:  MMM  lower back dressing C/D/I; ~3x3cm full-thickness wound above gluteal cleft, less than 0.5cm depth, TTP, no fluctuance, no surrounding erythema  Extremities:  resolving scattered areas of folliculitis B/L LE  R knee unremarkable, no erythema, effusion, or warmth  Neuro:  A&Ox3  rest of exam per PGY-1    LABS:                        15.0   12.43 )-----------( 415      ( 28 Jun 2024 06:14 )             45.2     06-28    136  |  101  |  17  ----------------------------<  109<H>  4.3   |  25  |  0.78    Ca    9.0      28 Jun 2024 06:14  Phos  4.9     06-28  Mg     2.3     06-28        Urinalysis Basic - ( 28 Jun 2024 06:14 )    Color: x / Appearance: x / SG: x / pH: x  Gluc: 109 mg/dL / Ketone: x  / Bili: x / Urobili: x   Blood: x / Protein: x / Nitrite: x   Leuk Esterase: x / RBC: x / WBC x   Sq Epi: x / Non Sq Epi: x / Bacteria: x

## 2024-06-28 NOTE — PROGRESS NOTE ADULT - NSPROGADDITIONALINFOA_GEN_ALL_CORE
DVT ppx: LMWH  Dispo: needs to complete a 4-week course of IV vanc; however, Pt. is houseless and uninsured, will likely finish course at [facility]
DVT ppx: LMWH  Dispo: will likely need to complete a 4-week course of IV vanc; however, Pt. is houseless and uninsured
DVT ppx: LMWH  Dispo: needs to complete a 4-week course of IV vanc; however, Pt. is houseless and uninsured, will likely finish course at [facility]
DVT ppx: LMWH  Dispo: pending cx data, ID recs
DVT ppx: LMWH  Dispo: needs to complete a 4-week course of IV vanc; however, Pt. is houseless and uninsured, will likely finish course at [facility]
DVT ppx: LMWH  Dispo: needs to complete a 4-week course of IV vanc; however, Pt. is houseless and uninsured, will likely finish course at [facility]

## 2024-06-28 NOTE — PROGRESS NOTE ADULT - ASSESSMENT
24 y/o M w/ 22 y/o M w/ substance use disorder who presented with painful ulceration and purulent discharge of mid lower back, and diffuse maculopapular lesions concerning for possible Monkeypox. 24 y/o M w/ substance use disorder who presented with complaints of painful ulceration with purulent drainage of mid lower back, diffuse maculopapular lesions concerning for possible Monkeypox.

## 2024-06-28 NOTE — PROGRESS NOTE ADULT - PROBLEM SELECTOR PLAN 5
repleted, now slightly elevated, unclear significance  -- monitor phos repleted, now slightly elevated, unclear significance  --4.9 as of 6/28  -- monitor phos repleted, now slightly elevated, unclear significance  --4.9 as of 6/28  -- monitor phosphate levels

## 2024-06-28 NOTE — PROGRESS NOTE ADULT - PROBLEM SELECTOR PLAN 2
skin lesions initially c/f monkeypox, but now less likely given +MRSA bacteremia  -- cont. airborne precautions for now  -- f/u Mpox PCR  -- appreciate ID recs skin lesions initially c/f monkeypox, but now less likely given +MRSA bacteremia  -- cont. airborne precautions for now  -- Mpox PCR negative, ruled out monkeypox  -- appreciate ID recs skin lesions initially c/f monkeypox, but now less likely given +MRSA bacteremia  -- cont. airborne precautions for now  -- Mpox PCR negative   -- appreciate ID recs skin lesions initially c/f monkeypox, but now less likely given +MRSA bacteremia  -- cont. airborne precautions for now  -- Mpox PCR negative   -- appreciate ID recommendations

## 2024-06-28 NOTE — PROGRESS NOTE ADULT - PROBLEM SELECTOR PLAN 1
sepsis POA (WBC, HR); BCx 6/20 growing MRSA in 2/2 bottles; source likely skin (wound above gluteal cleft, drainage cx growing MRSA; folliculitis of extremities); no e/o abscess on CT L-spine; HIV negative; WBC normalized on vanc; surveillance BCx 6/21 NGTD; no e/o IE on TTE, pretest probability not sufficiently high to warrant AUBREY  -- cont. vanc, dose per level; goal trough 13-18; anticipate 4-week course (through 7/19), per ID recs  -- supportive care  -- f/u surveillance cultures sepsis POA (WBC, HR); BCx 6/20 growing MRSA in 2/2 bottles; source likely skin (wound above gluteal cleft, drainage cx growing MRSA; folliculitis of extremities); no e/o abscess on CT L-spine; HIV negative; WBC normalized on vancomycin; surveillance BCx 6/21 NGTD; no e/o IE on TTE, pretest probability not sufficiently high to warrant AUBREY. WBC as of 6/28 AM is 12.43  -- cont. vancomycin, dose per level; goal trough 13-18; anticipate 4-week course (through 7/19), per ID recommendation  -- supportive care  -- f/u surveillance cultures

## 2024-06-29 LAB
ANION GAP SERPL CALC-SCNC: 10 MMOL/L — SIGNIFICANT CHANGE UP (ref 5–17)
BASOPHILS # BLD AUTO: 0.04 K/UL — SIGNIFICANT CHANGE UP (ref 0–0.2)
BASOPHILS NFR BLD AUTO: 0.4 % — SIGNIFICANT CHANGE UP (ref 0–2)
BUN SERPL-MCNC: 18 MG/DL — SIGNIFICANT CHANGE UP (ref 7–23)
CALCIUM SERPL-MCNC: 9.4 MG/DL — SIGNIFICANT CHANGE UP (ref 8.4–10.5)
CHLORIDE SERPL-SCNC: 102 MMOL/L — SIGNIFICANT CHANGE UP (ref 96–108)
CO2 SERPL-SCNC: 26 MMOL/L — SIGNIFICANT CHANGE UP (ref 22–31)
CREAT SERPL-MCNC: 0.82 MG/DL — SIGNIFICANT CHANGE UP (ref 0.5–1.3)
EGFR: 125 ML/MIN/1.73M2 — SIGNIFICANT CHANGE UP
EOSINOPHIL # BLD AUTO: 0.37 K/UL — SIGNIFICANT CHANGE UP (ref 0–0.5)
EOSINOPHIL NFR BLD AUTO: 3.5 % — SIGNIFICANT CHANGE UP (ref 0–6)
GLUCOSE SERPL-MCNC: 103 MG/DL — HIGH (ref 70–99)
HCT VFR BLD CALC: 43.7 % — SIGNIFICANT CHANGE UP (ref 39–50)
HGB BLD-MCNC: 14.6 G/DL — SIGNIFICANT CHANGE UP (ref 13–17)
IMM GRANULOCYTES NFR BLD AUTO: 1.4 % — HIGH (ref 0–0.9)
LYMPHOCYTES # BLD AUTO: 2.84 K/UL — SIGNIFICANT CHANGE UP (ref 1–3.3)
LYMPHOCYTES # BLD AUTO: 26.6 % — SIGNIFICANT CHANGE UP (ref 13–44)
MAGNESIUM SERPL-MCNC: 2.2 MG/DL — SIGNIFICANT CHANGE UP (ref 1.6–2.6)
MCHC RBC-ENTMCNC: 30.9 PG — SIGNIFICANT CHANGE UP (ref 27–34)
MCHC RBC-ENTMCNC: 33.4 GM/DL — SIGNIFICANT CHANGE UP (ref 32–36)
MCV RBC AUTO: 92.4 FL — SIGNIFICANT CHANGE UP (ref 80–100)
MONOCYTES # BLD AUTO: 0.8 K/UL — SIGNIFICANT CHANGE UP (ref 0–0.9)
MONOCYTES NFR BLD AUTO: 7.5 % — SIGNIFICANT CHANGE UP (ref 2–14)
NEUTROPHILS # BLD AUTO: 6.48 K/UL — SIGNIFICANT CHANGE UP (ref 1.8–7.4)
NEUTROPHILS NFR BLD AUTO: 60.6 % — SIGNIFICANT CHANGE UP (ref 43–77)
NRBC # BLD: 0 /100 WBCS — SIGNIFICANT CHANGE UP (ref 0–0)
PHOSPHATE SERPL-MCNC: 4.5 MG/DL — SIGNIFICANT CHANGE UP (ref 2.5–4.5)
PLATELET # BLD AUTO: 417 K/UL — HIGH (ref 150–400)
POTASSIUM SERPL-MCNC: 4.1 MMOL/L — SIGNIFICANT CHANGE UP (ref 3.5–5.3)
POTASSIUM SERPL-SCNC: 4.1 MMOL/L — SIGNIFICANT CHANGE UP (ref 3.5–5.3)
RBC # BLD: 4.73 M/UL — SIGNIFICANT CHANGE UP (ref 4.2–5.8)
RBC # FLD: 12 % — SIGNIFICANT CHANGE UP (ref 10.3–14.5)
SODIUM SERPL-SCNC: 138 MMOL/L — SIGNIFICANT CHANGE UP (ref 135–145)
VANCOMYCIN TROUGH SERPL-MCNC: 12.8 UG/ML — SIGNIFICANT CHANGE UP (ref 10–20)
WBC # BLD: 10.68 K/UL — HIGH (ref 3.8–10.5)
WBC # FLD AUTO: 10.68 K/UL — HIGH (ref 3.8–10.5)

## 2024-06-29 PROCEDURE — 99232 SBSQ HOSP IP/OBS MODERATE 35: CPT

## 2024-06-29 RX ORDER — NICOTINE POLACRILEX 2 MG/1
1 LOZENGE ORAL DAILY
Refills: 0 | Status: DISCONTINUED | OUTPATIENT
Start: 2024-06-29 | End: 2024-07-19

## 2024-06-29 RX ORDER — VANCOMYCIN HYDROCHLORIDE 50 MG/ML
1500 KIT ORAL ONCE
Refills: 0 | Status: COMPLETED | OUTPATIENT
Start: 2024-06-29 | End: 2024-06-29

## 2024-06-29 RX ADMIN — NICOTINE POLACRILEX 1 PATCH: 2 LOZENGE ORAL at 14:34

## 2024-06-29 RX ADMIN — NICOTINE POLACRILEX 1 PATCH: 2 LOZENGE ORAL at 19:14

## 2024-06-29 RX ADMIN — PANTOPRAZOLE SODIUM 40 MILLIGRAM(S): 40 INJECTION, POWDER, FOR SOLUTION INTRAVENOUS at 06:18

## 2024-06-29 RX ADMIN — VANCOMYCIN HYDROCHLORIDE 300 MILLIGRAM(S): KIT at 15:09

## 2024-06-29 RX ADMIN — ENOXAPARIN SODIUM 40 MILLIGRAM(S): 100 INJECTION SUBCUTANEOUS at 14:34

## 2024-06-29 RX ADMIN — VANCOMYCIN HYDROCHLORIDE 166.67 MILLIGRAM(S): KIT at 06:18

## 2024-06-29 NOTE — PROGRESS NOTE ADULT - SUBJECTIVE AND OBJECTIVE BOX
INTERVAL EVENTS: No o/n events. Denies CP, dyspnea, palpitations, presyncope, syncope, f/c/n/v.     REVIEW OF SYSTEMS:  Constitutional:     [X] negative [ ] fevers [ ] chills [ ] weight loss [ ] weight gain  HEENT:                  [X] negative [ ] dry eyes [ ] eye irritation [ ] postnasal drip [ ] nasal congestion  CV:                         [X] negative  [ ] chest pain [ ] orthopnea [ ] palpitations [ ] murmur  Resp:                     [X] negative [ ] cough [ ] shortness of breath [ ] wheezing [ ] sputum [ ] hemoptysis  GI:                          [X] negative [ ] nausea [ ] vomiting [ ] diarrhea [ ] constipation [ ] abd pain [ ] dysphagia   :                        [X] negative [ ] dysuria [ ] nocturia [ ] hematuria [ ] increased urinary frequency  MSK:                      [X] negative [ ] back pain [ ] myalgias [ ] arthralgias [ ] fracture  Skin:                       [X] negative [ ] rash [ ] itch  Neuro:                   [X] negative [ ] headache [ ] dizziness [ ] syncope [ ] weakness [ ] numbness  Psych:                    [X] negative [ ] anxiety [ ] depression  Endo:                     [X] negative [ ] diabetes [ ] thyroid problem  Heme/Lymph:      [X] negative [ ] anemia [ ] bleeding problem  Allergic/Immune: [X] negative [ ] itchy eyes [ ] nasal discharge [ ] hives [ ] angioedema    [X] All other systems negative or otherwise described above.  [ ] Unable to assess ROS due to ________.    PAST MEDICAL & SURGICAL HISTORY:    MEDICATIONS  (STANDING):  enoxaparin Injectable 40 milliGRAM(s) SubCutaneous every 24 hours  nicotine -   7 mG/24Hr(s) Patch 1 Patch Transdermal daily  pantoprazole    Tablet 40 milliGRAM(s) Oral before breakfast  vancomycin  IVPB 1250 milliGRAM(s) IV Intermittent every 8 hours    MEDICATIONS  (PRN):  acetaminophen     Tablet .. 650 milliGRAM(s) Oral every 6 hours PRN Mild Pain (1 - 3)  aluminum hydroxide/magnesium hydroxide/simethicone Suspension 30 milliLiter(s) Oral every 4 hours PRN Dyspepsia  ibuprofen  Tablet. 400 milliGRAM(s) Oral every 6 hours PRN Moderate Pain (4 - 6)  oxyCODONE    IR 2.5 milliGRAM(s) Oral every 4 hours PRN Severe Pain (7 - 10)    ICU Vital Signs Last 24 Hrs  T(C): 36.4 (29 Jun 2024 05:31), Max: 36.9 (28 Jun 2024 15:56)  T(F): 97.6 (29 Jun 2024 05:31), Max: 98.4 (28 Jun 2024 15:56)  HR: 61 (29 Jun 2024 05:31) (61 - 84)  BP: 106/59 (29 Jun 2024 05:31) (106/59 - 124/64)  BP(mean): 76 (28 Jun 2024 21:17) (76 - 76)  ABP: --  ABP(mean): --  RR: 18 (29 Jun 2024 05:31) (18 - 18)  SpO2: 97% (29 Jun 2024 05:31) (97% - 98%)    O2 Parameters below as of 29 Jun 2024 05:31  Patient On (Oxygen Delivery Method): room air          Orthostatic VS    Daily     Daily   I&O's Summary      PHYSICAL EXAM:  General: NAD  HEENT: PERRL, EOM intact, sclera anicteric, MMM  Cardiovascular: RRR; no MRG; no JVD  Respiratory: CTAB; no WRR  GI/: soft; NTND; BS x4  Extremities: WWP; 2+ peripheral pulses bilaterally; no LE edema  Back: ulcer on lower back, non-draining  Skin: normal color & turgor; no rash  Neurologic: aox3; no focal deficits    LABS:                        14.6   10.68 )-----------( 417      ( 29 Jun 2024 07:58 )             43.7       06-29    138  |  102  |  18  ----------------------------<  103<H>  4.1   |  26  |  0.82    Ca    9.4      29 Jun 2024 07:58  Phos  4.5     06-29  Mg     2.2     06-29        Urinalysis Basic - ( 29 Jun 2024 07:58 )    Color: x / Appearance: x / SG: x / pH: x  Gluc: 103 mg/dL / Ketone: x  / Bili: x / Urobili: x   Blood: x / Protein: x / Nitrite: x   Leuk Esterase: x / RBC: x / WBC x   Sq Epi: x / Non Sq Epi: x / Bacteria: x          RADIOLOGY & ADDITIONAL STUDIES: Reviewed

## 2024-06-30 LAB
ALBUMIN SERPL ELPH-MCNC: 3.8 G/DL — SIGNIFICANT CHANGE UP (ref 3.3–5)
ALP SERPL-CCNC: 67 U/L — SIGNIFICANT CHANGE UP (ref 40–120)
ALT FLD-CCNC: 37 U/L — SIGNIFICANT CHANGE UP (ref 10–45)
ANION GAP SERPL CALC-SCNC: 10 MMOL/L — SIGNIFICANT CHANGE UP (ref 5–17)
AST SERPL-CCNC: 18 U/L — SIGNIFICANT CHANGE UP (ref 10–40)
BILIRUB SERPL-MCNC: 0.2 MG/DL — SIGNIFICANT CHANGE UP (ref 0.2–1.2)
BUN SERPL-MCNC: 17 MG/DL — SIGNIFICANT CHANGE UP (ref 7–23)
CALCIUM SERPL-MCNC: 9.3 MG/DL — SIGNIFICANT CHANGE UP (ref 8.4–10.5)
CHLORIDE SERPL-SCNC: 101 MMOL/L — SIGNIFICANT CHANGE UP (ref 96–108)
CO2 SERPL-SCNC: 25 MMOL/L — SIGNIFICANT CHANGE UP (ref 22–31)
CREAT SERPL-MCNC: 0.89 MG/DL — SIGNIFICANT CHANGE UP (ref 0.5–1.3)
EGFR: 122 ML/MIN/1.73M2 — SIGNIFICANT CHANGE UP
GLUCOSE SERPL-MCNC: 103 MG/DL — HIGH (ref 70–99)
HCT VFR BLD CALC: 41.9 % — SIGNIFICANT CHANGE UP (ref 39–50)
HGB BLD-MCNC: 14.6 G/DL — SIGNIFICANT CHANGE UP (ref 13–17)
MAGNESIUM SERPL-MCNC: 2.3 MG/DL — SIGNIFICANT CHANGE UP (ref 1.6–2.6)
MCHC RBC-ENTMCNC: 31.5 PG — SIGNIFICANT CHANGE UP (ref 27–34)
MCHC RBC-ENTMCNC: 34.8 GM/DL — SIGNIFICANT CHANGE UP (ref 32–36)
MCV RBC AUTO: 90.3 FL — SIGNIFICANT CHANGE UP (ref 80–100)
NRBC # BLD: 0 /100 WBCS — SIGNIFICANT CHANGE UP (ref 0–0)
PHOSPHATE SERPL-MCNC: 5.1 MG/DL — HIGH (ref 2.5–4.5)
PLATELET # BLD AUTO: 386 K/UL — SIGNIFICANT CHANGE UP (ref 150–400)
POTASSIUM SERPL-MCNC: 3.8 MMOL/L — SIGNIFICANT CHANGE UP (ref 3.5–5.3)
POTASSIUM SERPL-SCNC: 3.8 MMOL/L — SIGNIFICANT CHANGE UP (ref 3.5–5.3)
PROT SERPL-MCNC: 7.4 G/DL — SIGNIFICANT CHANGE UP (ref 6–8.3)
RBC # BLD: 4.64 M/UL — SIGNIFICANT CHANGE UP (ref 4.2–5.8)
RBC # FLD: 12.3 % — SIGNIFICANT CHANGE UP (ref 10.3–14.5)
SODIUM SERPL-SCNC: 136 MMOL/L — SIGNIFICANT CHANGE UP (ref 135–145)
VANCOMYCIN TROUGH SERPL-MCNC: 12 UG/ML — SIGNIFICANT CHANGE UP (ref 10–20)
VANCOMYCIN TROUGH SERPL-MCNC: 53.8 UG/ML — CRITICAL HIGH (ref 10–20)
WBC # BLD: 11.26 K/UL — HIGH (ref 3.8–10.5)
WBC # FLD AUTO: 11.26 K/UL — HIGH (ref 3.8–10.5)

## 2024-06-30 PROCEDURE — 99232 SBSQ HOSP IP/OBS MODERATE 35: CPT | Mod: GC

## 2024-06-30 RX ORDER — VANCOMYCIN HYDROCHLORIDE 50 MG/ML
1500 KIT ORAL EVERY 8 HOURS
Refills: 0 | Status: COMPLETED | OUTPATIENT
Start: 2024-06-30 | End: 2024-06-30

## 2024-06-30 RX ADMIN — VANCOMYCIN HYDROCHLORIDE 300 MILLIGRAM(S): KIT at 14:11

## 2024-06-30 RX ADMIN — Medication 30 MILLILITER(S): at 23:05

## 2024-06-30 RX ADMIN — NICOTINE POLACRILEX 1 PATCH: 2 LOZENGE ORAL at 19:03

## 2024-06-30 RX ADMIN — NICOTINE POLACRILEX 1 PATCH: 2 LOZENGE ORAL at 07:25

## 2024-06-30 RX ADMIN — Medication 400 MILLIGRAM(S): at 05:13

## 2024-06-30 RX ADMIN — Medication 400 MILLIGRAM(S): at 04:13

## 2024-06-30 RX ADMIN — NICOTINE POLACRILEX 1 PATCH: 2 LOZENGE ORAL at 14:38

## 2024-06-30 RX ADMIN — Medication 400 MILLIGRAM(S): at 23:05

## 2024-06-30 RX ADMIN — NICOTINE POLACRILEX 1 PATCH: 2 LOZENGE ORAL at 14:10

## 2024-06-30 RX ADMIN — VANCOMYCIN HYDROCHLORIDE 300 MILLIGRAM(S): KIT at 04:04

## 2024-06-30 RX ADMIN — Medication 30 MILLILITER(S): at 04:13

## 2024-06-30 RX ADMIN — PANTOPRAZOLE SODIUM 40 MILLIGRAM(S): 40 INJECTION, POWDER, FOR SOLUTION INTRAVENOUS at 06:29

## 2024-06-30 NOTE — PROGRESS NOTE ADULT - SUBJECTIVE AND OBJECTIVE BOX
INTERVAL/OVERNIGHT EVENTS: None    SUBJECTIVE:  Patient seen and examined at bedside, comfortable, NAD. Denied fever, chest pain, dyspnea, abdominal pain.     Vital Signs Last 12 Hrs  T(F): 98 (06-30-24 @ 05:54), Max: 98 (06-30-24 @ 05:54)  HR: 65 (06-30-24 @ 05:54) (65 - 65)  BP: 100/60 (06-30-24 @ 05:54) (100/60 - 100/60)  BP(mean): --  RR: 16 (06-30-24 @ 05:54) (16 - 16)  SpO2: 98% (06-30-24 @ 05:54) (98% - 98%)  I&O's Summary    29 Jun 2024 07:01  -  30 Jun 2024 07:00  --------------------------------------------------------  IN: 0 mL / OUT: 750 mL / NET: -750 mL        PHYSICAL EXAM:  General: NAD  HEENT: PERRL, EOM intact, sclera anicteric, MMM  Cardiovascular: RRR; no MRG; no JVD  Respiratory: CTAB; no WRR  GI/: soft; NTND; BS x4  Extremities: WWP; 2+ peripheral pulses bilaterally; no LE edema  Back: ulcer on lower back, non-draining  Skin: normal color & turgor; no rash  Neurologic: aox3; no focal deficits    LABS:                        14.6   11.26 )-----------( 386      ( 30 Jun 2024 05:30 )             41.9     06-30    136  |  101  |  17  ----------------------------<  103<H>  3.8   |  25  |  0.89    Ca    9.3      30 Jun 2024 05:30  Phos  5.1     06-30  Mg     2.3     06-30    TPro  7.4  /  Alb  3.8  /  TBili  0.2  /  DBili  x   /  AST  18  /  ALT  37  /  AlkPhos  67  06-30      Urinalysis Basic - ( 30 Jun 2024 05:30 )    Color: x / Appearance: x / SG: x / pH: x  Gluc: 103 mg/dL / Ketone: x  / Bili: x / Urobili: x   Blood: x / Protein: x / Nitrite: x   Leuk Esterase: x / RBC: x / WBC x   Sq Epi: x / Non Sq Epi: x / Bacteria: x          RADIOLOGY & ADDITIONAL TESTS:    MEDICATIONS  (STANDING):  enoxaparin Injectable 40 milliGRAM(s) SubCutaneous every 24 hours  nicotine -   7 mG/24Hr(s) Patch 1 Patch Transdermal daily  pantoprazole    Tablet 40 milliGRAM(s) Oral before breakfast  vancomycin  IVPB 1500 milliGRAM(s) IV Intermittent every 8 hours    MEDICATIONS  (PRN):  acetaminophen     Tablet .. 650 milliGRAM(s) Oral every 6 hours PRN Mild Pain (1 - 3)  aluminum hydroxide/magnesium hydroxide/simethicone Suspension 30 milliLiter(s) Oral every 4 hours PRN Dyspepsia  ibuprofen  Tablet. 400 milliGRAM(s) Oral every 6 hours PRN Moderate Pain (4 - 6)  oxyCODONE    IR 2.5 milliGRAM(s) Oral every 4 hours PRN Severe Pain (7 - 10)

## 2024-07-01 LAB
ANION GAP SERPL CALC-SCNC: 11 MMOL/L — SIGNIFICANT CHANGE UP (ref 5–17)
BASOPHILS # BLD AUTO: 0.05 K/UL — SIGNIFICANT CHANGE UP (ref 0–0.2)
BASOPHILS NFR BLD AUTO: 0.5 % — SIGNIFICANT CHANGE UP (ref 0–2)
BUN SERPL-MCNC: 20 MG/DL — SIGNIFICANT CHANGE UP (ref 7–23)
CALCIUM SERPL-MCNC: 9.5 MG/DL — SIGNIFICANT CHANGE UP (ref 8.4–10.5)
CHLORIDE SERPL-SCNC: 103 MMOL/L — SIGNIFICANT CHANGE UP (ref 96–108)
CO2 SERPL-SCNC: 26 MMOL/L — SIGNIFICANT CHANGE UP (ref 22–31)
CREAT SERPL-MCNC: 0.84 MG/DL — SIGNIFICANT CHANGE UP (ref 0.5–1.3)
EGFR: 124 ML/MIN/1.73M2 — SIGNIFICANT CHANGE UP
EOSINOPHIL # BLD AUTO: 0.36 K/UL — SIGNIFICANT CHANGE UP (ref 0–0.5)
EOSINOPHIL NFR BLD AUTO: 3.4 % — SIGNIFICANT CHANGE UP (ref 0–6)
GLUCOSE SERPL-MCNC: 103 MG/DL — HIGH (ref 70–99)
HCT VFR BLD CALC: 41.2 % — SIGNIFICANT CHANGE UP (ref 39–50)
HGB BLD-MCNC: 14.2 G/DL — SIGNIFICANT CHANGE UP (ref 13–17)
IMM GRANULOCYTES NFR BLD AUTO: 0.8 % — SIGNIFICANT CHANGE UP (ref 0–0.9)
LYMPHOCYTES # BLD AUTO: 2.89 K/UL — SIGNIFICANT CHANGE UP (ref 1–3.3)
LYMPHOCYTES # BLD AUTO: 27.2 % — SIGNIFICANT CHANGE UP (ref 13–44)
MAGNESIUM SERPL-MCNC: 2.3 MG/DL — SIGNIFICANT CHANGE UP (ref 1.6–2.6)
MCHC RBC-ENTMCNC: 31.1 PG — SIGNIFICANT CHANGE UP (ref 27–34)
MCHC RBC-ENTMCNC: 34.5 GM/DL — SIGNIFICANT CHANGE UP (ref 32–36)
MCV RBC AUTO: 90.2 FL — SIGNIFICANT CHANGE UP (ref 80–100)
MONOCYTES # BLD AUTO: 0.81 K/UL — SIGNIFICANT CHANGE UP (ref 0–0.9)
MONOCYTES NFR BLD AUTO: 7.6 % — SIGNIFICANT CHANGE UP (ref 2–14)
NEUTROPHILS # BLD AUTO: 6.42 K/UL — SIGNIFICANT CHANGE UP (ref 1.8–7.4)
NEUTROPHILS NFR BLD AUTO: 60.5 % — SIGNIFICANT CHANGE UP (ref 43–77)
NRBC # BLD: 0 /100 WBCS — SIGNIFICANT CHANGE UP (ref 0–0)
PHOSPHATE SERPL-MCNC: 5.4 MG/DL — HIGH (ref 2.5–4.5)
PLATELET # BLD AUTO: 373 K/UL — SIGNIFICANT CHANGE UP (ref 150–400)
POTASSIUM SERPL-MCNC: 4.1 MMOL/L — SIGNIFICANT CHANGE UP (ref 3.5–5.3)
POTASSIUM SERPL-SCNC: 4.1 MMOL/L — SIGNIFICANT CHANGE UP (ref 3.5–5.3)
RBC # BLD: 4.57 M/UL — SIGNIFICANT CHANGE UP (ref 4.2–5.8)
RBC # FLD: 12.5 % — SIGNIFICANT CHANGE UP (ref 10.3–14.5)
SODIUM SERPL-SCNC: 140 MMOL/L — SIGNIFICANT CHANGE UP (ref 135–145)
VANCOMYCIN TROUGH SERPL-MCNC: 22.1 UG/ML — HIGH (ref 10–20)
WBC # BLD: 10.61 K/UL — HIGH (ref 3.8–10.5)
WBC # FLD AUTO: 10.61 K/UL — HIGH (ref 3.8–10.5)

## 2024-07-01 PROCEDURE — 99232 SBSQ HOSP IP/OBS MODERATE 35: CPT | Mod: GC

## 2024-07-01 RX ORDER — VANCOMYCIN HYDROCHLORIDE 50 MG/ML
1500 KIT ORAL EVERY 8 HOURS
Refills: 0 | Status: COMPLETED | OUTPATIENT
Start: 2024-07-01 | End: 2024-07-01

## 2024-07-01 RX ORDER — VANCOMYCIN HYDROCHLORIDE 50 MG/ML
1250 KIT ORAL EVERY 8 HOURS
Refills: 0 | Status: COMPLETED | OUTPATIENT
Start: 2024-07-01 | End: 2024-07-02

## 2024-07-01 RX ADMIN — NICOTINE POLACRILEX 1 PATCH: 2 LOZENGE ORAL at 07:16

## 2024-07-01 RX ADMIN — NICOTINE POLACRILEX 1 PATCH: 2 LOZENGE ORAL at 18:56

## 2024-07-01 RX ADMIN — PANTOPRAZOLE SODIUM 40 MILLIGRAM(S): 40 INJECTION, POWDER, FOR SOLUTION INTRAVENOUS at 07:10

## 2024-07-01 RX ADMIN — ENOXAPARIN SODIUM 40 MILLIGRAM(S): 100 INJECTION SUBCUTANEOUS at 13:32

## 2024-07-01 RX ADMIN — VANCOMYCIN HYDROCHLORIDE 166.67 MILLIGRAM(S): KIT at 23:27

## 2024-07-01 RX ADMIN — NICOTINE POLACRILEX 1 PATCH: 2 LOZENGE ORAL at 15:43

## 2024-07-01 RX ADMIN — VANCOMYCIN HYDROCHLORIDE 300 MILLIGRAM(S): KIT at 05:20

## 2024-07-01 RX ADMIN — Medication 30 MILLILITER(S): at 23:27

## 2024-07-01 RX ADMIN — OXYCODONE HYDROCHLORIDE 2.5 MILLIGRAM(S): 100 SOLUTION ORAL at 20:57

## 2024-07-01 RX ADMIN — OXYCODONE HYDROCHLORIDE 2.5 MILLIGRAM(S): 100 SOLUTION ORAL at 21:57

## 2024-07-01 RX ADMIN — NICOTINE POLACRILEX 1 PATCH: 2 LOZENGE ORAL at 13:32

## 2024-07-01 RX ADMIN — VANCOMYCIN HYDROCHLORIDE 300 MILLIGRAM(S): KIT at 13:32

## 2024-07-01 RX ADMIN — Medication 400 MILLIGRAM(S): at 00:05

## 2024-07-01 NOTE — PROGRESS NOTE ADULT - SUBJECTIVE AND OBJECTIVE BOX
**INCOMPLETE NOTE    INTERVAL/OVERNIGHT EVENTS: None    SUBJECTIVE:  Patient seen and examined at bedside, comfortable, NAD. Denied fever, chest pain, dyspnea, abdominal pain. He does admit to his knee popping with extension but otherwise no concerns.    Vital Signs Last 12 Hrs  T(F): 97.4 (07-01-24 @ 06:07), Max: 98.8 (06-30-24 @ 22:59)  HR: 61 (07-01-24 @ 06:07) (61 - 79)  BP: 100/48 (07-01-24 @ 06:07) (100/48 - 105/64)  BP(mean): --  RR: 18 (07-01-24 @ 06:07) (18 - 18)  SpO2: 97% (07-01-24 @ 06:07) (96% - 97%)  I&O's Summary    30 Jun 2024 07:01  -  01 Jul 2024 07:00  --------------------------------------------------------  IN: 500 mL / OUT: 0 mL / NET: 500 mL        PHYSICAL EXAM:  General: NAD  HEENT: PERRL, EOM intact, sclera anicteric, MMM  Cardiovascular: RRR; no MRG; no JVD  Respiratory: CTAB; no WRR  GI/: soft; NTND; BS x4  Extremities: WWP; 2+ peripheral pulses bilaterally; no LE edema  Skin: normal color & turgor; no rash  Neurologic: aox3; no focal deficits    LABS:                        14.2   10.61 )-----------( 373      ( 01 Jul 2024 05:30 )             41.2     07-01    140  |  103  |  20  ----------------------------<  103<H>  4.1   |  26  |  0.84    Ca    9.5      01 Jul 2024 05:30  Phos  5.4     07-01  Mg     2.3     07-01    TPro  7.4  /  Alb  3.8  /  TBili  0.2  /  DBili  x   /  AST  18  /  ALT  37  /  AlkPhos  67  06-30      Urinalysis Basic - ( 01 Jul 2024 05:30 )    Color: x / Appearance: x / SG: x / pH: x  Gluc: 103 mg/dL / Ketone: x  / Bili: x / Urobili: x   Blood: x / Protein: x / Nitrite: x   Leuk Esterase: x / RBC: x / WBC x   Sq Epi: x / Non Sq Epi: x / Bacteria: x          RADIOLOGY & ADDITIONAL TESTS:    MEDICATIONS  (STANDING):  enoxaparin Injectable 40 milliGRAM(s) SubCutaneous every 24 hours  nicotine -   7 mG/24Hr(s) Patch 1 Patch Transdermal daily  pantoprazole    Tablet 40 milliGRAM(s) Oral before breakfast  vancomycin  IVPB 1500 milliGRAM(s) IV Intermittent every 8 hours    MEDICATIONS  (PRN):  acetaminophen     Tablet .. 650 milliGRAM(s) Oral every 6 hours PRN Mild Pain (1 - 3)  aluminum hydroxide/magnesium hydroxide/simethicone Suspension 30 milliLiter(s) Oral every 4 hours PRN Dyspepsia  ibuprofen  Tablet. 400 milliGRAM(s) Oral every 6 hours PRN Moderate Pain (4 - 6)  oxyCODONE    IR 2.5 milliGRAM(s) Oral every 4 hours PRN Severe Pain (7 - 10)   **INCOMPLETE NOTE    INTERVAL/OVERNIGHT EVENTS: None    SUBJECTIVE:  Patient seen and examined at bedside, comfortable, NAD. Denied fever, chest pain, dyspnea, abdominal pain. He does admit to occasional knee popping with extension but otherwise no concerns.    Vital Signs Last 12 Hrs  T(F): 97.4 (07-01-24 @ 06:07), Max: 98.8 (06-30-24 @ 22:59)  HR: 61 (07-01-24 @ 06:07) (61 - 79)  BP: 100/48 (07-01-24 @ 06:07) (100/48 - 105/64)  BP(mean): --  RR: 18 (07-01-24 @ 06:07) (18 - 18)  SpO2: 97% (07-01-24 @ 06:07) (96% - 97%)  I&O's Summary    30 Jun 2024 07:01  -  01 Jul 2024 07:00  --------------------------------------------------------  IN: 500 mL / OUT: 0 mL / NET: 500 mL        PHYSICAL EXAM:  General: NAD  HEENT: PERRL, EOM intact, sclera anicteric, MMM  Cardiovascular: RRR; no MRG; no JVD  Respiratory: CTAB; no WRR  GI/: soft; NTND; BS x4  Extremities: WWP; 2+ peripheral pulses bilaterally; no LE edema  Skin: normal color & turgor; no rash  Neurologic: aox3; no focal deficits    LABS:                        14.2   10.61 )-----------( 373      ( 01 Jul 2024 05:30 )             41.2     07-01    140  |  103  |  20  ----------------------------<  103<H>  4.1   |  26  |  0.84    Ca    9.5      01 Jul 2024 05:30  Phos  5.4     07-01  Mg     2.3     07-01    TPro  7.4  /  Alb  3.8  /  TBili  0.2  /  DBili  x   /  AST  18  /  ALT  37  /  AlkPhos  67  06-30      Urinalysis Basic - ( 01 Jul 2024 05:30 )    Color: x / Appearance: x / SG: x / pH: x  Gluc: 103 mg/dL / Ketone: x  / Bili: x / Urobili: x   Blood: x / Protein: x / Nitrite: x   Leuk Esterase: x / RBC: x / WBC x   Sq Epi: x / Non Sq Epi: x / Bacteria: x          RADIOLOGY & ADDITIONAL TESTS:    MEDICATIONS  (STANDING):  enoxaparin Injectable 40 milliGRAM(s) SubCutaneous every 24 hours  nicotine -   7 mG/24Hr(s) Patch 1 Patch Transdermal daily  pantoprazole    Tablet 40 milliGRAM(s) Oral before breakfast  vancomycin  IVPB 1500 milliGRAM(s) IV Intermittent every 8 hours    MEDICATIONS  (PRN):  acetaminophen     Tablet .. 650 milliGRAM(s) Oral every 6 hours PRN Mild Pain (1 - 3)  aluminum hydroxide/magnesium hydroxide/simethicone Suspension 30 milliLiter(s) Oral every 4 hours PRN Dyspepsia  ibuprofen  Tablet. 400 milliGRAM(s) Oral every 6 hours PRN Moderate Pain (4 - 6)  oxyCODONE    IR 2.5 milliGRAM(s) Oral every 4 hours PRN Severe Pain (7 - 10)   INTERVAL/OVERNIGHT EVENTS: None    SUBJECTIVE:  Patient seen and examined at bedside, comfortable, NAD. Denied fever, chest pain, dyspnea, abdominal pain. He does admit to occasional knee popping with extension but otherwise no concerns.    Vital Signs Last 12 Hrs  T(F): 97.4 (07-01-24 @ 06:07), Max: 98.8 (06-30-24 @ 22:59)  HR: 61 (07-01-24 @ 06:07) (61 - 79)  BP: 100/48 (07-01-24 @ 06:07) (100/48 - 105/64)  BP(mean): --  RR: 18 (07-01-24 @ 06:07) (18 - 18)  SpO2: 97% (07-01-24 @ 06:07) (96% - 97%)  I&O's Summary    30 Jun 2024 07:01  -  01 Jul 2024 07:00  --------------------------------------------------------  IN: 500 mL / OUT: 0 mL / NET: 500 mL        PHYSICAL EXAM:  General: NAD  HEENT: PERRL, EOM intact, sclera anicteric, MMM  Cardiovascular: RRR; no MRG; no JVD  Respiratory: CTAB; no WRR  GI/: soft; NTND; BS x4  Extremities: WWP; 2+ peripheral pulses bilaterally; no LE edema  Skin: normal color & turgor; no rash  Neurologic: aox3; no focal deficits    LABS:                        14.2   10.61 )-----------( 373      ( 01 Jul 2024 05:30 )             41.2     07-01    140  |  103  |  20  ----------------------------<  103<H>  4.1   |  26  |  0.84    Ca    9.5      01 Jul 2024 05:30  Phos  5.4     07-01  Mg     2.3     07-01    TPro  7.4  /  Alb  3.8  /  TBili  0.2  /  DBili  x   /  AST  18  /  ALT  37  /  AlkPhos  67  06-30      Urinalysis Basic - ( 01 Jul 2024 05:30 )    Color: x / Appearance: x / SG: x / pH: x  Gluc: 103 mg/dL / Ketone: x  / Bili: x / Urobili: x   Blood: x / Protein: x / Nitrite: x   Leuk Esterase: x / RBC: x / WBC x   Sq Epi: x / Non Sq Epi: x / Bacteria: x          RADIOLOGY & ADDITIONAL TESTS:    MEDICATIONS  (STANDING):  enoxaparin Injectable 40 milliGRAM(s) SubCutaneous every 24 hours  nicotine -   7 mG/24Hr(s) Patch 1 Patch Transdermal daily  pantoprazole    Tablet 40 milliGRAM(s) Oral before breakfast  vancomycin  IVPB 1500 milliGRAM(s) IV Intermittent every 8 hours    MEDICATIONS  (PRN):  acetaminophen     Tablet .. 650 milliGRAM(s) Oral every 6 hours PRN Mild Pain (1 - 3)  aluminum hydroxide/magnesium hydroxide/simethicone Suspension 30 milliLiter(s) Oral every 4 hours PRN Dyspepsia  ibuprofen  Tablet. 400 milliGRAM(s) Oral every 6 hours PRN Moderate Pain (4 - 6)  oxyCODONE    IR 2.5 milliGRAM(s) Oral every 4 hours PRN Severe Pain (7 - 10)

## 2024-07-01 NOTE — PROGRESS NOTE ADULT - ASSESSMENT
22 y/o M w/ substance use disorder who presented with lower back pain, found to be bacteremic 2/2 lower back abscess, clinically stable and being treated with IV vancomycin.

## 2024-07-02 LAB
ANION GAP SERPL CALC-SCNC: 10 MMOL/L — SIGNIFICANT CHANGE UP (ref 5–17)
BASOPHILS # BLD AUTO: 0.09 K/UL — SIGNIFICANT CHANGE UP (ref 0–0.2)
BASOPHILS NFR BLD AUTO: 0.8 % — SIGNIFICANT CHANGE UP (ref 0–2)
BUN SERPL-MCNC: 18 MG/DL — SIGNIFICANT CHANGE UP (ref 7–23)
CALCIUM SERPL-MCNC: 9.5 MG/DL — SIGNIFICANT CHANGE UP (ref 8.4–10.5)
CHLORIDE SERPL-SCNC: 102 MMOL/L — SIGNIFICANT CHANGE UP (ref 96–108)
CO2 SERPL-SCNC: 25 MMOL/L — SIGNIFICANT CHANGE UP (ref 22–31)
CREAT SERPL-MCNC: 0.77 MG/DL — SIGNIFICANT CHANGE UP (ref 0.5–1.3)
EGFR: 127 ML/MIN/1.73M2 — SIGNIFICANT CHANGE UP
EOSINOPHIL # BLD AUTO: 0.37 K/UL — SIGNIFICANT CHANGE UP (ref 0–0.5)
EOSINOPHIL NFR BLD AUTO: 3.2 % — SIGNIFICANT CHANGE UP (ref 0–6)
GLUCOSE SERPL-MCNC: 105 MG/DL — HIGH (ref 70–99)
HCT VFR BLD CALC: 41.7 % — SIGNIFICANT CHANGE UP (ref 39–50)
HGB BLD-MCNC: 14.4 G/DL — SIGNIFICANT CHANGE UP (ref 13–17)
IMM GRANULOCYTES NFR BLD AUTO: 1 % — HIGH (ref 0–0.9)
LYMPHOCYTES # BLD AUTO: 2.98 K/UL — SIGNIFICANT CHANGE UP (ref 1–3.3)
LYMPHOCYTES # BLD AUTO: 25.7 % — SIGNIFICANT CHANGE UP (ref 13–44)
MAGNESIUM SERPL-MCNC: 2 MG/DL — SIGNIFICANT CHANGE UP (ref 1.6–2.6)
MCHC RBC-ENTMCNC: 31.7 PG — SIGNIFICANT CHANGE UP (ref 27–34)
MCHC RBC-ENTMCNC: 34.5 GM/DL — SIGNIFICANT CHANGE UP (ref 32–36)
MCV RBC AUTO: 91.9 FL — SIGNIFICANT CHANGE UP (ref 80–100)
MONOCYTES # BLD AUTO: 0.91 K/UL — HIGH (ref 0–0.9)
MONOCYTES NFR BLD AUTO: 7.8 % — SIGNIFICANT CHANGE UP (ref 2–14)
NEUTROPHILS # BLD AUTO: 7.13 K/UL — SIGNIFICANT CHANGE UP (ref 1.8–7.4)
NEUTROPHILS NFR BLD AUTO: 61.5 % — SIGNIFICANT CHANGE UP (ref 43–77)
NRBC # BLD: 0 /100 WBCS — SIGNIFICANT CHANGE UP (ref 0–0)
PHOSPHATE SERPL-MCNC: 5.3 MG/DL — HIGH (ref 2.5–4.5)
PLATELET # BLD AUTO: 369 K/UL — SIGNIFICANT CHANGE UP (ref 150–400)
POTASSIUM SERPL-MCNC: 4 MMOL/L — SIGNIFICANT CHANGE UP (ref 3.5–5.3)
POTASSIUM SERPL-SCNC: 4 MMOL/L — SIGNIFICANT CHANGE UP (ref 3.5–5.3)
RBC # BLD: 4.54 M/UL — SIGNIFICANT CHANGE UP (ref 4.2–5.8)
RBC # FLD: 12.4 % — SIGNIFICANT CHANGE UP (ref 10.3–14.5)
SODIUM SERPL-SCNC: 137 MMOL/L — SIGNIFICANT CHANGE UP (ref 135–145)
VANCOMYCIN TROUGH SERPL-MCNC: 15.4 UG/ML — SIGNIFICANT CHANGE UP (ref 10–20)
WBC # BLD: 11.6 K/UL — HIGH (ref 3.8–10.5)
WBC # FLD AUTO: 11.6 K/UL — HIGH (ref 3.8–10.5)

## 2024-07-02 PROCEDURE — 99232 SBSQ HOSP IP/OBS MODERATE 35: CPT

## 2024-07-02 RX ORDER — VANCOMYCIN HYDROCHLORIDE 50 MG/ML
1250 KIT ORAL EVERY 8 HOURS
Refills: 0 | Status: DISCONTINUED | OUTPATIENT
Start: 2024-07-02 | End: 2024-07-19

## 2024-07-02 RX ADMIN — NICOTINE POLACRILEX 1 PATCH: 2 LOZENGE ORAL at 18:33

## 2024-07-02 RX ADMIN — Medication 400 MILLIGRAM(S): at 07:25

## 2024-07-02 RX ADMIN — ENOXAPARIN SODIUM 40 MILLIGRAM(S): 100 INJECTION SUBCUTANEOUS at 13:05

## 2024-07-02 RX ADMIN — VANCOMYCIN HYDROCHLORIDE 166.67 MILLIGRAM(S): KIT at 13:06

## 2024-07-02 RX ADMIN — NICOTINE POLACRILEX 1 PATCH: 2 LOZENGE ORAL at 06:33

## 2024-07-02 RX ADMIN — NICOTINE POLACRILEX 1 PATCH: 2 LOZENGE ORAL at 13:05

## 2024-07-02 RX ADMIN — Medication 400 MILLIGRAM(S): at 06:25

## 2024-07-02 RX ADMIN — VANCOMYCIN HYDROCHLORIDE 166.67 MILLIGRAM(S): KIT at 21:41

## 2024-07-02 RX ADMIN — VANCOMYCIN HYDROCHLORIDE 166.67 MILLIGRAM(S): KIT at 06:25

## 2024-07-02 RX ADMIN — Medication 30 MILLILITER(S): at 21:41

## 2024-07-02 RX ADMIN — PANTOPRAZOLE SODIUM 40 MILLIGRAM(S): 40 INJECTION, POWDER, FOR SOLUTION INTRAVENOUS at 06:25

## 2024-07-02 RX ADMIN — Medication 400 MILLIGRAM(S): at 22:41

## 2024-07-02 RX ADMIN — Medication 400 MILLIGRAM(S): at 21:41

## 2024-07-02 NOTE — PROGRESS NOTE ADULT - SUBJECTIVE AND OBJECTIVE BOX
INTERVAL/OVERNIGHT EVENTS: Patient's vanc trough on 7/1 was supratherapeutic and he was switched to vancomycin 1250 mg IV Q8 per pharmacy recommendations.    SUBJECTIVE:  Patient seen and examined at bedside, comfortable, lying on his side and in NAD. Denied fever, chest pain, dyspnea, abdominal pain.     Vital Signs Last 12 Hrs  T(F): 97.7 (07-02-24 @ 09:00), Max: 98.3 (07-02-24 @ 05:32)  HR: 66 (07-02-24 @ 09:00) (66 - 68)  BP: 126/67 (07-02-24 @ 09:00) (110/69 - 126/67)  BP(mean): --  RR: 16 (07-02-24 @ 09:00) (16 - 18)  SpO2: 98% (07-02-24 @ 09:00) (96% - 98%)  I&O's Summary      PHYSICAL EXAM:  General: NAD  HEENT: PERRL, EOM intact, sclera anicteric, MMM  Cardiovascular: RRR; no MRG; no JVD  Respiratory: CTAB; no WRR  GI/: soft; NTND; BS x4  Extremities: WWP; 2+ peripheral pulses bilaterally; no LE edema  Skin: normal color & turgor; no rash  Neurologic: aox3; no focal deficits    LABS:                        14.4   11.60 )-----------( 369      ( 02 Jul 2024 05:30 )             41.7     07-02    137  |  102  |  18  ----------------------------<  105<H>  4.0   |  25  |  0.77    Ca    9.5      02 Jul 2024 05:30  Phos  5.3     07-02  Mg     2.0     07-02        Urinalysis Basic - ( 02 Jul 2024 05:30 )    Color: x / Appearance: x / SG: x / pH: x  Gluc: 105 mg/dL / Ketone: x  / Bili: x / Urobili: x   Blood: x / Protein: x / Nitrite: x   Leuk Esterase: x / RBC: x / WBC x   Sq Epi: x / Non Sq Epi: x / Bacteria: x          RADIOLOGY & ADDITIONAL TESTS:    MEDICATIONS  (STANDING):  enoxaparin Injectable 40 milliGRAM(s) SubCutaneous every 24 hours  nicotine -   7 mG/24Hr(s) Patch 1 Patch Transdermal daily  pantoprazole    Tablet 40 milliGRAM(s) Oral before breakfast  vancomycin  IVPB 1250 milliGRAM(s) IV Intermittent every 8 hours    MEDICATIONS  (PRN):  acetaminophen     Tablet .. 650 milliGRAM(s) Oral every 6 hours PRN Mild Pain (1 - 3)  aluminum hydroxide/magnesium hydroxide/simethicone Suspension 30 milliLiter(s) Oral every 4 hours PRN Dyspepsia  ibuprofen  Tablet. 400 milliGRAM(s) Oral every 6 hours PRN Moderate Pain (4 - 6)  oxyCODONE    IR 2.5 milliGRAM(s) Oral every 4 hours PRN Severe Pain (7 - 10)

## 2024-07-02 NOTE — PROGRESS NOTE ADULT - ASSESSMENT
23 year old male with substance use disorder who presented with lower back pain, found to be bacteremic 2/2 lower back abscess, clinically stable and being treated with IV vancomycin.

## 2024-07-03 LAB — VANCOMYCIN TROUGH SERPL-MCNC: 14.5 UG/ML — SIGNIFICANT CHANGE UP (ref 10–20)

## 2024-07-03 PROCEDURE — 99232 SBSQ HOSP IP/OBS MODERATE 35: CPT | Mod: GC

## 2024-07-03 RX ORDER — OXYCODONE HYDROCHLORIDE 100 MG/5ML
2.5 SOLUTION ORAL EVERY 4 HOURS
Refills: 0 | Status: DISCONTINUED | OUTPATIENT
Start: 2024-07-03 | End: 2024-07-04

## 2024-07-03 RX ADMIN — VANCOMYCIN HYDROCHLORIDE 166.67 MILLIGRAM(S): KIT at 15:24

## 2024-07-03 RX ADMIN — NICOTINE POLACRILEX 1 PATCH: 2 LOZENGE ORAL at 11:47

## 2024-07-03 RX ADMIN — VANCOMYCIN HYDROCHLORIDE 166.67 MILLIGRAM(S): KIT at 06:27

## 2024-07-03 RX ADMIN — Medication 400 MILLIGRAM(S): at 06:26

## 2024-07-03 RX ADMIN — ENOXAPARIN SODIUM 40 MILLIGRAM(S): 100 INJECTION SUBCUTANEOUS at 15:24

## 2024-07-03 RX ADMIN — Medication 400 MILLIGRAM(S): at 07:26

## 2024-07-03 RX ADMIN — Medication 30 MILLILITER(S): at 21:33

## 2024-07-03 RX ADMIN — PANTOPRAZOLE SODIUM 40 MILLIGRAM(S): 40 INJECTION, POWDER, FOR SOLUTION INTRAVENOUS at 06:27

## 2024-07-03 RX ADMIN — Medication 400 MILLIGRAM(S): at 21:33

## 2024-07-03 NOTE — PROGRESS NOTE ADULT - PROBLEM SELECTOR PLAN 1
sepsis POA (WBC, HR); BCx 6/20 growing MRSA in 2/2 bottles; source likely skin (wound above gluteal cleft, drainage cx growing MRSA; folliculitis of extremities); no e/o abscess on CT L-spine; HIV negative; WBC normalized on vanc; surveillance BCx 6/21 NGTD; no e/o IE on TTE, pretest probability not sufficiently high to warrant AUBREY  -- cont. vanc, dose per level; goal trough 13-18; plan for 4-week course (through 7/19), per ID recs  -- supportive care  -- contact precautions  -- f/u surveillance cultures  -- Vanc trouph at noon sepsis POA (WBC, HR); BCx 6/20 growing MRSA in 2/2 bottles; source likely skin (wound above gluteal cleft, drainage cx growing MRSA; folliculitis of extremities); no e/o abscess on CT L-spine; HIV negative; WBC normalized on vanc; surveillance BCx 6/21 NGTD; no e/o IE on TTE, pretest probability not sufficiently high to warrant AUBREY  -- cont. vanc, dose per level; goal trough 13-18; plan for 4-week course (through 7/19), per ID recs  -- supportive care  -- contact precautions  -- f/u surveillance cultures  -- Vanc trouph 14.5

## 2024-07-03 NOTE — PROGRESS NOTE ADULT - SUBJECTIVE AND OBJECTIVE BOX
Patient is a 23y old  Male who presents with a chief complaint of Bacteremia secondary to lower back abscess (02 Jul 2024 10:19)      HOSPITAL COURSE:     OVERNIGHT EVENTS:    SUBJECTIVE:     ROS: otherwise negative      T(C): 36.4 (07-03-24 @ 05:30), Max: 36.8 (07-02-24 @ 17:19)  HR: 70 (07-03-24 @ 05:30) (70 - 79)  BP: 103/60 (07-03-24 @ 05:30) (103/60 - 115/63)  RR: 18 (07-03-24 @ 05:30) (17 - 18)  SpO2: 97% (07-03-24 @ 05:30) (97% - 99%)  Wt(kg): --Vital Signs Last 24 Hrs  T(C): 36.4 (03 Jul 2024 05:30), Max: 36.8 (02 Jul 2024 17:19)  T(F): 97.6 (03 Jul 2024 05:30), Max: 98.3 (02 Jul 2024 17:19)  HR: 70 (03 Jul 2024 05:30) (70 - 79)  BP: 103/60 (03 Jul 2024 05:30) (103/60 - 115/63)  BP(mean): --  RR: 18 (03 Jul 2024 05:30) (17 - 18)  SpO2: 97% (03 Jul 2024 05:30) (97% - 99%)    Parameters below as of 02 Jul 2024 22:02  Patient On (Oxygen Delivery Method): room air        PHYSICAL EXAM:  Constitutional: resting comfortably in bed; NAD  Head: NC/AT  Eyes: PERRL, EOMI, anicteric sclera  ENT: no nasal discharge; MMM  Neck: supple; no JVD or thyromegaly  Respiratory: CTA B/L; no W/R/R, no retractions  Cardiac: +S1/S2; RRR; no M/R/G  Gastrointestinal: soft, NT/ND; no rebound or guarding; +BSx4  Back: spine midline, no bony tenderness or step-offs; no CVAT B/L  Extremities: WWP, no clubbing or cyanosis; no peripheral edema. Capillary refill <2 sec  Musculoskeletal: NROM x4; no joint swelling, tenderness or erythema  Vascular: 2+ radial, DP/PT pulses B/L  Dermatologic: skin warm, dry and intact; no rashes, wounds, or scars  Lymphatic: no submandibular or cervical LAD  Neurologic: AAOx3; CNII-XII grossly intact; no focal deficits  Psychiatric: affect and characteristics of appearance, verbalizations, behaviors are appropriate    LABS:                        14.4   11.60 )-----------( 369      ( 02 Jul 2024 05:30 )             41.7     07-02    137  |  102  |  18  ----------------------------<  105<H>  4.0   |  25  |  0.77    Ca    9.5      02 Jul 2024 05:30  Phos  5.3     07-02  Mg     2.0     07-02          Urinalysis Basic - ( 02 Jul 2024 05:30 )    Color: x / Appearance: x / SG: x / pH: x  Gluc: 105 mg/dL / Ketone: x  / Bili: x / Urobili: x   Blood: x / Protein: x / Nitrite: x   Leuk Esterase: x / RBC: x / WBC x   Sq Epi: x / Non Sq Epi: x / Bacteria: x      CAPILLARY BLOOD GLUCOSE            Urinalysis Basic - ( 02 Jul 2024 05:30 )    Color: x / Appearance: x / SG: x / pH: x  Gluc: 105 mg/dL / Ketone: x  / Bili: x / Urobili: x   Blood: x / Protein: x / Nitrite: x   Leuk Esterase: x / RBC: x / WBC x   Sq Epi: x / Non Sq Epi: x / Bacteria: x        MEDICATIONS  (STANDING):  enoxaparin Injectable 40 milliGRAM(s) SubCutaneous every 24 hours  nicotine -   7 mG/24Hr(s) Patch 1 Patch Transdermal daily  pantoprazole    Tablet 40 milliGRAM(s) Oral before breakfast  vancomycin  IVPB 1250 milliGRAM(s) IV Intermittent every 8 hours    MEDICATIONS  (PRN):  acetaminophen     Tablet .. 650 milliGRAM(s) Oral every 6 hours PRN Mild Pain (1 - 3)  aluminum hydroxide/magnesium hydroxide/simethicone Suspension 30 milliLiter(s) Oral every 4 hours PRN Dyspepsia  ibuprofen  Tablet. 400 milliGRAM(s) Oral every 6 hours PRN Moderate Pain (4 - 6)  oxyCODONE    IR 2.5 milliGRAM(s) Oral every 4 hours PRN Severe Pain (7 - 10)      RADIOLOGY & ADDITIONAL TESTS: Reviewed   Patient is a 23y old  Male who presents with a chief complaint of Bacteremia secondary to lower back abscess (02 Jul 2024 10:19)      HOSPITAL COURSE: Patient's vanc trough on 7/1 was supratherapeutic and he was switched to vancomycin 1250 mg IV Q8 per pharmacy recommendations. Patient vanc trough is 14.5 on 7/3 within therapeutic rage. Patient will remain on current regimen until 7/19 with plans of disposition to a shelter.     OVERNIGHT EVENTS: Patient had no acute over night events     SUBJECTIVE: patient seen and examined at bedside.  Patient reported having right knee pain which appears to be chronic.     ROS: otherwise negative      T(C): 36.4 (07-03-24 @ 05:30), Max: 36.8 (07-02-24 @ 17:19)  HR: 70 (07-03-24 @ 05:30) (70 - 79)  BP: 103/60 (07-03-24 @ 05:30) (103/60 - 115/63)  RR: 18 (07-03-24 @ 05:30) (17 - 18)  SpO2: 97% (07-03-24 @ 05:30) (97% - 99%)  Wt(kg): --Vital Signs Last 24 Hrs  T(C): 36.4 (03 Jul 2024 05:30), Max: 36.8 (02 Jul 2024 17:19)  T(F): 97.6 (03 Jul 2024 05:30), Max: 98.3 (02 Jul 2024 17:19)  HR: 70 (03 Jul 2024 05:30) (70 - 79)  BP: 103/60 (03 Jul 2024 05:30) (103/60 - 115/63)  BP(mean): --  RR: 18 (03 Jul 2024 05:30) (17 - 18)  SpO2: 97% (03 Jul 2024 05:30) (97% - 99%)    Parameters below as of 02 Jul 2024 22:02  Patient On (Oxygen Delivery Method): room air        PHYSICAL EXAM:  Constitutional: resting comfortably in bed; NAD  Head: NC/AT  Eyes: PERRL, EOMI, anicteric sclera  ENT: no nasal discharge; MMM  Neck: supple; no JVD or thyromegaly  Respiratory: CTA B/L; no W/R/R, no retractions  Cardiac: +S1/S2; RRR; no M/R/G  Gastrointestinal: soft, NT/ND; no rebound or guarding; +BSx4  Back: spine midline, no bony tenderness or step-offs; no CVAT B/L  Extremities: WWP, no clubbing or cyanosis; no peripheral edema. Capillary refill <2 sec  Musculoskeletal: NROM x4; no joint swelling, tenderness or erythema  Vascular: 2+ radial, DP/PT pulses B/L  Dermatologic: ulcerated wound above gluteal cleft. no rashes, no scars  Lymphatic: no submandibular or cervical LAD  Neurologic: AAOx3; CNII-XII grossly intact; no focal deficits  Psychiatric: affect and characteristics of appearance, verbalizations, behaviors are appropriate    LABS:                        14.4   11.60 )-----------( 369      ( 02 Jul 2024 05:30 )             41.7     07-02    137  |  102  |  18  ----------------------------<  105<H>  4.0   |  25  |  0.77    Ca    9.5      02 Jul 2024 05:30  Phos  5.3     07-02  Mg     2.0     07-02          Urinalysis Basic - ( 02 Jul 2024 05:30 )    Color: x / Appearance: x / SG: x / pH: x  Gluc: 105 mg/dL / Ketone: x  / Bili: x / Urobili: x   Blood: x / Protein: x / Nitrite: x   Leuk Esterase: x / RBC: x / WBC x   Sq Epi: x / Non Sq Epi: x / Bacteria: x      CAPILLARY BLOOD GLUCOSE            Urinalysis Basic - ( 02 Jul 2024 05:30 )    Color: x / Appearance: x / SG: x / pH: x  Gluc: 105 mg/dL / Ketone: x  / Bili: x / Urobili: x   Blood: x / Protein: x / Nitrite: x   Leuk Esterase: x / RBC: x / WBC x   Sq Epi: x / Non Sq Epi: x / Bacteria: x        MEDICATIONS  (STANDING):  enoxaparin Injectable 40 milliGRAM(s) SubCutaneous every 24 hours  nicotine -   7 mG/24Hr(s) Patch 1 Patch Transdermal daily  pantoprazole    Tablet 40 milliGRAM(s) Oral before breakfast  vancomycin  IVPB 1250 milliGRAM(s) IV Intermittent every 8 hours    MEDICATIONS  (PRN):  acetaminophen     Tablet .. 650 milliGRAM(s) Oral every 6 hours PRN Mild Pain (1 - 3)  aluminum hydroxide/magnesium hydroxide/simethicone Suspension 30 milliLiter(s) Oral every 4 hours PRN Dyspepsia  ibuprofen  Tablet. 400 milliGRAM(s) Oral every 6 hours PRN Moderate Pain (4 - 6)  oxyCODONE    IR 2.5 milliGRAM(s) Oral every 4 hours PRN Severe Pain (7 - 10)      RADIOLOGY & ADDITIONAL TESTS: Reviewed

## 2024-07-04 LAB
ANION GAP SERPL CALC-SCNC: 12 MMOL/L — SIGNIFICANT CHANGE UP (ref 5–17)
BASOPHILS # BLD AUTO: 0.07 K/UL — SIGNIFICANT CHANGE UP (ref 0–0.2)
BASOPHILS NFR BLD AUTO: 0.8 % — SIGNIFICANT CHANGE UP (ref 0–2)
BUN SERPL-MCNC: 17 MG/DL — SIGNIFICANT CHANGE UP (ref 7–23)
CALCIUM SERPL-MCNC: 9.3 MG/DL — SIGNIFICANT CHANGE UP (ref 8.4–10.5)
CHLORIDE SERPL-SCNC: 102 MMOL/L — SIGNIFICANT CHANGE UP (ref 96–108)
CO2 SERPL-SCNC: 25 MMOL/L — SIGNIFICANT CHANGE UP (ref 22–31)
CREAT SERPL-MCNC: 0.85 MG/DL — SIGNIFICANT CHANGE UP (ref 0.5–1.3)
EGFR: 124 ML/MIN/1.73M2 — SIGNIFICANT CHANGE UP
EOSINOPHIL # BLD AUTO: 0.27 K/UL — SIGNIFICANT CHANGE UP (ref 0–0.5)
EOSINOPHIL NFR BLD AUTO: 3.1 % — SIGNIFICANT CHANGE UP (ref 0–6)
GLUCOSE SERPL-MCNC: 112 MG/DL — HIGH (ref 70–99)
HCT VFR BLD CALC: 44.7 % — SIGNIFICANT CHANGE UP (ref 39–50)
HGB BLD-MCNC: 14.7 G/DL — SIGNIFICANT CHANGE UP (ref 13–17)
IMM GRANULOCYTES NFR BLD AUTO: 0.3 % — SIGNIFICANT CHANGE UP (ref 0–0.9)
LYMPHOCYTES # BLD AUTO: 2.51 K/UL — SIGNIFICANT CHANGE UP (ref 1–3.3)
LYMPHOCYTES # BLD AUTO: 28.5 % — SIGNIFICANT CHANGE UP (ref 13–44)
MAGNESIUM SERPL-MCNC: 2.2 MG/DL — SIGNIFICANT CHANGE UP (ref 1.6–2.6)
MCHC RBC-ENTMCNC: 30.5 PG — SIGNIFICANT CHANGE UP (ref 27–34)
MCHC RBC-ENTMCNC: 32.9 GM/DL — SIGNIFICANT CHANGE UP (ref 32–36)
MCV RBC AUTO: 92.7 FL — SIGNIFICANT CHANGE UP (ref 80–100)
MONOCYTES # BLD AUTO: 0.99 K/UL — HIGH (ref 0–0.9)
MONOCYTES NFR BLD AUTO: 11.3 % — SIGNIFICANT CHANGE UP (ref 2–14)
NEUTROPHILS # BLD AUTO: 4.93 K/UL — SIGNIFICANT CHANGE UP (ref 1.8–7.4)
NEUTROPHILS NFR BLD AUTO: 56 % — SIGNIFICANT CHANGE UP (ref 43–77)
NRBC # BLD: 0 /100 WBCS — SIGNIFICANT CHANGE UP (ref 0–0)
PHOSPHATE SERPL-MCNC: 4.5 MG/DL — SIGNIFICANT CHANGE UP (ref 2.5–4.5)
PLATELET # BLD AUTO: 370 K/UL — SIGNIFICANT CHANGE UP (ref 150–400)
POTASSIUM SERPL-MCNC: 4.1 MMOL/L — SIGNIFICANT CHANGE UP (ref 3.5–5.3)
POTASSIUM SERPL-SCNC: 4.1 MMOL/L — SIGNIFICANT CHANGE UP (ref 3.5–5.3)
RBC # BLD: 4.82 M/UL — SIGNIFICANT CHANGE UP (ref 4.2–5.8)
RBC # FLD: 12.3 % — SIGNIFICANT CHANGE UP (ref 10.3–14.5)
SODIUM SERPL-SCNC: 139 MMOL/L — SIGNIFICANT CHANGE UP (ref 135–145)
VANCOMYCIN TROUGH SERPL-MCNC: 10.3 UG/ML — SIGNIFICANT CHANGE UP (ref 10–20)
WBC # BLD: 8.8 K/UL — SIGNIFICANT CHANGE UP (ref 3.8–10.5)
WBC # FLD AUTO: 8.8 K/UL — SIGNIFICANT CHANGE UP (ref 3.8–10.5)

## 2024-07-04 PROCEDURE — 99232 SBSQ HOSP IP/OBS MODERATE 35: CPT | Mod: GC

## 2024-07-04 RX ADMIN — Medication 30 MILLILITER(S): at 19:09

## 2024-07-04 RX ADMIN — NICOTINE POLACRILEX 1 PATCH: 2 LOZENGE ORAL at 13:15

## 2024-07-04 RX ADMIN — NICOTINE POLACRILEX 1 PATCH: 2 LOZENGE ORAL at 14:26

## 2024-07-04 RX ADMIN — VANCOMYCIN HYDROCHLORIDE 166.67 MILLIGRAM(S): KIT at 01:56

## 2024-07-04 RX ADMIN — PANTOPRAZOLE SODIUM 40 MILLIGRAM(S): 40 INJECTION, POWDER, FOR SOLUTION INTRAVENOUS at 06:35

## 2024-07-04 RX ADMIN — VANCOMYCIN HYDROCHLORIDE 166.67 MILLIGRAM(S): KIT at 09:50

## 2024-07-04 RX ADMIN — NICOTINE POLACRILEX 1 PATCH: 2 LOZENGE ORAL at 19:00

## 2024-07-04 RX ADMIN — NICOTINE POLACRILEX 1 PATCH: 2 LOZENGE ORAL at 06:45

## 2024-07-04 RX ADMIN — ENOXAPARIN SODIUM 40 MILLIGRAM(S): 100 INJECTION SUBCUTANEOUS at 13:16

## 2024-07-04 RX ADMIN — VANCOMYCIN HYDROCHLORIDE 166.67 MILLIGRAM(S): KIT at 18:48

## 2024-07-04 NOTE — PROGRESS NOTE ADULT - PROBLEM SELECTOR PLAN 1
sepsis POA (WBC, HR); BCx 6/20 growing MRSA in 2/2 bottles; source likely skin (wound above gluteal cleft, drainage cx growing MRSA; folliculitis of extremities); no e/o abscess on CT L-spine; HIV negative; WBC normalized on vanc; surveillance BCx 6/21 NGTD; no e/o IE on TTE, pretest probability not sufficiently high to warrant AUBREY  -- cont. vanc, dose per level; goal trough 13-18; plan for 4-week course (through 7/19), per ID recs  -- supportive care  -- contact precautions  -- f/u surveillance cultures  -- Vanc trouph 14.5

## 2024-07-04 NOTE — PROGRESS NOTE ADULT - SUBJECTIVE AND OBJECTIVE BOX
Patient is a 23y old  Male who presents with a chief complaint of MRSA bacteremia (03 Jul 2024 07:54)      HOSPITAL COURSE:     OVERNIGHT EVENTS:    SUBJECTIVE:     ROS: otherwise negative      T(C): 36.7 (07-04-24 @ 05:22), Max: 36.9 (07-03-24 @ 21:26)  HR: 78 (07-04-24 @ 05:22) (68 - 79)  BP: 109/77 (07-04-24 @ 05:22) (109/77 - 137/81)  RR: 18 (07-04-24 @ 05:22) (18 - 18)  SpO2: 95% (07-04-24 @ 05:22) (95% - 99%)  Wt(kg): --Vital Signs Last 24 Hrs  T(C): 36.7 (04 Jul 2024 05:22), Max: 36.9 (03 Jul 2024 21:26)  T(F): 98 (04 Jul 2024 05:22), Max: 98.5 (03 Jul 2024 21:26)  HR: 78 (04 Jul 2024 05:22) (68 - 79)  BP: 109/77 (04 Jul 2024 05:22) (109/77 - 137/81)  BP(mean): 88 (04 Jul 2024 05:22) (88 - 88)  RR: 18 (04 Jul 2024 05:22) (18 - 18)  SpO2: 95% (04 Jul 2024 05:22) (95% - 99%)    Parameters below as of 04 Jul 2024 05:22  Patient On (Oxygen Delivery Method): room air        PHYSICAL EXAM:  Constitutional: resting comfortably in bed; NAD  Head: NC/AT  Eyes: PERRL, EOMI, anicteric sclera  ENT: no nasal discharge; MMM  Neck: supple; no JVD or thyromegaly  Respiratory: CTA B/L; no W/R/R, no retractions  Cardiac: +S1/S2; RRR; no M/R/G  Gastrointestinal: soft, NT/ND; no rebound or guarding; +BSx4  Back: spine midline, no bony tenderness or step-offs; no CVAT B/L  Extremities: WWP, no clubbing or cyanosis; no peripheral edema. Capillary refill <2 sec  Musculoskeletal: NROM x4; no joint swelling, tenderness or erythema  Vascular: 2+ radial, DP/PT pulses B/L  Dermatologic: skin warm, dry and intact; no rashes, wounds, or scars  Lymphatic: no submandibular or cervical LAD  Neurologic: AAOx3; CNII-XII grossly intact; no focal deficits  Psychiatric: affect and characteristics of appearance, verbalizations, behaviors are appropriate    LABS:                        14.7   8.80  )-----------( 370      ( 04 Jul 2024 05:30 )             44.7     07-04    139  |  102  |  17  ----------------------------<  112<H>  4.1   |  25  |  0.85    Ca    9.3      04 Jul 2024 05:30  Phos  4.5     07-04  Mg     2.2     07-04          Urinalysis Basic - ( 04 Jul 2024 05:30 )    Color: x / Appearance: x / SG: x / pH: x  Gluc: 112 mg/dL / Ketone: x  / Bili: x / Urobili: x   Blood: x / Protein: x / Nitrite: x   Leuk Esterase: x / RBC: x / WBC x   Sq Epi: x / Non Sq Epi: x / Bacteria: x      CAPILLARY BLOOD GLUCOSE            Urinalysis Basic - ( 04 Jul 2024 05:30 )    Color: x / Appearance: x / SG: x / pH: x  Gluc: 112 mg/dL / Ketone: x  / Bili: x / Urobili: x   Blood: x / Protein: x / Nitrite: x   Leuk Esterase: x / RBC: x / WBC x   Sq Epi: x / Non Sq Epi: x / Bacteria: x        MEDICATIONS  (STANDING):  enoxaparin Injectable 40 milliGRAM(s) SubCutaneous every 24 hours  nicotine -   7 mG/24Hr(s) Patch 1 Patch Transdermal daily  pantoprazole    Tablet 40 milliGRAM(s) Oral before breakfast  vancomycin  IVPB 1250 milliGRAM(s) IV Intermittent every 8 hours    MEDICATIONS  (PRN):  acetaminophen     Tablet .. 650 milliGRAM(s) Oral every 6 hours PRN Mild Pain (1 - 3)  aluminum hydroxide/magnesium hydroxide/simethicone Suspension 30 milliLiter(s) Oral every 4 hours PRN Dyspepsia  ibuprofen  Tablet. 400 milliGRAM(s) Oral every 6 hours PRN Moderate Pain (4 - 6)  oxyCODONE    IR 2.5 milliGRAM(s) Oral every 4 hours PRN Severe Pain (7 - 10)      RADIOLOGY & ADDITIONAL TESTS: Reviewed   Patient is a 23y old  Male who presents with a chief complaint of MRSA bacteremia (03 Jul 2024 07:54)      HOSPITAL COURSE: Patient's vanc trough on 7/1 was supratherapeutic and he was switched to vancomycin 1250 mg IV Q8 per pharmacy recommendations. Patient vanc trough is 14.5 on 7/3 within therapeutic rage. Patient will remain on current regimen until 7/19 with plans of disposition to a shelter.     OVERNIGHT EVENTS: Patient had no acute events overnight.     SUBJECTIVE: Patient seen and examined at bedside. Patient endorses feeling well and comfortable. Patient had no complaints or concerns.     ROS: otherwise negative      T(C): 36.7 (07-04-24 @ 05:22), Max: 36.9 (07-03-24 @ 21:26)  HR: 78 (07-04-24 @ 05:22) (68 - 79)  BP: 109/77 (07-04-24 @ 05:22) (109/77 - 137/81)  RR: 18 (07-04-24 @ 05:22) (18 - 18)  SpO2: 95% (07-04-24 @ 05:22) (95% - 99%)  Wt(kg): --Vital Signs Last 24 Hrs  T(C): 36.7 (04 Jul 2024 05:22), Max: 36.9 (03 Jul 2024 21:26)  T(F): 98 (04 Jul 2024 05:22), Max: 98.5 (03 Jul 2024 21:26)  HR: 78 (04 Jul 2024 05:22) (68 - 79)  BP: 109/77 (04 Jul 2024 05:22) (109/77 - 137/81)  BP(mean): 88 (04 Jul 2024 05:22) (88 - 88)  RR: 18 (04 Jul 2024 05:22) (18 - 18)  SpO2: 95% (04 Jul 2024 05:22) (95% - 99%)    Parameters below as of 04 Jul 2024 05:22  Patient On (Oxygen Delivery Method): room air        PHYSICAL EXAM:  Constitutional: resting comfortably in bed; NAD  Head: NC/AT  Eyes: PERRL, EOMI, anicteric sclera  ENT: no nasal discharge; MMM  Neck: supple; no JVD or thyromegaly  Respiratory: CTA B/L; no W/R/R, no retractions  Cardiac: +S1/S2; RRR; no M/R/G  Gastrointestinal: soft, NT/ND; no rebound or guarding; +BSx4  Back: spine midline, no bony tenderness or step-offs; no CVAT B/L  Extremities: WWP, no clubbing or cyanosis; no peripheral edema. Capillary refill <2 sec  Musculoskeletal: NROM x4; no joint swelling, tenderness or erythema  Vascular: 2+ radial, DP/PT pulses B/L  Dermatologic: ulcerated wound above gluteal cleft. no rashes, no scars  Lymphatic: no submandibular or cervical LAD  Neurologic: AAOx3; CNII-XII grossly intact; no focal deficits  Psychiatric: affect and characteristics of appearance, verbalizations, behaviors are appropriate    LABS:                        14.7   8.80  )-----------( 370      ( 04 Jul 2024 05:30 )             44.7     07-04    139  |  102  |  17  ----------------------------<  112<H>  4.1   |  25  |  0.85    Ca    9.3      04 Jul 2024 05:30  Phos  4.5     07-04  Mg     2.2     07-04          Urinalysis Basic - ( 04 Jul 2024 05:30 )    Color: x / Appearance: x / SG: x / pH: x  Gluc: 112 mg/dL / Ketone: x  / Bili: x / Urobili: x   Blood: x / Protein: x / Nitrite: x   Leuk Esterase: x / RBC: x / WBC x   Sq Epi: x / Non Sq Epi: x / Bacteria: x      CAPILLARY BLOOD GLUCOSE            Urinalysis Basic - ( 04 Jul 2024 05:30 )    Color: x / Appearance: x / SG: x / pH: x  Gluc: 112 mg/dL / Ketone: x  / Bili: x / Urobili: x   Blood: x / Protein: x / Nitrite: x   Leuk Esterase: x / RBC: x / WBC x   Sq Epi: x / Non Sq Epi: x / Bacteria: x        MEDICATIONS  (STANDING):  enoxaparin Injectable 40 milliGRAM(s) SubCutaneous every 24 hours  nicotine -   7 mG/24Hr(s) Patch 1 Patch Transdermal daily  pantoprazole    Tablet 40 milliGRAM(s) Oral before breakfast  vancomycin  IVPB 1250 milliGRAM(s) IV Intermittent every 8 hours    MEDICATIONS  (PRN):  acetaminophen     Tablet .. 650 milliGRAM(s) Oral every 6 hours PRN Mild Pain (1 - 3)  aluminum hydroxide/magnesium hydroxide/simethicone Suspension 30 milliLiter(s) Oral every 4 hours PRN Dyspepsia  ibuprofen  Tablet. 400 milliGRAM(s) Oral every 6 hours PRN Moderate Pain (4 - 6)  oxyCODONE    IR 2.5 milliGRAM(s) Oral every 4 hours PRN Severe Pain (7 - 10)      RADIOLOGY & ADDITIONAL TESTS: Reviewed

## 2024-07-05 LAB — VANCOMYCIN TROUGH SERPL-MCNC: 12.5 UG/ML — SIGNIFICANT CHANGE UP (ref 10–20)

## 2024-07-05 PROCEDURE — 99232 SBSQ HOSP IP/OBS MODERATE 35: CPT | Mod: GC

## 2024-07-05 RX ADMIN — NICOTINE POLACRILEX 1 PATCH: 2 LOZENGE ORAL at 11:25

## 2024-07-05 RX ADMIN — PANTOPRAZOLE SODIUM 40 MILLIGRAM(S): 40 INJECTION, POWDER, FOR SOLUTION INTRAVENOUS at 07:05

## 2024-07-05 RX ADMIN — Medication 400 MILLIGRAM(S): at 10:55

## 2024-07-05 RX ADMIN — Medication 30 MILLILITER(S): at 01:32

## 2024-07-05 RX ADMIN — Medication 400 MILLIGRAM(S): at 23:27

## 2024-07-05 RX ADMIN — Medication 30 MILLILITER(S): at 09:55

## 2024-07-05 RX ADMIN — VANCOMYCIN HYDROCHLORIDE 166.67 MILLIGRAM(S): KIT at 17:30

## 2024-07-05 RX ADMIN — Medication 400 MILLIGRAM(S): at 09:55

## 2024-07-05 RX ADMIN — VANCOMYCIN HYDROCHLORIDE 166.67 MILLIGRAM(S): KIT at 01:23

## 2024-07-05 RX ADMIN — ENOXAPARIN SODIUM 40 MILLIGRAM(S): 100 INJECTION SUBCUTANEOUS at 13:34

## 2024-07-05 RX ADMIN — Medication 30 MILLILITER(S): at 23:23

## 2024-07-05 RX ADMIN — VANCOMYCIN HYDROCHLORIDE 166.67 MILLIGRAM(S): KIT at 09:54

## 2024-07-05 RX ADMIN — NICOTINE POLACRILEX 1 PATCH: 2 LOZENGE ORAL at 18:30

## 2024-07-05 NOTE — PROGRESS NOTE ADULT - PROBLEM SELECTOR PLAN 1
sepsis POA (WBC, HR); BCx 6/20 growing MRSA in 2/2 bottles; source likely skin (wound above gluteal cleft, drainage cx growing MRSA; folliculitis of extremities); no e/o abscess on CT L-spine; HIV negative; WBC normalized on vanc; surveillance BCx 6/21 NGTD; no e/o IE on TTE, pretest probability not sufficiently high to warrant AUBREY  -- cont. vanc, dose per level; goal trough 13-18; plan for 4-week course (through 7/19), per ID recs  -- supportive care  -- contact precautions  -- f/u surveillance cultures  -- Vanc trouph 14.5 sepsis POA (WBC, HR); BCx 6/20 growing MRSA in 2/2 bottles; source likely skin (wound above gluteal cleft, drainage cx growing MRSA; folliculitis of extremities); no e/o abscess on CT L-spine; HIV negative; WBC normalized on vanc; surveillance BCx 6/21 NGTD; no e/o IE on TTE, pretest probability not sufficiently high to warrant AUBREY  -- cont. vanc, dose per level; goal trough 13-18; plan for 4-week course (through 7/19), per ID recs  -- supportive care  -- contact precautions  -- f/u surveillance cultures  -- Vanc trouph 14.5 on 7/3 within therapeutic range. continue current dose of Vanc. sepsis POA (WBC, HR); BCx 6/20 growing MRSA in 2/2 bottles; source likely skin (wound above gluteal cleft, drainage cx growing MRSA; folliculitis of extremities); no e/o abscess on CT L-spine; HIV negative; WBC normalized on vanc; surveillance BCx 6/21 NGTD; no e/o IE on TTE, pretest probability not sufficiently high to warrant AUBREY  -- cont. vanc 1250mg q8h, dose per level; goal trough 13-18; plan for 4-week course (through 7/19), per ID recs  -- supportive care  -- contact precautions  -- f/u surveillance cultures  -- Vanc trouph 14.5 on 7/3 within therapeutic range. continue current dose of Vanc.

## 2024-07-05 NOTE — PROGRESS NOTE ADULT - SUBJECTIVE AND OBJECTIVE BOX
Patient is a 23y old  Male who presents with a chief complaint of Lower back abscess (04 Jul 2024 07:03)      HOSPITAL COURSE:     OVERNIGHT EVENTS:    SUBJECTIVE:     ROS: otherwise negative      T(C): 36.8 (07-05-24 @ 05:39), Max: 36.9 (07-04-24 @ 10:29)  HR: 81 (07-05-24 @ 05:39) (62 - 81)  BP: 112/66 (07-05-24 @ 05:39) (111/72 - 120/73)  RR: 16 (07-05-24 @ 05:39) (16 - 18)  SpO2: 96% (07-05-24 @ 05:39) (96% - 98%)  Wt(kg): --Vital Signs Last 24 Hrs  T(C): 36.8 (05 Jul 2024 05:39), Max: 36.9 (04 Jul 2024 10:29)  T(F): 98.2 (05 Jul 2024 05:39), Max: 98.5 (04 Jul 2024 10:29)  HR: 81 (05 Jul 2024 05:39) (62 - 81)  BP: 112/66 (05 Jul 2024 05:39) (111/72 - 120/73)  BP(mean): --  RR: 16 (05 Jul 2024 05:39) (16 - 18)  SpO2: 96% (05 Jul 2024 05:39) (96% - 98%)    Parameters below as of 05 Jul 2024 05:39  Patient On (Oxygen Delivery Method): room air        PHYSICAL EXAM:  Constitutional: resting comfortably in bed; NAD  Head: NC/AT  Eyes: PERRL, EOMI, anicteric sclera  ENT: no nasal discharge; MMM  Neck: supple; no JVD or thyromegaly  Respiratory: CTA B/L; no W/R/R, no retractions  Cardiac: +S1/S2; RRR; no M/R/G  Gastrointestinal: soft, NT/ND; no rebound or guarding; +BSx4  Back: spine midline, no bony tenderness or step-offs; no CVAT B/L  Extremities: WWP, no clubbing or cyanosis; no peripheral edema. Capillary refill <2 sec  Musculoskeletal: NROM x4; no joint swelling, tenderness or erythema  Vascular: 2+ radial, DP/PT pulses B/L  Dermatologic: skin warm, dry and intact; no rashes, wounds, or scars  Lymphatic: no submandibular or cervical LAD  Neurologic: AAOx3; CNII-XII grossly intact; no focal deficits  Psychiatric: affect and characteristics of appearance, verbalizations, behaviors are appropriate    LABS:                        14.7   8.80  )-----------( 370      ( 04 Jul 2024 05:30 )             44.7     07-04    139  |  102  |  17  ----------------------------<  112<H>  4.1   |  25  |  0.85    Ca    9.3      04 Jul 2024 05:30  Phos  4.5     07-04  Mg     2.2     07-04          Urinalysis Basic - ( 04 Jul 2024 05:30 )    Color: x / Appearance: x / SG: x / pH: x  Gluc: 112 mg/dL / Ketone: x  / Bili: x / Urobili: x   Blood: x / Protein: x / Nitrite: x   Leuk Esterase: x / RBC: x / WBC x   Sq Epi: x / Non Sq Epi: x / Bacteria: x      CAPILLARY BLOOD GLUCOSE            Urinalysis Basic - ( 04 Jul 2024 05:30 )    Color: x / Appearance: x / SG: x / pH: x  Gluc: 112 mg/dL / Ketone: x  / Bili: x / Urobili: x   Blood: x / Protein: x / Nitrite: x   Leuk Esterase: x / RBC: x / WBC x   Sq Epi: x / Non Sq Epi: x / Bacteria: x        MEDICATIONS  (STANDING):  enoxaparin Injectable 40 milliGRAM(s) SubCutaneous every 24 hours  nicotine -   7 mG/24Hr(s) Patch 1 Patch Transdermal daily  pantoprazole    Tablet 40 milliGRAM(s) Oral before breakfast  vancomycin  IVPB 1250 milliGRAM(s) IV Intermittent every 8 hours    MEDICATIONS  (PRN):  acetaminophen     Tablet .. 650 milliGRAM(s) Oral every 6 hours PRN Mild Pain (1 - 3)  aluminum hydroxide/magnesium hydroxide/simethicone Suspension 30 milliLiter(s) Oral every 4 hours PRN Dyspepsia  ibuprofen  Tablet. 400 milliGRAM(s) Oral every 6 hours PRN Moderate Pain (4 - 6)      RADIOLOGY & ADDITIONAL TESTS: Reviewed   Patient is a 23y old  Male who presents with a chief complaint of Lower back abscess (04 Jul 2024 07:03)      HOSPITAL COURSE: Patient's vanc trough on 7/1 was supratherapeutic and he was switched to vancomycin 1250 mg IV Q8 per pharmacy recommendations. Patient vanc trough is 14.5 on 7/3 within therapeutic rage. Patient will remain on current regimen until 7/19 with plans of disposition to a shelter.     OVERNIGHT EVENTS: Patient had no overnight events.    SUBJECTIVE: Patient seen and examined at bedside. Patient denied having any pain since discontinuing his oxycodone yesterday. Patient reports feeling well and being comfortable.     ROS: otherwise negative      T(C): 36.8 (07-05-24 @ 05:39), Max: 36.9 (07-04-24 @ 10:29)  HR: 81 (07-05-24 @ 05:39) (62 - 81)  BP: 112/66 (07-05-24 @ 05:39) (111/72 - 120/73)  RR: 16 (07-05-24 @ 05:39) (16 - 18)  SpO2: 96% (07-05-24 @ 05:39) (96% - 98%)  Wt(kg): --Vital Signs Last 24 Hrs  T(C): 36.8 (05 Jul 2024 05:39), Max: 36.9 (04 Jul 2024 10:29)  T(F): 98.2 (05 Jul 2024 05:39), Max: 98.5 (04 Jul 2024 10:29)  HR: 81 (05 Jul 2024 05:39) (62 - 81)  BP: 112/66 (05 Jul 2024 05:39) (111/72 - 120/73)  BP(mean): --  RR: 16 (05 Jul 2024 05:39) (16 - 18)  SpO2: 96% (05 Jul 2024 05:39) (96% - 98%)    Parameters below as of 05 Jul 2024 05:39  Patient On (Oxygen Delivery Method): room air    PHYSICAL EXAM:  Constitutional: resting comfortably in bed; NAD  Head: NC/AT  Eyes: PERRL, EOMI, anicteric sclera  ENT: no nasal discharge; MMM  Neck: supple; no JVD or thyromegaly  Respiratory: CTA B/L; no W/R/R, no retractions  Cardiac: +S1/S2; RRR; no M/R/G  Gastrointestinal: soft, NT/ND; no rebound or guarding; +BSx4  Back: spine midline, no bony tenderness or step-offs; no CVAT B/L  Extremities: WWP, no clubbing or cyanosis; no peripheral edema. Capillary refill <2 sec  Musculoskeletal: NROM x4; no joint swelling, tenderness or erythema  Vascular: 2+ radial, DP/PT pulses B/L  Dermatologic: ulcerated wound above gluteal cleft. no rashes, no scars  Lymphatic: no submandibular or cervical LAD  Neurologic: AAOx3; CNII-XII grossly intact; no focal deficits  Psychiatric: affect and characteristics of appearance, verbalizations, behaviors are appropriate      LABS:                        14.7   8.80  )-----------( 370      ( 04 Jul 2024 05:30 )             44.7     07-04    139  |  102  |  17  ----------------------------<  112<H>  4.1   |  25  |  0.85    Ca    9.3      04 Jul 2024 05:30  Phos  4.5     07-04  Mg     2.2     07-04          Urinalysis Basic - ( 04 Jul 2024 05:30 )    Color: x / Appearance: x / SG: x / pH: x  Gluc: 112 mg/dL / Ketone: x  / Bili: x / Urobili: x   Blood: x / Protein: x / Nitrite: x   Leuk Esterase: x / RBC: x / WBC x   Sq Epi: x / Non Sq Epi: x / Bacteria: x      CAPILLARY BLOOD GLUCOSE            Urinalysis Basic - ( 04 Jul 2024 05:30 )    Color: x / Appearance: x / SG: x / pH: x  Gluc: 112 mg/dL / Ketone: x  / Bili: x / Urobili: x   Blood: x / Protein: x / Nitrite: x   Leuk Esterase: x / RBC: x / WBC x   Sq Epi: x / Non Sq Epi: x / Bacteria: x        MEDICATIONS  (STANDING):  enoxaparin Injectable 40 milliGRAM(s) SubCutaneous every 24 hours  nicotine -   7 mG/24Hr(s) Patch 1 Patch Transdermal daily  pantoprazole    Tablet 40 milliGRAM(s) Oral before breakfast  vancomycin  IVPB 1250 milliGRAM(s) IV Intermittent every 8 hours    MEDICATIONS  (PRN):  acetaminophen     Tablet .. 650 milliGRAM(s) Oral every 6 hours PRN Mild Pain (1 - 3)  aluminum hydroxide/magnesium hydroxide/simethicone Suspension 30 milliLiter(s) Oral every 4 hours PRN Dyspepsia  ibuprofen  Tablet. 400 milliGRAM(s) Oral every 6 hours PRN Moderate Pain (4 - 6)      RADIOLOGY & ADDITIONAL TESTS: Reviewed

## 2024-07-06 PROCEDURE — 99232 SBSQ HOSP IP/OBS MODERATE 35: CPT | Mod: GC

## 2024-07-06 RX ORDER — MAGNESIUM, ALUMINUM HYDROXIDE 400-400
30 TABLET,CHEWABLE ORAL EVERY 6 HOURS
Refills: 0 | Status: COMPLETED | OUTPATIENT
Start: 2024-07-06 | End: 2024-07-08

## 2024-07-06 RX ADMIN — VANCOMYCIN HYDROCHLORIDE 166.67 MILLIGRAM(S): KIT at 09:14

## 2024-07-06 RX ADMIN — NICOTINE POLACRILEX 1 PATCH: 2 LOZENGE ORAL at 13:54

## 2024-07-06 RX ADMIN — Medication 400 MILLIGRAM(S): at 11:05

## 2024-07-06 RX ADMIN — NICOTINE POLACRILEX 1 PATCH: 2 LOZENGE ORAL at 13:16

## 2024-07-06 RX ADMIN — NICOTINE POLACRILEX 1 PATCH: 2 LOZENGE ORAL at 06:47

## 2024-07-06 RX ADMIN — ENOXAPARIN SODIUM 40 MILLIGRAM(S): 100 INJECTION SUBCUTANEOUS at 13:54

## 2024-07-06 RX ADMIN — Medication 650 MILLIGRAM(S): at 22:23

## 2024-07-06 RX ADMIN — VANCOMYCIN HYDROCHLORIDE 166.67 MILLIGRAM(S): KIT at 01:04

## 2024-07-06 RX ADMIN — Medication 30 MILLILITER(S): at 15:52

## 2024-07-06 RX ADMIN — Medication 650 MILLIGRAM(S): at 23:23

## 2024-07-06 RX ADMIN — PANTOPRAZOLE SODIUM 40 MILLIGRAM(S): 40 INJECTION, POWDER, FOR SOLUTION INTRAVENOUS at 06:35

## 2024-07-06 RX ADMIN — NICOTINE POLACRILEX 1 PATCH: 2 LOZENGE ORAL at 18:41

## 2024-07-06 RX ADMIN — VANCOMYCIN HYDROCHLORIDE 166.67 MILLIGRAM(S): KIT at 17:55

## 2024-07-06 RX ADMIN — Medication 400 MILLIGRAM(S): at 00:28

## 2024-07-06 RX ADMIN — Medication 400 MILLIGRAM(S): at 10:05

## 2024-07-06 RX ADMIN — Medication 30 MILLILITER(S): at 22:56

## 2024-07-06 NOTE — PROGRESS NOTE ADULT - ASSESSMENT
23 year old man with hx of Drug abuse ( crack cocaine and meth )  , admitted to the hospital with Lower back cellulitis and MRSA bacteremia     Impression and Plan  # MRSA Bacteremia   - source - back cellulitis   - Blood cx negative since 6/21 , TTE without evidence of Endocarditis and given easily identifiable source , quick resolution of bacteremia and no other potential hardware for seeding he had a low pre test probability for Endocarditis and AUBREY is not warranted at this time   - Per ID 4 weeks of IV vancomycin , End Date 7/19/24     # Polysubstance abuse   - not in withdrawal     # GERD   - Continue PPI     # Hypokalemia   # Hypophosphatemia   - resolved after treatment     # Lovenox for DVT prophylaxis

## 2024-07-06 NOTE — PROGRESS NOTE ADULT - SUBJECTIVE AND OBJECTIVE BOX
Patient is a 23y old  Male who presents with a chief complaint of Lower back abscess (05 Jul 2024 06:49)        SUBJECTIVE:  Patient was seen and examined at bedside.    Overnight Events : resting in bed , denies pain , tolerating diet , no new complaints       Review of systems: 12 point Review of systems negative unless otherwise documented elsewhere in note.     Diet, Regular (06-21-24 @ 01:58) [Active]      MEDICATIONS:  MEDICATIONS  (STANDING):  enoxaparin Injectable 40 milliGRAM(s) SubCutaneous every 24 hours  nicotine -   7 mG/24Hr(s) Patch 1 Patch Transdermal daily  pantoprazole    Tablet 40 milliGRAM(s) Oral before breakfast  vancomycin  IVPB 1250 milliGRAM(s) IV Intermittent every 8 hours    MEDICATIONS  (PRN):  acetaminophen     Tablet .. 650 milliGRAM(s) Oral every 6 hours PRN Mild Pain (1 - 3)  ibuprofen  Tablet. 400 milliGRAM(s) Oral every 6 hours PRN Moderate Pain (4 - 6)      Allergies    No Known Allergies    Intolerances        OBJECTIVE:  Vital Signs Last 24 Hrs  T(C): 36.4 (06 Jul 2024 06:10), Max: 36.8 (05 Jul 2024 20:49)  T(F): 97.5 (06 Jul 2024 06:10), Max: 98.2 (05 Jul 2024 20:49)  HR: 66 (06 Jul 2024 06:10) (66 - 102)  BP: 114/66 (06 Jul 2024 06:10) (110/71 - 136/87)  BP(mean): --  RR: 17 (06 Jul 2024 06:10) (16 - 17)  SpO2: 96% (06 Jul 2024 06:10) (96% - 99%)    Parameters below as of 05 Jul 2024 20:49  Patient On (Oxygen Delivery Method): room air      I&O's Summary    05 Jul 2024 07:01  -  06 Jul 2024 07:00  --------------------------------------------------------  IN: 750 mL / OUT: 0 mL / NET: 750 mL        PHYSICAL EXAM:  Gen: Resting in bed at time of exam, not in distress   HEENT: moist mucosa, no lesions   Neck: supple, trachea at midline  CV: RRR, +S1/S2  Pulm: no wheezing , no crackles  no increase in work of breathing  Abd: soft, NTND  Skin: warm and dry, no new rashes   Ext: moving all 4 extremities spontaneously , no edema  ,  Neuro: AOx3, no gross focal neurological deficits  Psych: affect and behavior appropriate, pleasant at time of interview    LABS:

## 2024-07-07 LAB
ALBUMIN SERPL ELPH-MCNC: 4 G/DL — SIGNIFICANT CHANGE UP (ref 3.3–5)
ALP SERPL-CCNC: 81 U/L — SIGNIFICANT CHANGE UP (ref 40–120)
ALT FLD-CCNC: 35 U/L — SIGNIFICANT CHANGE UP (ref 10–45)
ANION GAP SERPL CALC-SCNC: 12 MMOL/L — SIGNIFICANT CHANGE UP (ref 5–17)
AST SERPL-CCNC: 22 U/L — SIGNIFICANT CHANGE UP (ref 10–40)
BASOPHILS # BLD AUTO: 0.06 K/UL — SIGNIFICANT CHANGE UP (ref 0–0.2)
BASOPHILS NFR BLD AUTO: 0.9 % — SIGNIFICANT CHANGE UP (ref 0–2)
BILIRUB SERPL-MCNC: 0.3 MG/DL — SIGNIFICANT CHANGE UP (ref 0.2–1.2)
BUN SERPL-MCNC: 16 MG/DL — SIGNIFICANT CHANGE UP (ref 7–23)
CALCIUM SERPL-MCNC: 9.6 MG/DL — SIGNIFICANT CHANGE UP (ref 8.4–10.5)
CHLORIDE SERPL-SCNC: 100 MMOL/L — SIGNIFICANT CHANGE UP (ref 96–108)
CO2 SERPL-SCNC: 27 MMOL/L — SIGNIFICANT CHANGE UP (ref 22–31)
CREAT SERPL-MCNC: 0.94 MG/DL — SIGNIFICANT CHANGE UP (ref 0.5–1.3)
EGFR: 115 ML/MIN/1.73M2 — SIGNIFICANT CHANGE UP
EOSINOPHIL # BLD AUTO: 0.26 K/UL — SIGNIFICANT CHANGE UP (ref 0–0.5)
EOSINOPHIL NFR BLD AUTO: 3.8 % — SIGNIFICANT CHANGE UP (ref 0–6)
GLUCOSE SERPL-MCNC: 103 MG/DL — HIGH (ref 70–99)
HCT VFR BLD CALC: 46.1 % — SIGNIFICANT CHANGE UP (ref 39–50)
HGB BLD-MCNC: 15.5 G/DL — SIGNIFICANT CHANGE UP (ref 13–17)
IMM GRANULOCYTES NFR BLD AUTO: 0.3 % — SIGNIFICANT CHANGE UP (ref 0–0.9)
LYMPHOCYTES # BLD AUTO: 1.93 K/UL — SIGNIFICANT CHANGE UP (ref 1–3.3)
LYMPHOCYTES # BLD AUTO: 28.6 % — SIGNIFICANT CHANGE UP (ref 13–44)
MAGNESIUM SERPL-MCNC: 2 MG/DL — SIGNIFICANT CHANGE UP (ref 1.6–2.6)
MCHC RBC-ENTMCNC: 31.1 PG — SIGNIFICANT CHANGE UP (ref 27–34)
MCHC RBC-ENTMCNC: 33.6 GM/DL — SIGNIFICANT CHANGE UP (ref 32–36)
MCV RBC AUTO: 92.4 FL — SIGNIFICANT CHANGE UP (ref 80–100)
MONOCYTES # BLD AUTO: 1.05 K/UL — HIGH (ref 0–0.9)
MONOCYTES NFR BLD AUTO: 15.5 % — HIGH (ref 2–14)
NEUTROPHILS # BLD AUTO: 3.44 K/UL — SIGNIFICANT CHANGE UP (ref 1.8–7.4)
NEUTROPHILS NFR BLD AUTO: 50.9 % — SIGNIFICANT CHANGE UP (ref 43–77)
NRBC # BLD: 0 /100 WBCS — SIGNIFICANT CHANGE UP (ref 0–0)
PHOSPHATE SERPL-MCNC: 4.2 MG/DL — SIGNIFICANT CHANGE UP (ref 2.5–4.5)
PLATELET # BLD AUTO: 302 K/UL — SIGNIFICANT CHANGE UP (ref 150–400)
POTASSIUM SERPL-MCNC: 4.2 MMOL/L — SIGNIFICANT CHANGE UP (ref 3.5–5.3)
POTASSIUM SERPL-SCNC: 4.2 MMOL/L — SIGNIFICANT CHANGE UP (ref 3.5–5.3)
PROT SERPL-MCNC: 8 G/DL — SIGNIFICANT CHANGE UP (ref 6–8.3)
RBC # BLD: 4.99 M/UL — SIGNIFICANT CHANGE UP (ref 4.2–5.8)
RBC # FLD: 12.5 % — SIGNIFICANT CHANGE UP (ref 10.3–14.5)
SODIUM SERPL-SCNC: 139 MMOL/L — SIGNIFICANT CHANGE UP (ref 135–145)
VANCOMYCIN TROUGH SERPL-MCNC: 14.1 UG/ML — SIGNIFICANT CHANGE UP (ref 10–20)
WBC # BLD: 6.76 K/UL — SIGNIFICANT CHANGE UP (ref 3.8–10.5)
WBC # FLD AUTO: 6.76 K/UL — SIGNIFICANT CHANGE UP (ref 3.8–10.5)

## 2024-07-07 PROCEDURE — 99233 SBSQ HOSP IP/OBS HIGH 50: CPT | Mod: GC

## 2024-07-07 RX ADMIN — NICOTINE POLACRILEX 1 PATCH: 2 LOZENGE ORAL at 13:11

## 2024-07-07 RX ADMIN — VANCOMYCIN HYDROCHLORIDE 166.67 MILLIGRAM(S): KIT at 18:32

## 2024-07-07 RX ADMIN — Medication 400 MILLIGRAM(S): at 19:11

## 2024-07-07 RX ADMIN — NICOTINE POLACRILEX 1 PATCH: 2 LOZENGE ORAL at 17:25

## 2024-07-07 RX ADMIN — Medication 30 MILLILITER(S): at 18:32

## 2024-07-07 RX ADMIN — VANCOMYCIN HYDROCHLORIDE 166.67 MILLIGRAM(S): KIT at 09:46

## 2024-07-07 RX ADMIN — VANCOMYCIN HYDROCHLORIDE 166.67 MILLIGRAM(S): KIT at 02:03

## 2024-07-07 RX ADMIN — Medication 400 MILLIGRAM(S): at 18:32

## 2024-07-07 RX ADMIN — ENOXAPARIN SODIUM 40 MILLIGRAM(S): 100 INJECTION SUBCUTANEOUS at 13:11

## 2024-07-07 RX ADMIN — PANTOPRAZOLE SODIUM 40 MILLIGRAM(S): 40 INJECTION, POWDER, FOR SOLUTION INTRAVENOUS at 07:26

## 2024-07-07 NOTE — PROGRESS NOTE ADULT - SUBJECTIVE AND OBJECTIVE BOX
Patient is a 23y old  Male who presents with a chief complaint of Bacteremia (06 Jul 2024 10:45)      HOSPITAL COURSE:     OVERNIGHT EVENTS:    SUBJECTIVE:     ROS: otherwise negative      T(C): 36.4 (07-07-24 @ 05:16), Max: 36.7 (07-06-24 @ 13:40)  HR: 67 (07-07-24 @ 05:16) (67 - 86)  BP: 115/78 (07-07-24 @ 05:16) (107/74 - 128/70)  RR: 17 (07-07-24 @ 05:16) (16 - 18)  SpO2: 97% (07-07-24 @ 05:16) (97% - 99%)  Wt(kg): --Vital Signs Last 24 Hrs  T(C): 36.4 (07 Jul 2024 05:16), Max: 36.7 (06 Jul 2024 13:40)  T(F): 97.5 (07 Jul 2024 05:16), Max: 98 (06 Jul 2024 13:40)  HR: 67 (07 Jul 2024 05:16) (67 - 86)  BP: 115/78 (07 Jul 2024 05:16) (107/74 - 128/70)  BP(mean): 83 (06 Jul 2024 21:07) (83 - 83)  RR: 17 (07 Jul 2024 05:16) (16 - 18)  SpO2: 97% (07 Jul 2024 05:16) (97% - 99%)    Parameters below as of 06 Jul 2024 21:07  Patient On (Oxygen Delivery Method): room air        PHYSICAL EXAM:  Constitutional: resting comfortably in bed; NAD  Head: NC/AT  Eyes: PERRL, EOMI, anicteric sclera  ENT: no nasal discharge; MMM  Neck: supple; no JVD or thyromegaly  Respiratory: CTA B/L; no W/R/R, no retractions  Cardiac: +S1/S2; RRR; no M/R/G  Gastrointestinal: soft, NT/ND; no rebound or guarding; +BSx4  Back: spine midline, no bony tenderness or step-offs; no CVAT B/L  Extremities: WWP, no clubbing or cyanosis; no peripheral edema. Capillary refill <2 sec  Musculoskeletal: NROM x4; no joint swelling, tenderness or erythema  Vascular: 2+ radial, DP/PT pulses B/L  Dermatologic: skin warm, dry and intact; no rashes, wounds, or scars  Lymphatic: no submandibular or cervical LAD  Neurologic: AAOx3; CNII-XII grossly intact; no focal deficits  Psychiatric: affect and characteristics of appearance, verbalizations, behaviors are appropriate    LABS:                CAPILLARY BLOOD GLUCOSE                MEDICATIONS  (STANDING):  enoxaparin Injectable 40 milliGRAM(s) SubCutaneous every 24 hours  nicotine -   7 mG/24Hr(s) Patch 1 Patch Transdermal daily  pantoprazole    Tablet 40 milliGRAM(s) Oral before breakfast  vancomycin  IVPB 1250 milliGRAM(s) IV Intermittent every 8 hours    MEDICATIONS  (PRN):  acetaminophen     Tablet .. 650 milliGRAM(s) Oral every 6 hours PRN Mild Pain (1 - 3)  aluminum hydroxide/magnesium hydroxide/simethicone Suspension 30 milliLiter(s) Oral every 6 hours PRN Dyspepsia  ibuprofen  Tablet. 400 milliGRAM(s) Oral every 6 hours PRN Moderate Pain (4 - 6)      RADIOLOGY & ADDITIONAL TESTS: Reviewed   Patient is a 23y old  Male who presents with a chief complaint of Bacteremia (06 Jul 2024 10:45)      HOSPITAL COURSE: HOSPITAL COURSE: Patient's vanc trough on 7/1 was supratherapeutic and he was switched to vancomycin 1250 mg IV Q8 per pharmacy recommendations. Patient vanc trough is 14.5 on 7/3 within therapeutic rage. Patient will remain on current regimen until 7/19 with plans of disposition to a shelter. Patient     OVERNIGHT EVENTS: Patient had no acute events overnight.    SUBJECTIVE:     ROS: otherwise negative      T(C): 36.4 (07-07-24 @ 05:16), Max: 36.7 (07-06-24 @ 13:40)  HR: 67 (07-07-24 @ 05:16) (67 - 86)  BP: 115/78 (07-07-24 @ 05:16) (107/74 - 128/70)  RR: 17 (07-07-24 @ 05:16) (16 - 18)  SpO2: 97% (07-07-24 @ 05:16) (97% - 99%)  Wt(kg): --Vital Signs Last 24 Hrs  T(C): 36.4 (07 Jul 2024 05:16), Max: 36.7 (06 Jul 2024 13:40)  T(F): 97.5 (07 Jul 2024 05:16), Max: 98 (06 Jul 2024 13:40)  HR: 67 (07 Jul 2024 05:16) (67 - 86)  BP: 115/78 (07 Jul 2024 05:16) (107/74 - 128/70)  BP(mean): 83 (06 Jul 2024 21:07) (83 - 83)  RR: 17 (07 Jul 2024 05:16) (16 - 18)  SpO2: 97% (07 Jul 2024 05:16) (97% - 99%)    Parameters below as of 06 Jul 2024 21:07  Patient On (Oxygen Delivery Method): room air        PHYSICAL EXAM:  Constitutional: resting comfortably in bed; NAD  Head: NC/AT  Eyes: PERRL, EOMI, anicteric sclera  ENT: no nasal discharge; MMM  Neck: supple; no JVD or thyromegaly  Respiratory: CTA B/L; no W/R/R, no retractions  Cardiac: +S1/S2; RRR; no M/R/G  Gastrointestinal: soft, NT/ND; no rebound or guarding; +BSx4  Back: spine midline, no bony tenderness or step-offs; no CVAT B/L  Extremities: WWP, no clubbing or cyanosis; no peripheral edema. Capillary refill <2 sec  Musculoskeletal: NROM x4; no joint swelling, tenderness or erythema  Vascular: 2+ radial, DP/PT pulses B/L  Dermatologic: ulcerated wound above gluteal cleft. no rashes, no scars  Lymphatic: no submandibular or cervical LAD  Neurologic: AAOx3; CNII-XII grossly intact; no focal deficits  Psychiatric: affect and characteristics of appearance, verbalizations, behaviors are appropriate        LABS:                CAPILLARY BLOOD GLUCOSE                MEDICATIONS  (STANDING):  enoxaparin Injectable 40 milliGRAM(s) SubCutaneous every 24 hours  nicotine -   7 mG/24Hr(s) Patch 1 Patch Transdermal daily  pantoprazole    Tablet 40 milliGRAM(s) Oral before breakfast  vancomycin  IVPB 1250 milliGRAM(s) IV Intermittent every 8 hours    MEDICATIONS  (PRN):  acetaminophen     Tablet .. 650 milliGRAM(s) Oral every 6 hours PRN Mild Pain (1 - 3)  aluminum hydroxide/magnesium hydroxide/simethicone Suspension 30 milliLiter(s) Oral every 6 hours PRN Dyspepsia  ibuprofen  Tablet. 400 milliGRAM(s) Oral every 6 hours PRN Moderate Pain (4 - 6)      RADIOLOGY & ADDITIONAL TESTS: Reviewed   Patient is a 23y old  Male who presents with a chief complaint of Bacteremia (06 Jul 2024 10:45)      HOSPITAL COURSE: HOSPITAL COURSE: Patient's vanc trough on 7/1 was supratherapeutic and he was switched to vancomycin 1250 mg IV Q8 per pharmacy recommendations. Patient vanc trough is 14.5 on 7/3 within therapeutic rage. Patient will remain on current regimen until 7/19 with plans of disposition to a shelter. Patient's 7/7 vanc trouph pending.     OVERNIGHT EVENTS: Patient had no acute events overnight.    SUBJECTIVE: Patient seen and examined at bedside. Patient reported feeling well. Patient denied having any pain since dc his oxycodone Patient also denied had no abdominal pain, nausea or vomiting. Patient had no complaints or concerns today.    ROS: otherwise negative      T(C): 36.4 (07-07-24 @ 05:16), Max: 36.7 (07-06-24 @ 13:40)  HR: 67 (07-07-24 @ 05:16) (67 - 86)  BP: 115/78 (07-07-24 @ 05:16) (107/74 - 128/70)  RR: 17 (07-07-24 @ 05:16) (16 - 18)  SpO2: 97% (07-07-24 @ 05:16) (97% - 99%)  Wt(kg): --Vital Signs Last 24 Hrs  T(C): 36.4 (07 Jul 2024 05:16), Max: 36.7 (06 Jul 2024 13:40)  T(F): 97.5 (07 Jul 2024 05:16), Max: 98 (06 Jul 2024 13:40)  HR: 67 (07 Jul 2024 05:16) (67 - 86)  BP: 115/78 (07 Jul 2024 05:16) (107/74 - 128/70)  BP(mean): 83 (06 Jul 2024 21:07) (83 - 83)  RR: 17 (07 Jul 2024 05:16) (16 - 18)  SpO2: 97% (07 Jul 2024 05:16) (97% - 99%)    Parameters below as of 06 Jul 2024 21:07  Patient On (Oxygen Delivery Method): room air        PHYSICAL EXAM:  Constitutional: resting comfortably in bed; NAD  Head: NC/AT  Eyes: PERRL, EOMI, anicteric sclera  ENT: no nasal discharge; MMM  Neck: supple; no JVD or thyromegaly  Respiratory: CTA B/L; no W/R/R, no retractions  Cardiac: +S1/S2; RRR; no M/R/G  Gastrointestinal: soft, NT/ND; no rebound or guarding; +BSx4  Back: spine midline, no bony tenderness or step-offs; no CVAT B/L  Extremities: WWP, no clubbing or cyanosis; no peripheral edema. Capillary refill <2 sec  Musculoskeletal: NROM x4; no joint swelling, tenderness or erythema  Vascular: 2+ radial, DP/PT pulses B/L  Dermatologic: ulcerated wound above gluteal cleft. no rashes, no scars  Lymphatic: no submandibular or cervical LAD  Neurologic: AAOx3; CNII-XII grossly intact; no focal deficits  Psychiatric: affect and characteristics of appearance, verbalizations, behaviors are appropriate        LABS:                CAPILLARY BLOOD GLUCOSE                MEDICATIONS  (STANDING):  enoxaparin Injectable 40 milliGRAM(s) SubCutaneous every 24 hours  nicotine -   7 mG/24Hr(s) Patch 1 Patch Transdermal daily  pantoprazole    Tablet 40 milliGRAM(s) Oral before breakfast  vancomycin  IVPB 1250 milliGRAM(s) IV Intermittent every 8 hours    MEDICATIONS  (PRN):  acetaminophen     Tablet .. 650 milliGRAM(s) Oral every 6 hours PRN Mild Pain (1 - 3)  aluminum hydroxide/magnesium hydroxide/simethicone Suspension 30 milliLiter(s) Oral every 6 hours PRN Dyspepsia  ibuprofen  Tablet. 400 milliGRAM(s) Oral every 6 hours PRN Moderate Pain (4 - 6)      RADIOLOGY & ADDITIONAL TESTS: Reviewed   Patient is a 23y old  Male who presents with a chief complaint of Bacteremia (06 Jul 2024 10:45)      HOSPITAL COURSE: HOSPITAL COURSE: Patient's vanc trough on 7/1 was supratherapeutic and he was switched to vancomycin 1250 mg IV Q8 per pharmacy recommendations. Patient vanc trough is 14.5 on 7/3 within therapeutic rage. Patient will remain on current regimen until 7/19 with plans of disposition to a shelter. Vanc trough and labs pending     OVERNIGHT EVENTS: Patient had no acute events overnight.    SUBJECTIVE: Patient seen and examined at bedside. Patient reported feeling well. Patient denied having any pain since dc his oxycodone Patient also denied had no abdominal pain, nausea or vomiting. Patient had no complaints or concerns today.    ROS: otherwise negative      T(C): 36.4 (07-07-24 @ 05:16), Max: 36.7 (07-06-24 @ 13:40)  HR: 67 (07-07-24 @ 05:16) (67 - 86)  BP: 115/78 (07-07-24 @ 05:16) (107/74 - 128/70)  RR: 17 (07-07-24 @ 05:16) (16 - 18)  SpO2: 97% (07-07-24 @ 05:16) (97% - 99%)  Wt(kg): --Vital Signs Last 24 Hrs  T(C): 36.4 (07 Jul 2024 05:16), Max: 36.7 (06 Jul 2024 13:40)  T(F): 97.5 (07 Jul 2024 05:16), Max: 98 (06 Jul 2024 13:40)  HR: 67 (07 Jul 2024 05:16) (67 - 86)  BP: 115/78 (07 Jul 2024 05:16) (107/74 - 128/70)  BP(mean): 83 (06 Jul 2024 21:07) (83 - 83)  RR: 17 (07 Jul 2024 05:16) (16 - 18)  SpO2: 97% (07 Jul 2024 05:16) (97% - 99%)    Parameters below as of 06 Jul 2024 21:07  Patient On (Oxygen Delivery Method): room air        PHYSICAL EXAM:  Constitutional: resting comfortably in bed; NAD  Head: NC/AT  Eyes: PERRL, EOMI, anicteric sclera  ENT: no nasal discharge; MMM  Neck: supple; no JVD or thyromegaly  Respiratory: CTA B/L; no W/R/R, no retractions  Cardiac: +S1/S2; RRR; no M/R/G  Gastrointestinal: soft, NT/ND; no rebound or guarding; +BSx4  Back: spine midline, no bony tenderness or step-offs; no CVAT B/L  Extremities: WWP, no clubbing or cyanosis; no peripheral edema. Capillary refill <2 sec  Musculoskeletal: NROM x4; no joint swelling, tenderness or erythema  Vascular: 2+ radial, DP/PT pulses B/L  Dermatologic: ulcerated wound above gluteal cleft. no rashes, no scars  Lymphatic: no submandibular or cervical LAD  Neurologic: AAOx3; CNII-XII grossly intact; no focal deficits  Psychiatric: affect and characteristics of appearance, verbalizations, behaviors are appropriate        LABS:                CAPILLARY BLOOD GLUCOSE                MEDICATIONS  (STANDING):  enoxaparin Injectable 40 milliGRAM(s) SubCutaneous every 24 hours  nicotine -   7 mG/24Hr(s) Patch 1 Patch Transdermal daily  pantoprazole    Tablet 40 milliGRAM(s) Oral before breakfast  vancomycin  IVPB 1250 milliGRAM(s) IV Intermittent every 8 hours    MEDICATIONS  (PRN):  acetaminophen     Tablet .. 650 milliGRAM(s) Oral every 6 hours PRN Mild Pain (1 - 3)  aluminum hydroxide/magnesium hydroxide/simethicone Suspension 30 milliLiter(s) Oral every 6 hours PRN Dyspepsia  ibuprofen  Tablet. 400 milliGRAM(s) Oral every 6 hours PRN Moderate Pain (4 - 6)      RADIOLOGY & ADDITIONAL TESTS: Reviewed   Patient is a 23y old  Male who presents with a chief complaint of Bacteremia (06 Jul 2024 10:45)      HOSPITAL COURSE: HOSPITAL COURSE: Patient's vanc trough on 7/1 was supratherapeutic and he was switched to vancomycin 1250 mg IV Q8 per pharmacy recommendations. Patient vanc trough is 14.5 on 7/3 within therapeutic rage. Patient will remain on current regimen until 7/19 with plans of disposition to a shelter. Vanc trough and labs pending     OVERNIGHT EVENTS: Patient had no acute events overnight.    SUBJECTIVE: Patient seen and examined at bedside. Patient reported feeling well. Patient denied having any pain since dc his oxycodone. Patient also denied having abdominal pain, nausea or vomiting. Patient had no complaints or concerns today.    ROS: otherwise negative      T(C): 36.4 (07-07-24 @ 05:16), Max: 36.7 (07-06-24 @ 13:40)  HR: 67 (07-07-24 @ 05:16) (67 - 86)  BP: 115/78 (07-07-24 @ 05:16) (107/74 - 128/70)  RR: 17 (07-07-24 @ 05:16) (16 - 18)  SpO2: 97% (07-07-24 @ 05:16) (97% - 99%)  Wt(kg): --Vital Signs Last 24 Hrs  T(C): 36.4 (07 Jul 2024 05:16), Max: 36.7 (06 Jul 2024 13:40)  T(F): 97.5 (07 Jul 2024 05:16), Max: 98 (06 Jul 2024 13:40)  HR: 67 (07 Jul 2024 05:16) (67 - 86)  BP: 115/78 (07 Jul 2024 05:16) (107/74 - 128/70)  BP(mean): 83 (06 Jul 2024 21:07) (83 - 83)  RR: 17 (07 Jul 2024 05:16) (16 - 18)  SpO2: 97% (07 Jul 2024 05:16) (97% - 99%)    Parameters below as of 06 Jul 2024 21:07  Patient On (Oxygen Delivery Method): room air        PHYSICAL EXAM:  Constitutional: resting comfortably in bed; NAD  Head: NC/AT  Eyes: PERRL, EOMI, anicteric sclera  ENT: no nasal discharge; MMM  Neck: supple; no JVD or thyromegaly  Respiratory: CTA B/L; no W/R/R, no retractions  Cardiac: +S1/S2; RRR; no M/R/G  Gastrointestinal: soft, NT/ND; no rebound or guarding; +BSx4  Back: spine midline, no bony tenderness or step-offs; no CVAT B/L  Extremities: WWP, no clubbing or cyanosis; no peripheral edema. Capillary refill <2 sec  Musculoskeletal: NROM x4; no joint swelling, tenderness or erythema  Vascular: 2+ radial, DP/PT pulses B/L  Dermatologic: ulcerated wound above gluteal cleft. no rashes, no scars  Lymphatic: no submandibular or cervical LAD  Neurologic: AAOx3; CNII-XII grossly intact; no focal deficits  Psychiatric: affect and characteristics of appearance, verbalizations, behaviors are appropriate        LABS:                CAPILLARY BLOOD GLUCOSE                MEDICATIONS  (STANDING):  enoxaparin Injectable 40 milliGRAM(s) SubCutaneous every 24 hours  nicotine -   7 mG/24Hr(s) Patch 1 Patch Transdermal daily  pantoprazole    Tablet 40 milliGRAM(s) Oral before breakfast  vancomycin  IVPB 1250 milliGRAM(s) IV Intermittent every 8 hours    MEDICATIONS  (PRN):  acetaminophen     Tablet .. 650 milliGRAM(s) Oral every 6 hours PRN Mild Pain (1 - 3)  aluminum hydroxide/magnesium hydroxide/simethicone Suspension 30 milliLiter(s) Oral every 6 hours PRN Dyspepsia  ibuprofen  Tablet. 400 milliGRAM(s) Oral every 6 hours PRN Moderate Pain (4 - 6)      RADIOLOGY & ADDITIONAL TESTS: Reviewed

## 2024-07-07 NOTE — PROGRESS NOTE ADULT - PROBLEM SELECTOR PLAN 1
sepsis POA (WBC, HR); BCx 6/20 growing MRSA in 2/2 bottles; source likely skin (wound above gluteal cleft, drainage cx growing MRSA; folliculitis of extremities); no e/o abscess on CT L-spine; HIV negative; WBC normalized on vanc; surveillance BCx 6/21 NGTD; no e/o IE on TTE, pretest probability not sufficiently high to warrant AUBREY  -- cont. vanc 1250mg q8h, dose per level; goal trough 13-18; plan for 4-week course (through 7/19), per ID recs  -- supportive care  -- contact precautions  -- f/u surveillance cultures  -- Vanc trouph 14.5 on 7/3 within therapeutic range. continue current dose of Vanc.  - Vanc trouph 7/7 pending sepsis POA (WBC, HR); BCx 6/20 growing MRSA in 2/2 bottles; source likely skin (wound above gluteal cleft, drainage cx growing MRSA; folliculitis of extremities); no e/o abscess on CT L-spine; HIV negative; WBC normalized on vanc; surveillance BCx 6/21 NGTD; no e/o IE on TTE, pretest probability not sufficiently high to warrant AUBREY  -- cont. vanc 1250mg q8h, dose per level; goal trough 13-18; plan for 4-week course (through 7/19), per ID recs  -- supportive care  -- contact precautions  -- f/u surveillance cultures  -- Vanc trouph 14.5 on 7/3 within therapeutic range. continue current dose of Vanc.  -- Vanc trouph 7/7 pending  --f/u labs

## 2024-07-08 PROCEDURE — 99232 SBSQ HOSP IP/OBS MODERATE 35: CPT | Mod: GC

## 2024-07-08 RX ORDER — MAGNESIUM, ALUMINUM HYDROXIDE 400-400
30 TABLET,CHEWABLE ORAL EVERY 4 HOURS
Refills: 0 | Status: DISCONTINUED | OUTPATIENT
Start: 2024-07-08 | End: 2024-07-19

## 2024-07-08 RX ADMIN — Medication 30 MILLILITER(S): at 04:47

## 2024-07-08 RX ADMIN — Medication 400 MILLIGRAM(S): at 14:11

## 2024-07-08 RX ADMIN — Medication 400 MILLIGRAM(S): at 22:30

## 2024-07-08 RX ADMIN — Medication 400 MILLIGRAM(S): at 06:30

## 2024-07-08 RX ADMIN — Medication 650 MILLIGRAM(S): at 02:17

## 2024-07-08 RX ADMIN — Medication 400 MILLIGRAM(S): at 21:29

## 2024-07-08 RX ADMIN — VANCOMYCIN HYDROCHLORIDE 166.67 MILLIGRAM(S): KIT at 02:18

## 2024-07-08 RX ADMIN — VANCOMYCIN HYDROCHLORIDE 166.67 MILLIGRAM(S): KIT at 17:41

## 2024-07-08 RX ADMIN — NICOTINE POLACRILEX 1 PATCH: 2 LOZENGE ORAL at 06:59

## 2024-07-08 RX ADMIN — Medication 400 MILLIGRAM(S): at 05:25

## 2024-07-08 RX ADMIN — NICOTINE POLACRILEX 1 PATCH: 2 LOZENGE ORAL at 13:11

## 2024-07-08 RX ADMIN — Medication 30 MILLILITER(S): at 13:53

## 2024-07-08 RX ADMIN — ENOXAPARIN SODIUM 40 MILLIGRAM(S): 100 INJECTION SUBCUTANEOUS at 13:26

## 2024-07-08 RX ADMIN — NICOTINE POLACRILEX 1 PATCH: 2 LOZENGE ORAL at 13:25

## 2024-07-08 RX ADMIN — Medication 30 MILLILITER(S): at 23:25

## 2024-07-08 RX ADMIN — Medication 400 MILLIGRAM(S): at 13:25

## 2024-07-08 NOTE — PROGRESS NOTE ADULT - SUBJECTIVE AND OBJECTIVE BOX
Patient is a 23y old  Male who presents with a chief complaint of Lower back abscess (07 Jul 2024 09:01)      HOSPITAL COURSE:     OVERNIGHT EVENTS:    SUBJECTIVE:     ROS: otherwise negative      T(C): 36.4 (07-07-24 @ 21:04), Max: 36.8 (07-07-24 @ 09:32)  HR: 99 (07-07-24 @ 21:04) (68 - 99)  BP: 129/70 (07-07-24 @ 21:04) (113/72 - 129/70)  RR: 17 (07-07-24 @ 21:04) (16 - 17)  SpO2: 96% (07-07-24 @ 21:04) (96% - 100%)  Wt(kg): --Vital Signs Last 24 Hrs  T(C): 36.4 (07 Jul 2024 21:04), Max: 36.8 (07 Jul 2024 09:32)  T(F): 97.6 (07 Jul 2024 21:04), Max: 98.3 (07 Jul 2024 09:32)  HR: 99 (07 Jul 2024 21:04) (68 - 99)  BP: 129/70 (07 Jul 2024 21:04) (113/72 - 129/70)  BP(mean): 90 (07 Jul 2024 21:04) (90 - 90)  RR: 17 (07 Jul 2024 21:04) (16 - 17)  SpO2: 96% (07 Jul 2024 21:04) (96% - 100%)    Parameters below as of 07 Jul 2024 21:04  Patient On (Oxygen Delivery Method): room air        PHYSICAL EXAM:  Constitutional: resting comfortably in bed; NAD  Head: NC/AT  Eyes: PERRL, EOMI, anicteric sclera  ENT: no nasal discharge; MMM  Neck: supple; no JVD or thyromegaly  Respiratory: CTA B/L; no W/R/R, no retractions  Cardiac: +S1/S2; RRR; no M/R/G  Gastrointestinal: soft, NT/ND; no rebound or guarding; +BSx4  Back: spine midline, no bony tenderness or step-offs; no CVAT B/L  Extremities: WWP, no clubbing or cyanosis; no peripheral edema. Capillary refill <2 sec  Musculoskeletal: NROM x4; no joint swelling, tenderness or erythema  Vascular: 2+ radial, DP/PT pulses B/L  Dermatologic: skin warm, dry and intact; no rashes, wounds, or scars  Lymphatic: no submandibular or cervical LAD  Neurologic: AAOx3; CNII-XII grossly intact; no focal deficits  Psychiatric: affect and characteristics of appearance, verbalizations, behaviors are appropriate    LABS:                        15.5   6.76  )-----------( 302      ( 07 Jul 2024 16:32 )             46.1     07-07    139  |  100  |  16  ----------------------------<  103<H>  4.2   |  27  |  0.94    Ca    9.6      07 Jul 2024 16:32  Phos  4.2     07-07  Mg     2.0     07-07    TPro  8.0  /  Alb  4.0  /  TBili  0.3  /  DBili  x   /  AST  22  /  ALT  35  /  AlkPhos  81  07-07        Urinalysis Basic - ( 07 Jul 2024 16:32 )    Color: x / Appearance: x / SG: x / pH: x  Gluc: 103 mg/dL / Ketone: x  / Bili: x / Urobili: x   Blood: x / Protein: x / Nitrite: x   Leuk Esterase: x / RBC: x / WBC x   Sq Epi: x / Non Sq Epi: x / Bacteria: x      CAPILLARY BLOOD GLUCOSE            Urinalysis Basic - ( 07 Jul 2024 16:32 )    Color: x / Appearance: x / SG: x / pH: x  Gluc: 103 mg/dL / Ketone: x  / Bili: x / Urobili: x   Blood: x / Protein: x / Nitrite: x   Leuk Esterase: x / RBC: x / WBC x   Sq Epi: x / Non Sq Epi: x / Bacteria: x        MEDICATIONS  (STANDING):  enoxaparin Injectable 40 milliGRAM(s) SubCutaneous every 24 hours  nicotine -   7 mG/24Hr(s) Patch 1 Patch Transdermal daily  pantoprazole    Tablet 40 milliGRAM(s) Oral before breakfast  vancomycin  IVPB 1250 milliGRAM(s) IV Intermittent every 8 hours    MEDICATIONS  (PRN):  acetaminophen     Tablet .. 650 milliGRAM(s) Oral every 6 hours PRN Mild Pain (1 - 3)  aluminum hydroxide/magnesium hydroxide/simethicone Suspension 30 milliLiter(s) Oral every 6 hours PRN Dyspepsia  ibuprofen  Tablet. 400 milliGRAM(s) Oral every 6 hours PRN Moderate Pain (4 - 6)      RADIOLOGY & ADDITIONAL TESTS: Reviewed   Patient is a 23y old  Male who presents with a chief complaint of Lower back abscess (07 Jul 2024 09:01)      HOSPITAL COURSE: HOSPITAL COURSE: HOSPITAL COURSE: Patient's vanc trough on 7/1 was supratherapeutic and he was switched to vancomycin 1250 mg IV Q8 per pharmacy recommendations. Patient vanc trough is 14.5 on 7/3 within therapeutic rage. Patient will remain on current regimen until 7/19 with plans of disposition to a shelter. Vanc trough 7/7, 14.1 within therapeutic range, will continue with current dose of vanc.     OVERNIGHT EVENTS: Patient had no acute events overnight.     SUBJECTIVE: Patient seen and examined at bedside. Patient complained of abdominal pain, with nausea, following dinner last night. Patient refused his protonix this morning.     ROS: otherwise negative      T(C): 36.4 (07-07-24 @ 21:04), Max: 36.8 (07-07-24 @ 09:32)  HR: 99 (07-07-24 @ 21:04) (68 - 99)  BP: 129/70 (07-07-24 @ 21:04) (113/72 - 129/70)  RR: 17 (07-07-24 @ 21:04) (16 - 17)  SpO2: 96% (07-07-24 @ 21:04) (96% - 100%)  Wt(kg): --Vital Signs Last 24 Hrs  T(C): 36.4 (07 Jul 2024 21:04), Max: 36.8 (07 Jul 2024 09:32)  T(F): 97.6 (07 Jul 2024 21:04), Max: 98.3 (07 Jul 2024 09:32)  HR: 99 (07 Jul 2024 21:04) (68 - 99)  BP: 129/70 (07 Jul 2024 21:04) (113/72 - 129/70)  BP(mean): 90 (07 Jul 2024 21:04) (90 - 90)  RR: 17 (07 Jul 2024 21:04) (16 - 17)  SpO2: 96% (07 Jul 2024 21:04) (96% - 100%)    Parameters below as of 07 Jul 2024 21:04  Patient On (Oxygen Delivery Method): room air      PHYSICAL EXAM:  Constitutional: resting comfortably in bed; NAD  Head: NC/AT  Eyes: PERRL, EOMI, anicteric sclera  ENT: no nasal discharge; MMM  Neck: supple; no JVD or thyromegaly  Respiratory: CTA B/L; no W/R/R, no retractions  Cardiac: +S1/S2; RRR; no M/R/G  Gastrointestinal: soft, NT/ND; no rebound or guarding; +BSx4  Back: spine midline, no bony tenderness or step-offs; no CVAT B/L  Extremities: WWP, no clubbing or cyanosis; no peripheral edema. Capillary refill <2 sec  Musculoskeletal: NROM x4; no joint swelling, tenderness or erythema  Vascular: 2+ radial, DP/PT pulses B/L  Dermatologic: Improving ulcerated wound above gluteal cleft. no rashes, no scars  Lymphatic: no submandibular or cervical LAD  Neurologic: AAOx3; CNII-XII grossly intact; no focal deficits  Psychiatric: affect and characteristics of appearance, verbalizations, behaviors are appropriate              LABS:                        15.5   6.76  )-----------( 302      ( 07 Jul 2024 16:32 )             46.1     07-07    139  |  100  |  16  ----------------------------<  103<H>  4.2   |  27  |  0.94    Ca    9.6      07 Jul 2024 16:32  Phos  4.2     07-07  Mg     2.0     07-07    TPro  8.0  /  Alb  4.0  /  TBili  0.3  /  DBili  x   /  AST  22  /  ALT  35  /  AlkPhos  81  07-07        Urinalysis Basic - ( 07 Jul 2024 16:32 )    Color: x / Appearance: x / SG: x / pH: x  Gluc: 103 mg/dL / Ketone: x  / Bili: x / Urobili: x   Blood: x / Protein: x / Nitrite: x   Leuk Esterase: x / RBC: x / WBC x   Sq Epi: x / Non Sq Epi: x / Bacteria: x      CAPILLARY BLOOD GLUCOSE            Urinalysis Basic - ( 07 Jul 2024 16:32 )    Color: x / Appearance: x / SG: x / pH: x  Gluc: 103 mg/dL / Ketone: x  / Bili: x / Urobili: x   Blood: x / Protein: x / Nitrite: x   Leuk Esterase: x / RBC: x / WBC x   Sq Epi: x / Non Sq Epi: x / Bacteria: x        MEDICATIONS  (STANDING):  enoxaparin Injectable 40 milliGRAM(s) SubCutaneous every 24 hours  nicotine -   7 mG/24Hr(s) Patch 1 Patch Transdermal daily  pantoprazole    Tablet 40 milliGRAM(s) Oral before breakfast  vancomycin  IVPB 1250 milliGRAM(s) IV Intermittent every 8 hours    MEDICATIONS  (PRN):  acetaminophen     Tablet .. 650 milliGRAM(s) Oral every 6 hours PRN Mild Pain (1 - 3)  aluminum hydroxide/magnesium hydroxide/simethicone Suspension 30 milliLiter(s) Oral every 6 hours PRN Dyspepsia  ibuprofen  Tablet. 400 milliGRAM(s) Oral every 6 hours PRN Moderate Pain (4 - 6)      RADIOLOGY & ADDITIONAL TESTS: Reviewed   Patient is a 23y old  Male who presents with a chief complaint of Lower back abscess (07 Jul 2024 09:01)      HOSPITAL COURSE: HOSPITAL COURSE: HOSPITAL COURSE: Patient's vanc trough on 7/1 was supratherapeutic and he was switched to vancomycin 1250 mg IV Q8 per pharmacy recommendations. Patient vanc trough is 14.5 on 7/3 within therapeutic rage. Patient will remain on current regimen until 7/19 with plans of disposition to a shelter. Vanc trough 7/7, 14.1 within therapeutic range, will continue with current dose of vanc. Patient reported having abdominal pain which started after dinner, was given Maalox     OVERNIGHT EVENTS: Patient had no acute events overnight.     SUBJECTIVE: Patient seen and examined at bedside. Patient complained of abdominal pain, with nausea, following dinner last night. Patient refused his protonix this morning. Patient unable to recall the last time he had a bowel movement, denied having diarrhea or constipation.     ROS: otherwise negative      T(C): 36.4 (07-07-24 @ 21:04), Max: 36.8 (07-07-24 @ 09:32)  HR: 99 (07-07-24 @ 21:04) (68 - 99)  BP: 129/70 (07-07-24 @ 21:04) (113/72 - 129/70)  RR: 17 (07-07-24 @ 21:04) (16 - 17)  SpO2: 96% (07-07-24 @ 21:04) (96% - 100%)  Wt(kg): --Vital Signs Last 24 Hrs  T(C): 36.4 (07 Jul 2024 21:04), Max: 36.8 (07 Jul 2024 09:32)  T(F): 97.6 (07 Jul 2024 21:04), Max: 98.3 (07 Jul 2024 09:32)  HR: 99 (07 Jul 2024 21:04) (68 - 99)  BP: 129/70 (07 Jul 2024 21:04) (113/72 - 129/70)  BP(mean): 90 (07 Jul 2024 21:04) (90 - 90)  RR: 17 (07 Jul 2024 21:04) (16 - 17)  SpO2: 96% (07 Jul 2024 21:04) (96% - 100%)    Parameters below as of 07 Jul 2024 21:04  Patient On (Oxygen Delivery Method): room air      PHYSICAL EXAM:  Constitutional: resting comfortably in bed; NAD  Head: NC/AT  Eyes: PERRL, EOMI, anicteric sclera  ENT: no nasal discharge; MMM  Neck: supple; no JVD or thyromegaly  Respiratory: CTA B/L; no W/R/R, no retractions  Cardiac: +S1/S2; RRR; no M/R/G  Gastrointestinal: soft, NT/ND; no rebound or guarding; +BSx4  Back: spine midline, no bony tenderness or step-offs; no CVAT B/L  Extremities: WWP, no clubbing or cyanosis; no peripheral edema. Capillary refill <2 sec  Musculoskeletal: NROM x4; no joint swelling, tenderness or erythema  Vascular: 2+ radial, DP/PT pulses B/L  Dermatologic: Healing ulcerated wound above gluteal cleft. no rashes, no scars  Lymphatic: no submandibular or cervical LAD  Neurologic: AAOx3; CNII-XII grossly intact; no focal deficits  Psychiatric: affect and characteristics of appearance, verbalizations, behaviors are appropriate              LABS:                        15.5   6.76  )-----------( 302      ( 07 Jul 2024 16:32 )             46.1     07-07    139  |  100  |  16  ----------------------------<  103<H>  4.2   |  27  |  0.94    Ca    9.6      07 Jul 2024 16:32  Phos  4.2     07-07  Mg     2.0     07-07    TPro  8.0  /  Alb  4.0  /  TBili  0.3  /  DBili  x   /  AST  22  /  ALT  35  /  AlkPhos  81  07-07        Urinalysis Basic - ( 07 Jul 2024 16:32 )    Color: x / Appearance: x / SG: x / pH: x  Gluc: 103 mg/dL / Ketone: x  / Bili: x / Urobili: x   Blood: x / Protein: x / Nitrite: x   Leuk Esterase: x / RBC: x / WBC x   Sq Epi: x / Non Sq Epi: x / Bacteria: x      CAPILLARY BLOOD GLUCOSE            Urinalysis Basic - ( 07 Jul 2024 16:32 )    Color: x / Appearance: x / SG: x / pH: x  Gluc: 103 mg/dL / Ketone: x  / Bili: x / Urobili: x   Blood: x / Protein: x / Nitrite: x   Leuk Esterase: x / RBC: x / WBC x   Sq Epi: x / Non Sq Epi: x / Bacteria: x        MEDICATIONS  (STANDING):  enoxaparin Injectable 40 milliGRAM(s) SubCutaneous every 24 hours  nicotine -   7 mG/24Hr(s) Patch 1 Patch Transdermal daily  pantoprazole    Tablet 40 milliGRAM(s) Oral before breakfast  vancomycin  IVPB 1250 milliGRAM(s) IV Intermittent every 8 hours    MEDICATIONS  (PRN):  acetaminophen     Tablet .. 650 milliGRAM(s) Oral every 6 hours PRN Mild Pain (1 - 3)  aluminum hydroxide/magnesium hydroxide/simethicone Suspension 30 milliLiter(s) Oral every 6 hours PRN Dyspepsia  ibuprofen  Tablet. 400 milliGRAM(s) Oral every 6 hours PRN Moderate Pain (4 - 6)      RADIOLOGY & ADDITIONAL TESTS: Reviewed   Patient is a 23y old  Male who presents with a chief complaint of Lower back abscess (07 Jul 2024 09:01)      HOSPITAL COURSE: Patient's vanc trough on 7/1 was supratherapeutic and he was switched to vancomycin 1250 mg IV Q8 per pharmacy recommendations. Patient vanc trough is 14.5 on 7/3 within therapeutic rage. Patient will remain on current regimen until 7/19 with plans of disposition to a shelter. Vanc trough 7/7, 14.1 within therapeutic range, will continue with current dose of vanc. Patient reported having abdominal pain which started after dinner, was given Maalox     OVERNIGHT EVENTS: Patient had no acute events overnight.     SUBJECTIVE: Patient seen and examined at bedside. Patient complained of abdominal pain, with nausea, following dinner last night. Patient refused his protonix this morning. Patient unable to recall the last time he had a bowel movement, denied having diarrhea or constipation.     ROS: otherwise negative      T(C): 36.4 (07-07-24 @ 21:04), Max: 36.8 (07-07-24 @ 09:32)  HR: 99 (07-07-24 @ 21:04) (68 - 99)  BP: 129/70 (07-07-24 @ 21:04) (113/72 - 129/70)  RR: 17 (07-07-24 @ 21:04) (16 - 17)  SpO2: 96% (07-07-24 @ 21:04) (96% - 100%)  Wt(kg): --Vital Signs Last 24 Hrs  T(C): 36.4 (07 Jul 2024 21:04), Max: 36.8 (07 Jul 2024 09:32)  T(F): 97.6 (07 Jul 2024 21:04), Max: 98.3 (07 Jul 2024 09:32)  HR: 99 (07 Jul 2024 21:04) (68 - 99)  BP: 129/70 (07 Jul 2024 21:04) (113/72 - 129/70)  BP(mean): 90 (07 Jul 2024 21:04) (90 - 90)  RR: 17 (07 Jul 2024 21:04) (16 - 17)  SpO2: 96% (07 Jul 2024 21:04) (96% - 100%)    Parameters below as of 07 Jul 2024 21:04  Patient On (Oxygen Delivery Method): room air      PHYSICAL EXAM:  Constitutional: resting comfortably in bed; NAD  Head: NC/AT  Eyes: PERRL, EOMI, anicteric sclera  ENT: no nasal discharge; MMM  Neck: supple; no JVD or thyromegaly  Respiratory: CTA B/L; no W/R/R, no retractions  Cardiac: +S1/S2; RRR; no M/R/G  Gastrointestinal: soft, NT/ND; no rebound or guarding; +BSx4  Back: spine midline, no bony tenderness or step-offs; no CVAT B/L  Extremities: WWP, no clubbing or cyanosis; no peripheral edema. Capillary refill <2 sec  Musculoskeletal: NROM x4; no joint swelling, tenderness or erythema  Vascular: 2+ radial, DP/PT pulses B/L  Dermatologic: Healing ulcerated wound above gluteal cleft. no rashes, no scars  Lymphatic: no submandibular or cervical LAD  Neurologic: AAOx3; CNII-XII grossly intact; no focal deficits  Psychiatric: affect and characteristics of appearance, verbalizations, behaviors are appropriate              LABS:                        15.5   6.76  )-----------( 302      ( 07 Jul 2024 16:32 )             46.1     07-07    139  |  100  |  16  ----------------------------<  103<H>  4.2   |  27  |  0.94    Ca    9.6      07 Jul 2024 16:32  Phos  4.2     07-07  Mg     2.0     07-07    TPro  8.0  /  Alb  4.0  /  TBili  0.3  /  DBili  x   /  AST  22  /  ALT  35  /  AlkPhos  81  07-07        Urinalysis Basic - ( 07 Jul 2024 16:32 )    Color: x / Appearance: x / SG: x / pH: x  Gluc: 103 mg/dL / Ketone: x  / Bili: x / Urobili: x   Blood: x / Protein: x / Nitrite: x   Leuk Esterase: x / RBC: x / WBC x   Sq Epi: x / Non Sq Epi: x / Bacteria: x      CAPILLARY BLOOD GLUCOSE            Urinalysis Basic - ( 07 Jul 2024 16:32 )    Color: x / Appearance: x / SG: x / pH: x  Gluc: 103 mg/dL / Ketone: x  / Bili: x / Urobili: x   Blood: x / Protein: x / Nitrite: x   Leuk Esterase: x / RBC: x / WBC x   Sq Epi: x / Non Sq Epi: x / Bacteria: x        MEDICATIONS  (STANDING):  enoxaparin Injectable 40 milliGRAM(s) SubCutaneous every 24 hours  nicotine -   7 mG/24Hr(s) Patch 1 Patch Transdermal daily  pantoprazole    Tablet 40 milliGRAM(s) Oral before breakfast  vancomycin  IVPB 1250 milliGRAM(s) IV Intermittent every 8 hours    MEDICATIONS  (PRN):  acetaminophen     Tablet .. 650 milliGRAM(s) Oral every 6 hours PRN Mild Pain (1 - 3)  aluminum hydroxide/magnesium hydroxide/simethicone Suspension 30 milliLiter(s) Oral every 6 hours PRN Dyspepsia  ibuprofen  Tablet. 400 milliGRAM(s) Oral every 6 hours PRN Moderate Pain (4 - 6)      RADIOLOGY & ADDITIONAL TESTS: Reviewed

## 2024-07-08 NOTE — PROGRESS NOTE ADULT - PROBLEM SELECTOR PLAN 1
sepsis POA (WBC, HR); BCx 6/20 growing MRSA in 2/2 bottles; source likely skin (wound above gluteal cleft, drainage cx growing MRSA; folliculitis of extremities); no e/o abscess on CT L-spine; HIV negative; WBC normalized on vanc; surveillance BCx 6/21 NGTD; no e/o IE on TTE, pretest probability not sufficiently high to warrant AUBREY  -- cont. vanc 1250mg q8h, dose per level; goal trough 13-18; plan for 4-week course (through 7/19), per ID recs  -- supportive care  -- contact precautions  -- f/u surveillance cultures  -- Vanc trouph 14.5 on 7/3 within therapeutic range. continue current dose of Vanc.  -- Vanc trouph 7/7 pending  --f/u labs sepsis POA (WBC, HR); BCx 6/20 growing MRSA in 2/2 bottles; source likely skin (wound above gluteal cleft, drainage cx growing MRSA; folliculitis of extremities); no e/o abscess on CT L-spine; HIV negative; WBC normalized on vanc; surveillance BCx 6/21 NGTD; no e/o IE on TTE, pretest probability not sufficiently high to warrant AUBREY  -- cont. vanc 1250mg q8h, dose per level; goal trough 13-18; plan for 4-week course (through 7/19), per ID recs  -- supportive care  -- contact precautions  -- f/u surveillance cultures  -- Vanc trouph 14.5 on 7/3 within therapeutic range. continue current dose of Vanc.  -- Vanc trouph 7/7 14.1.  -- Labs WNL.

## 2024-07-09 PROCEDURE — 99232 SBSQ HOSP IP/OBS MODERATE 35: CPT | Mod: GC

## 2024-07-09 RX ADMIN — NICOTINE POLACRILEX 1 PATCH: 2 LOZENGE ORAL at 19:23

## 2024-07-09 RX ADMIN — Medication 400 MILLIGRAM(S): at 23:48

## 2024-07-09 RX ADMIN — VANCOMYCIN HYDROCHLORIDE 166.67 MILLIGRAM(S): KIT at 01:01

## 2024-07-09 RX ADMIN — NICOTINE POLACRILEX 1 PATCH: 2 LOZENGE ORAL at 07:54

## 2024-07-09 RX ADMIN — Medication 650 MILLIGRAM(S): at 11:53

## 2024-07-09 RX ADMIN — PANTOPRAZOLE SODIUM 40 MILLIGRAM(S): 40 INJECTION, POWDER, FOR SOLUTION INTRAVENOUS at 06:58

## 2024-07-09 RX ADMIN — NICOTINE POLACRILEX 1 PATCH: 2 LOZENGE ORAL at 12:46

## 2024-07-09 RX ADMIN — Medication 650 MILLIGRAM(S): at 12:23

## 2024-07-09 RX ADMIN — Medication 400 MILLIGRAM(S): at 22:48

## 2024-07-09 RX ADMIN — ENOXAPARIN SODIUM 40 MILLIGRAM(S): 100 INJECTION SUBCUTANEOUS at 13:09

## 2024-07-09 RX ADMIN — NICOTINE POLACRILEX 1 PATCH: 2 LOZENGE ORAL at 11:53

## 2024-07-09 RX ADMIN — VANCOMYCIN HYDROCHLORIDE 166.67 MILLIGRAM(S): KIT at 17:17

## 2024-07-09 RX ADMIN — VANCOMYCIN HYDROCHLORIDE 166.67 MILLIGRAM(S): KIT at 09:46

## 2024-07-09 NOTE — PROGRESS NOTE ADULT - SUBJECTIVE AND OBJECTIVE BOX
Patient is a 23y old  Male who presents with a chief complaint of Lower back abscess (08 Jul 2024 06:47)      HOSPITAL COURSE: Patient's vanc trough on 7/1 was supratherapeutic and he was switched to vancomycin 1250 mg IV Q8 per pharmacy recommendations. Patient vanc trough is 14.5 on 7/3 within therapeutic rage. Patient will remain on current regimen until 7/19 with plans of disposition to a shelter. Vanc trough 7/7, 14.1 within therapeutic range, will continue with current dose of vanc. Patient reported having abdominal pain which started after dinner, was given Maalox    OVERNIGHT EVENTS: Patient had no acute events overnight.    SUBJECTIVE: Patient seen and examined a bedside. Patient reports still having GI upset  however, denied nausea or vomiting.     ROS: otherwise negative      T(C): 36.7 (07-09-24 @ 09:57), Max: 36.8 (07-09-24 @ 05:35)  HR: 91 (07-09-24 @ 09:57) (66 - 92)  BP: 145/84 (07-09-24 @ 09:57) (102/57 - 145/84)  RR: 18 (07-09-24 @ 09:57) (17 - 18)  SpO2: 100% (07-09-24 @ 09:57) (97% - 100%)  Wt(kg): --Vital Signs Last 24 Hrs  T(C): 36.7 (09 Jul 2024 09:57), Max: 36.8 (09 Jul 2024 05:35)  T(F): 98.1 (09 Jul 2024 09:57), Max: 98.2 (09 Jul 2024 05:35)  HR: 91 (09 Jul 2024 09:57) (66 - 92)  BP: 145/84 (09 Jul 2024 09:57) (102/57 - 145/84)  BP(mean): --  RR: 18 (09 Jul 2024 09:57) (17 - 18)  SpO2: 100% (09 Jul 2024 09:57) (97% - 100%)    Parameters below as of 09 Jul 2024 09:57  Patient On (Oxygen Delivery Method): room air        PHYSICAL EXAM:  Constitutional: resting comfortably in bed; NAD  Head: NC/AT  Eyes: PERRL, EOMI, anicteric sclera  ENT: no nasal discharge; MMM  Neck: supple; no JVD or thyromegaly  Respiratory: CTA B/L; no W/R/R, no retractions  Cardiac: +S1/S2; RRR; no M/R/G  Gastrointestinal: soft, NT/ND; no rebound or guarding; +BSx4  Back: spine midline, no bony tenderness or step-offs; no CVAT B/L  Extremities: WWP, no clubbing or cyanosis; no peripheral edema. Capillary refill <2 sec  Musculoskeletal: NROM x4; no joint swelling, tenderness or erythema  Vascular: 2+ radial, DP/PT pulses B/L  Dermatologic: Healing ulcerated wound above gluteal cleft. no rashes, no scars  Lymphatic: no submandibular or cervical LAD  Neurologic: AAOx3; CNII-XII grossly intact; no focal deficits  Psychiatric: affect and characteristics of appearance, verbalizations, behaviors are appropriate      LABS:                        15.5   6.76  )-----------( 302      ( 07 Jul 2024 16:32 )             46.1     07-07    139  |  100  |  16  ----------------------------<  103<H>  4.2   |  27  |  0.94    Ca    9.6      07 Jul 2024 16:32  Phos  4.2     07-07  Mg     2.0     07-07    TPro  8.0  /  Alb  4.0  /  TBili  0.3  /  DBili  x   /  AST  22  /  ALT  35  /  AlkPhos  81  07-07        Urinalysis Basic - ( 07 Jul 2024 16:32 )    Color: x / Appearance: x / SG: x / pH: x  Gluc: 103 mg/dL / Ketone: x  / Bili: x / Urobili: x   Blood: x / Protein: x / Nitrite: x   Leuk Esterase: x / RBC: x / WBC x   Sq Epi: x / Non Sq Epi: x / Bacteria: x      CAPILLARY BLOOD GLUCOSE            Urinalysis Basic - ( 07 Jul 2024 16:32 )    Color: x / Appearance: x / SG: x / pH: x  Gluc: 103 mg/dL / Ketone: x  / Bili: x / Urobili: x   Blood: x / Protein: x / Nitrite: x   Leuk Esterase: x / RBC: x / WBC x   Sq Epi: x / Non Sq Epi: x / Bacteria: x        MEDICATIONS  (STANDING):  enoxaparin Injectable 40 milliGRAM(s) SubCutaneous every 24 hours  nicotine -   7 mG/24Hr(s) Patch 1 Patch Transdermal daily  pantoprazole    Tablet 40 milliGRAM(s) Oral before breakfast  vancomycin  IVPB 1250 milliGRAM(s) IV Intermittent every 8 hours    MEDICATIONS  (PRN):  acetaminophen     Tablet .. 650 milliGRAM(s) Oral every 6 hours PRN Mild Pain (1 - 3)  aluminum hydroxide/magnesium hydroxide/simethicone Suspension 30 milliLiter(s) Oral every 4 hours PRN Dyspepsia  ibuprofen  Tablet. 400 milliGRAM(s) Oral every 6 hours PRN Moderate Pain (4 - 6)      RADIOLOGY & ADDITIONAL TESTS: Reviewed

## 2024-07-09 NOTE — PROGRESS NOTE ADULT - PROBLEM SELECTOR PLAN 1
sepsis POA (WBC, HR); BCx 6/20 growing MRSA in 2/2 bottles; source likely skin (wound above gluteal cleft, drainage cx growing MRSA; folliculitis of extremities); no e/o abscess on CT L-spine; HIV negative; WBC normalized on vanc; surveillance BCx 6/21 NGTD; no e/o IE on TTE, pretest probability not sufficiently high to warrant AUBREY  -- cont. vanc 1250mg q8h, dose per level; goal trough 13-18; plan for 4-week course (through 7/19), per ID recs  -- supportive care  -- contact precautions  -- f/u surveillance cultures  -- Vanc trough 14.5 on 7/3 within therapeutic range. continue current dose of Vanc.  -- Vanc trough 7/7 14.1.  -- Labs WNL

## 2024-07-09 NOTE — ADVANCED PRACTICE NURSE CONSULT - RECOMMEDATIONS
Lower back: triad, foam every other day until healed.     Discussed with pt, primary RN     Please reconsult if wound deteriorates or new concerns arise.    Total time spent: 30 minutes

## 2024-07-10 PROCEDURE — 99232 SBSQ HOSP IP/OBS MODERATE 35: CPT | Mod: GC

## 2024-07-10 RX ADMIN — NICOTINE POLACRILEX 1 PATCH: 2 LOZENGE ORAL at 19:37

## 2024-07-10 RX ADMIN — Medication 400 MILLIGRAM(S): at 15:19

## 2024-07-10 RX ADMIN — Medication 30 MILLILITER(S): at 22:05

## 2024-07-10 RX ADMIN — Medication 30 MILLILITER(S): at 14:04

## 2024-07-10 RX ADMIN — NICOTINE POLACRILEX 1 PATCH: 2 LOZENGE ORAL at 14:03

## 2024-07-10 RX ADMIN — ENOXAPARIN SODIUM 40 MILLIGRAM(S): 100 INJECTION SUBCUTANEOUS at 14:04

## 2024-07-10 RX ADMIN — Medication 30 MILLILITER(S): at 01:43

## 2024-07-10 RX ADMIN — NICOTINE POLACRILEX 1 PATCH: 2 LOZENGE ORAL at 09:06

## 2024-07-10 RX ADMIN — Medication 400 MILLIGRAM(S): at 22:05

## 2024-07-10 RX ADMIN — Medication 400 MILLIGRAM(S): at 14:04

## 2024-07-10 RX ADMIN — VANCOMYCIN HYDROCHLORIDE 166.67 MILLIGRAM(S): KIT at 01:36

## 2024-07-10 RX ADMIN — PANTOPRAZOLE SODIUM 40 MILLIGRAM(S): 40 INJECTION, POWDER, FOR SOLUTION INTRAVENOUS at 06:40

## 2024-07-10 NOTE — PROGRESS NOTE ADULT - ASSESSMENT
23 year old man with hx of Drug abuse ( crack cocaine and meth ), admitted to the hospital with lower back cellulitis and MRSA bacteremia     Impression and Plan  # MRSA Bacteremia   - source - back cellulitis   - Blood cx negative since 6/21 , TTE without evidence of Endocarditis and given easily identifiable source, quick resolution of bacteremia and no other potential hardware for seeding he had a low pre test probability for Endocarditis and AUBREY is not warranted at this time   - Per ID 4 weeks of IV vancomycin , End Date 7/19/24     # Polysubstance abuse   - not in withdrawal     # GERD   - Continue PPI     # Hypokalemia   # Hypophosphatemia   - resolved after treatment     # Lovenox for DVT prophylaxis      23 year old man with hx of Drug abuse ( crack cocaine and meth ), admitted to the hospital with lower back cellulitis and MRSA bacteremia     Impression and Plan  # MRSA Bacteremia   - source - back cellulitis   - Blood cx negative since 6/21, TTE without evidence of Endocarditis and given easily identifiable source, quick resolution of bacteremia and no other potential hardware for seeding he had a low pre test probability for Endocarditis and AUBREY is not warranted at this time   - Per ID 4 weeks of IV vancomycin , End Date 7/19/24     # Polysubstance abuse   - not in withdrawal     # GERD   - Continue PPI     # Hypokalemia   # Hypophosphatemia   - resolved after treatment     # Lovenox for DVT prophylaxis

## 2024-07-10 NOTE — PROGRESS NOTE ADULT - SUBJECTIVE AND OBJECTIVE BOX
Patient is a 23y old  Male who presents with a chief complaint of Lower back abscess (09 Jul 2024 06:27)      HOSPITAL COURSE: Patient's vanc trough on 7/1 was supratherapeutic and he was switched to vancomycin 1250 mg IV Q8 per pharmacy recommendations. Patient vanc trough is 14.5 on 7/3 within therapeutic rage. Patient will remain on current regimen until 7/19 with plans of disposition to a shelter. Vanc trough 7/7, 14.1 within therapeutic range, will continue with current dose of vanc. Patient reported having abdominal pain which started after dinner, was given Maalox    OVERNIGHT EVENTS: no acute events overnight    SUBJECTIVE:     ROS: otherwise negative      T(C): 36.6 (07-10-24 @ 05:33), Max: 37.2 (07-09-24 @ 22:45)  HR: 81 (07-10-24 @ 05:33) (81 - 85)  BP: 113/71 (07-10-24 @ 05:33) (101/60 - 113/71)  RR: 18 (07-10-24 @ 05:33) (18 - 18)  SpO2: 96% (07-10-24 @ 05:33) (96% - 97%)  Wt(kg): --Vital Signs Last 24 Hrs  T(C): 36.6 (10 Jul 2024 05:33), Max: 37.2 (09 Jul 2024 22:45)  T(F): 97.8 (10 Jul 2024 05:33), Max: 98.9 (09 Jul 2024 22:45)  HR: 81 (10 Jul 2024 05:33) (81 - 85)  BP: 113/71 (10 Jul 2024 05:33) (101/60 - 113/71)  BP(mean): 81 (09 Jul 2024 22:45) (81 - 81)  RR: 18 (10 Jul 2024 05:33) (18 - 18)  SpO2: 96% (10 Jul 2024 05:33) (96% - 97%)    Parameters below as of 09 Jul 2024 22:45  Patient On (Oxygen Delivery Method): room air        PHYSICAL EXAM:  Constitutional: resting comfortably in bed; NAD  Head: NC/AT  Eyes: PERRL, EOMI, anicteric sclera  ENT: no nasal discharge; MMM  Neck: supple; no JVD or thyromegaly  Respiratory: CTA B/L; no W/R/R, no retractions  Cardiac: +S1/S2; RRR; no M/R/G  Gastrointestinal: soft, NT/ND; no rebound or guarding; +BSx4  Back: spine midline, no bony tenderness or step-offs; no CVAT B/L  Extremities: WWP, no clubbing or cyanosis; no peripheral edema. Capillary refill <2 sec  Musculoskeletal: NROM x4; no joint swelling, tenderness or erythema  Vascular: 2+ radial, DP/PT pulses B/L  Dermatologic: skin warm, dry and intact; no rashes, wounds, or scars  Lymphatic: no submandibular or cervical LAD  Neurologic: AAOx3; CNII-XII grossly intact; no focal deficits  Psychiatric: affect and characteristics of appearance, verbalizations, behaviors are appropriate    LABS:                CAPILLARY BLOOD GLUCOSE                MEDICATIONS  (STANDING):  enoxaparin Injectable 40 milliGRAM(s) SubCutaneous every 24 hours  nicotine -   7 mG/24Hr(s) Patch 1 Patch Transdermal daily  pantoprazole    Tablet 40 milliGRAM(s) Oral before breakfast  vancomycin  IVPB 1250 milliGRAM(s) IV Intermittent every 8 hours    MEDICATIONS  (PRN):  acetaminophen     Tablet .. 650 milliGRAM(s) Oral every 6 hours PRN Mild Pain (1 - 3)  aluminum hydroxide/magnesium hydroxide/simethicone Suspension 30 milliLiter(s) Oral every 4 hours PRN Dyspepsia  ibuprofen  Tablet. 400 milliGRAM(s) Oral every 6 hours PRN Moderate Pain (4 - 6)      RADIOLOGY & ADDITIONAL TESTS: Reviewed     Patient is a 23y old  Male who presents with a chief complaint of Lower back abscess (09 Jul 2024 06:27)      HOSPITAL COURSE: Patient's vanc trough on 7/1 was supratherapeutic and he was switched to vancomycin 1250 mg IV Q8 per pharmacy recommendations. Patient vanc trough is 14.5 on 7/3 within therapeutic rage. Patient will remain on current regimen until 7/19 with plans of disposition to a shelter. Vanc trough 7/7, 14.1 within therapeutic range, will continue with current dose of vanc. Patient reported having abdominal pain which started after dinner, was given Maalox    OVERNIGHT EVENTS: no acute events overnight    SUBJECTIVE: patient has no complaints    ROS: otherwise negative      T(C): 36.6 (07-10-24 @ 05:33), Max: 37.2 (07-09-24 @ 22:45)  HR: 81 (07-10-24 @ 05:33) (81 - 85)  BP: 113/71 (07-10-24 @ 05:33) (101/60 - 113/71)  RR: 18 (07-10-24 @ 05:33) (18 - 18)  SpO2: 96% (07-10-24 @ 05:33) (96% - 97%)  Wt(kg): --Vital Signs Last 24 Hrs  T(C): 36.6 (10 Jul 2024 05:33), Max: 37.2 (09 Jul 2024 22:45)  T(F): 97.8 (10 Jul 2024 05:33), Max: 98.9 (09 Jul 2024 22:45)  HR: 81 (10 Jul 2024 05:33) (81 - 85)  BP: 113/71 (10 Jul 2024 05:33) (101/60 - 113/71)  BP(mean): 81 (09 Jul 2024 22:45) (81 - 81)  RR: 18 (10 Jul 2024 05:33) (18 - 18)  SpO2: 96% (10 Jul 2024 05:33) (96% - 97%)    Parameters below as of 09 Jul 2024 22:45  Patient On (Oxygen Delivery Method): room air        PHYSICAL EXAM:  Constitutional: resting comfortably in bed; NAD  Head: NC/AT  Respiratory: CTA B/L; no W/R/R, no retractions  Cardiac: +S1/S2; RRR; no M/R/G  Gastrointestinal: soft, NT/ND; no rebound or guarding; +BSx4  Extremities: WWP, no clubbing or cyanosis; no peripheral edema. Capillary refill <2 sec  Musculoskeletal: NROM x4; no joint swelling, tenderness or erythema  Vascular: 2+ radial, DP/PT pulses B/L  Dermatologic: skin warm, dry and intact; no rashes, wounds, or scars  Neurologic: AAOx3  Psychiatric: affect and characteristics of appearance, verbalizations, behaviors are appropriate    LABS:                CAPILLARY BLOOD GLUCOSE                MEDICATIONS  (STANDING):  enoxaparin Injectable 40 milliGRAM(s) SubCutaneous every 24 hours  nicotine -   7 mG/24Hr(s) Patch 1 Patch Transdermal daily  pantoprazole    Tablet 40 milliGRAM(s) Oral before breakfast  vancomycin  IVPB 1250 milliGRAM(s) IV Intermittent every 8 hours    MEDICATIONS  (PRN):  acetaminophen     Tablet .. 650 milliGRAM(s) Oral every 6 hours PRN Mild Pain (1 - 3)  aluminum hydroxide/magnesium hydroxide/simethicone Suspension 30 milliLiter(s) Oral every 4 hours PRN Dyspepsia  ibuprofen  Tablet. 400 milliGRAM(s) Oral every 6 hours PRN Moderate Pain (4 - 6)      RADIOLOGY & ADDITIONAL TESTS: Reviewed

## 2024-07-11 PROCEDURE — 99232 SBSQ HOSP IP/OBS MODERATE 35: CPT | Mod: GC

## 2024-07-11 RX ADMIN — Medication 400 MILLIGRAM(S): at 16:30

## 2024-07-11 RX ADMIN — ENOXAPARIN SODIUM 40 MILLIGRAM(S): 100 INJECTION SUBCUTANEOUS at 13:05

## 2024-07-11 RX ADMIN — Medication 400 MILLIGRAM(S): at 10:10

## 2024-07-11 RX ADMIN — NICOTINE POLACRILEX 1 PATCH: 2 LOZENGE ORAL at 19:34

## 2024-07-11 RX ADMIN — VANCOMYCIN HYDROCHLORIDE 166.67 MILLIGRAM(S): KIT at 09:00

## 2024-07-11 RX ADMIN — VANCOMYCIN HYDROCHLORIDE 166.67 MILLIGRAM(S): KIT at 01:23

## 2024-07-11 RX ADMIN — NICOTINE POLACRILEX 1 PATCH: 2 LOZENGE ORAL at 08:06

## 2024-07-11 RX ADMIN — Medication 400 MILLIGRAM(S): at 09:10

## 2024-07-11 RX ADMIN — Medication 400 MILLIGRAM(S): at 15:30

## 2024-07-11 RX ADMIN — PANTOPRAZOLE SODIUM 40 MILLIGRAM(S): 40 INJECTION, POWDER, FOR SOLUTION INTRAVENOUS at 07:31

## 2024-07-11 RX ADMIN — Medication 30 MILLILITER(S): at 11:29

## 2024-07-11 RX ADMIN — VANCOMYCIN HYDROCHLORIDE 166.67 MILLIGRAM(S): KIT at 17:25

## 2024-07-11 NOTE — PROGRESS NOTE ADULT - SUBJECTIVE AND OBJECTIVE BOX
Patient is a 23y old  Male who presents with a chief complaint of Back abscess (10 Jul 2024 10:41)      HOSPITAL COURSE: Patient's vanc trough on 7/1 was supratherapeutic and he was switched to vancomycin 1250 mg IV Q8 per pharmacy recommendations. Patient vanc trough is 14.5 on 7/3 within therapeutic rage. Patient will remain on current regimen until 7/19 with plans of disposition to a shelter. Vanc trough 7/7, 14.1 within therapeutic range, will continue with current dose of vanc. Patient reported having abdominal pain which started after dinner, was given Maalox      OVERNIGHT EVENTS: mary    SUBJECTIVE: no complaints    ROS: otherwise negative      T(C): 36.7 (07-11-24 @ 05:32), Max: 36.9 (07-10-24 @ 15:36)  HR: 79 (07-11-24 @ 05:32) (79 - 81)  BP: 107/67 (07-11-24 @ 05:32) (100/64 - 107/67)  RR: 18 (07-11-24 @ 05:32) (18 - 18)  SpO2: 97% (07-11-24 @ 05:32) (97% - 97%)  Wt(kg): --Vital Signs Last 24 Hrs  T(C): 36.7 (11 Jul 2024 05:32), Max: 36.9 (10 Jul 2024 15:36)  T(F): 98 (11 Jul 2024 05:32), Max: 98.5 (10 Jul 2024 15:36)  HR: 79 (11 Jul 2024 05:32) (79 - 81)  BP: 107/67 (11 Jul 2024 05:32) (100/64 - 107/67)  BP(mean): --  RR: 18 (11 Jul 2024 05:32) (18 - 18)  SpO2: 97% (11 Jul 2024 05:32) (97% - 97%)    Parameters below as of 10 Jul 2024 15:36  Patient On (Oxygen Delivery Method): room air        PHYSICAL EXAM:  Constitutional: resting comfortably in bed; NAD  Head: NC/AT  Eyes: PERRL, EOMI, anicteric sclera  Neck: supple; no JVD or thyromegaly  Respiratory: CTA B/L; no W/R/R, no retractions  Cardiac: +S1/S2; RRR; no M/R/G  Gastrointestinal: soft, NT/ND; no rebound or guarding; +BSx4  Extremities: WWP, no clubbing or cyanosis; no peripheral edema. Capillary refill <2 sec  Musculoskeletal: NROM x4; no joint swelling, tenderness or erythema  Vascular: 2+ radial, DP/PT pulses B/L  Dermatologic: skin warm, dry and intact; no rashes, wounds, or scars  Neurologic: AAOx3  Psychiatric: affect and characteristics of appearance, verbalizations, behaviors are appropriate    LABS:                CAPILLARY BLOOD GLUCOSE                MEDICATIONS  (STANDING):  enoxaparin Injectable 40 milliGRAM(s) SubCutaneous every 24 hours  nicotine -   7 mG/24Hr(s) Patch 1 Patch Transdermal daily  pantoprazole    Tablet 40 milliGRAM(s) Oral before breakfast  vancomycin  IVPB 1250 milliGRAM(s) IV Intermittent every 8 hours    MEDICATIONS  (PRN):  acetaminophen     Tablet .. 650 milliGRAM(s) Oral every 6 hours PRN Mild Pain (1 - 3)  aluminum hydroxide/magnesium hydroxide/simethicone Suspension 30 milliLiter(s) Oral every 4 hours PRN Dyspepsia  ibuprofen  Tablet. 400 milliGRAM(s) Oral every 6 hours PRN Moderate Pain (4 - 6)      RADIOLOGY & ADDITIONAL TESTS: Reviewed

## 2024-07-11 NOTE — PROGRESS NOTE ADULT - TIME BILLING
treatment of MRSA bacteremia
treatment of MRSA bacteremia
Review of chart and imaging. Evaluation of patient. Discussion with the resident/consultants.
Bedside exam and interview    Review of , labs, vitals, imaging   Discharge planning on Interdisciplinary rounds   Discussion with consultants   Documenting the encounter.
coordination of care  direct patient encounter  reviewed labs and images  documentation  d/c planning  d/w Medicine residents on rounds
treatment of MRSA bacteremia
treatment of MRSA bacteremia
coordination of care  direct patient encounter  reviewed labs and images  documentation  d/c planning  d/w Medicine residents on rounds
Bedside exam and interview    Review of hospital course, labs, vitals, imaging ,  medical records.   Reviewing outside records   Discharge planning on Interdisciplinary rounds   Discussion with consultants and Primary team   Documenting the encounter.
coordination of care  direct patient encounter  reviewed labs, cultures, and images  documentation  d/c planning  d/w Medicine residents on rounds, ID resident
Bedside exam and interview    Review of hospital course, labs, vitals, imaging ,  medical records.   Reviewing outside records   Discharge planning on Interdisciplinary rounds   Discussion with consultants and Primary team   Documenting the encounter.
Bedside exam and interview    Review of hospital course, labs, vitals, imaging ,  medical records.   Reviewing outside records   Discharge planning on Interdisciplinary rounds   Discussion with consultants and patient   Documenting the encounter.

## 2024-07-11 NOTE — PROGRESS NOTE ADULT - ASSESSMENT
23 year old man with hx of Drug abuse ( crack cocaine and meth ), admitted to the hospital with lower back cellulitis and MRSA bacteremia     Impression and Plan  # MRSA Bacteremia   - source - back cellulitis   - Blood cx negative since 6/21, TTE without evidence of Endocarditis and given easily identifiable source, quick resolution of bacteremia and no other potential hardware for seeding he had a low pre test probability for Endocarditis and AUBREY is not warranted at this time   - Per ID 4 weeks of IV vancomycin , End Date 7/19/24     # Polysubstance abuse   - not in withdrawal     # GERD   - Continue PPI     # Hypokalemia   # Hypophosphatemia   - resolved after treatment     # Lovenox for DVT prophylaxis

## 2024-07-12 PROCEDURE — 99233 SBSQ HOSP IP/OBS HIGH 50: CPT | Mod: GC

## 2024-07-12 RX ADMIN — NICOTINE POLACRILEX 1 PATCH: 2 LOZENGE ORAL at 18:45

## 2024-07-12 RX ADMIN — VANCOMYCIN HYDROCHLORIDE 166.67 MILLIGRAM(S): KIT at 18:54

## 2024-07-12 RX ADMIN — NICOTINE POLACRILEX 1 PATCH: 2 LOZENGE ORAL at 12:38

## 2024-07-12 RX ADMIN — VANCOMYCIN HYDROCHLORIDE 166.67 MILLIGRAM(S): KIT at 01:23

## 2024-07-12 RX ADMIN — Medication 400 MILLIGRAM(S): at 01:21

## 2024-07-12 RX ADMIN — ENOXAPARIN SODIUM 40 MILLIGRAM(S): 100 INJECTION SUBCUTANEOUS at 14:58

## 2024-07-12 RX ADMIN — Medication 400 MILLIGRAM(S): at 10:56

## 2024-07-12 RX ADMIN — VANCOMYCIN HYDROCHLORIDE 166.67 MILLIGRAM(S): KIT at 10:09

## 2024-07-12 RX ADMIN — Medication 30 MILLILITER(S): at 07:22

## 2024-07-12 RX ADMIN — Medication 400 MILLIGRAM(S): at 10:10

## 2024-07-12 RX ADMIN — Medication 30 MILLILITER(S): at 12:35

## 2024-07-12 RX ADMIN — NICOTINE POLACRILEX 1 PATCH: 2 LOZENGE ORAL at 06:55

## 2024-07-12 RX ADMIN — NICOTINE POLACRILEX 1 PATCH: 2 LOZENGE ORAL at 12:34

## 2024-07-12 RX ADMIN — Medication 400 MILLIGRAM(S): at 02:17

## 2024-07-12 RX ADMIN — PANTOPRAZOLE SODIUM 40 MILLIGRAM(S): 40 INJECTION, POWDER, FOR SOLUTION INTRAVENOUS at 06:36

## 2024-07-12 RX ADMIN — Medication 30 MILLILITER(S): at 01:22

## 2024-07-12 NOTE — PROGRESS NOTE ADULT - SUBJECTIVE AND OBJECTIVE BOX
Patient is a 23y old  Male who presents with a chief complaint of back abscess (11 Jul 2024 08:35)      HOSPITAL COURSE:  Patient's vanc trough on 7/1 was supratherapeutic and he was switched to vancomycin 1250 mg IV Q8 per pharmacy recommendations. Patient vanc trough is 14.5 on 7/3 within therapeutic rage. Patient will remain on current regimen until 7/19 with plans of disposition to a shelter. Vanc trough 7/7, 14.1 within therapeutic range, will continue with current dose of vanc. Patient reported having abdominal pain which started after dinner, was given Maalox. 7/12 Patient continuing vancomycin treatment, no acute medical events, although he was acting somewhat verbally aggressive in the morning and wanted to be left alone.       OVERNIGHT EVENTS: mary    SUBJECTIVE: no acute complaints    ROS: otherwise negative      T(C): 36.4 (07-12-24 @ 05:35), Max: 37 (07-11-24 @ 21:05)  HR: 73 (07-12-24 @ 05:35) (62 - 75)  BP: 125/79 (07-12-24 @ 05:35) (107/62 - 128/77)  RR: 18 (07-12-24 @ 05:35) (18 - 18)  SpO2: 100% (07-12-24 @ 05:35) (98% - 100%)  Wt(kg): --Vital Signs Last 24 Hrs  T(C): 36.4 (12 Jul 2024 05:35), Max: 37 (11 Jul 2024 21:05)  T(F): 97.5 (12 Jul 2024 05:35), Max: 98.6 (11 Jul 2024 21:05)  HR: 73 (12 Jul 2024 05:35) (62 - 75)  BP: 125/79 (12 Jul 2024 05:35) (107/62 - 128/77)  BP(mean): --  RR: 18 (12 Jul 2024 05:35) (18 - 18)  SpO2: 100% (12 Jul 2024 05:35) (98% - 100%)    Parameters below as of 11 Jul 2024 21:05  Patient On (Oxygen Delivery Method): room air        PHYSICAL EXAM:  Constitutional: resting comfortably in bed; NAD  Head: NC/AT  Eyes: PERRL, EOMI, anicteric sclera  Neck: supple; no JVD or thyromegaly  Respiratory: CTA B/L; no W/R/R, no retractions  Cardiac: +S1/S2; RRR; no M/R/G  Gastrointestinal: soft, NT/ND; no rebound or guarding; +BSx4  Extremities: WWP, no clubbing or cyanosis; no peripheral edema. Capillary refill <2 sec  Musculoskeletal: NROM x4; no joint swelling, tenderness or erythema  Vascular: 2+ radial, DP/PT pulses B/L  Dermatologic: skin warm, dry and intact; no rashes, wounds, or scars  Neurologic: AAOx3  Psychiatric: affect and characteristics of appearance, verbalizations, behaviors are appropriate    LABS:                CAPILLARY BLOOD GLUCOSE                MEDICATIONS  (STANDING):  enoxaparin Injectable 40 milliGRAM(s) SubCutaneous every 24 hours  nicotine -   7 mG/24Hr(s) Patch 1 Patch Transdermal daily  pantoprazole    Tablet 40 milliGRAM(s) Oral before breakfast  vancomycin  IVPB 1250 milliGRAM(s) IV Intermittent every 8 hours    MEDICATIONS  (PRN):  acetaminophen     Tablet .. 650 milliGRAM(s) Oral every 6 hours PRN Mild Pain (1 - 3)  aluminum hydroxide/magnesium hydroxide/simethicone Suspension 30 milliLiter(s) Oral every 4 hours PRN Dyspepsia  ibuprofen  Tablet. 400 milliGRAM(s) Oral every 6 hours PRN Moderate Pain (4 - 6)      RADIOLOGY & ADDITIONAL TESTS: Reviewed   Patient is a 23y old  Male who presents with a chief complaint of back abscess (11 Jul 2024 08:35)      HOSPITAL COURSE:  Patient's vanc trough on 7/1 was supratherapeutic and he was switched to vancomycin 1250 mg IV Q8 per pharmacy recommendations. Patient vanc trough is 14.5 on 7/3 within therapeutic rage. Patient will remain on current regimen until 7/19 with plans of disposition to a shelter. Vanc trough 7/7, 14.1 within therapeutic range, will continue with current dose of vanc. Patient reported having abdominal pain which started after dinner, was given Maalox. 7/12 Patient continuing vancomycin treatment, no acute medical events, although he was acting somewhat verbally aggressive in the morning and wanted to be left alone.       OVERNIGHT EVENTS: mary    SUBJECTIVE: no acute complaints, but patient was not amenable to a full examination this morning. He repeatedly stated "get the hell out"     ROS: otherwise negative      T(C): 36.4 (07-12-24 @ 05:35), Max: 37 (07-11-24 @ 21:05)  HR: 73 (07-12-24 @ 05:35) (62 - 75)  BP: 125/79 (07-12-24 @ 05:35) (107/62 - 128/77)  RR: 18 (07-12-24 @ 05:35) (18 - 18)  SpO2: 100% (07-12-24 @ 05:35) (98% - 100%)  Wt(kg): --Vital Signs Last 24 Hrs  T(C): 36.4 (12 Jul 2024 05:35), Max: 37 (11 Jul 2024 21:05)  T(F): 97.5 (12 Jul 2024 05:35), Max: 98.6 (11 Jul 2024 21:05)  HR: 73 (12 Jul 2024 05:35) (62 - 75)  BP: 125/79 (12 Jul 2024 05:35) (107/62 - 128/77)  BP(mean): --  RR: 18 (12 Jul 2024 05:35) (18 - 18)  SpO2: 100% (12 Jul 2024 05:35) (98% - 100%)    Parameters below as of 11 Jul 2024 21:05  Patient On (Oxygen Delivery Method): room air        PHYSICAL EXAM:  Constitutional: resting comfortably in bed; NAD  Head: NC/AT  Eyes: PERRL, EOMI, anicteric sclera  Neck: supple; no JVD or thyromegaly  Respiratory: CTA B/L; no W/R/R, no retractions  Cardiac: +S1/S2; RRR; no M/R/G  Gastrointestinal: soft, NT/ND; no rebound or guarding; +BSx4  Extremities: WWP, no clubbing or cyanosis; no peripheral edema. Capillary refill <2 sec  Musculoskeletal: NROM x4; no joint swelling, tenderness or erythema  Vascular: 2+ radial, DP/PT pulses B/L  Dermatologic: skin warm, dry and intact; no rashes, wounds, or scars  Neurologic: AAOx3  Psychiatric: affect and characteristics of appearance, verbalizations, behaviors are appropriate    LABS:                CAPILLARY BLOOD GLUCOSE                MEDICATIONS  (STANDING):  enoxaparin Injectable 40 milliGRAM(s) SubCutaneous every 24 hours  nicotine -   7 mG/24Hr(s) Patch 1 Patch Transdermal daily  pantoprazole    Tablet 40 milliGRAM(s) Oral before breakfast  vancomycin  IVPB 1250 milliGRAM(s) IV Intermittent every 8 hours    MEDICATIONS  (PRN):  acetaminophen     Tablet .. 650 milliGRAM(s) Oral every 6 hours PRN Mild Pain (1 - 3)  aluminum hydroxide/magnesium hydroxide/simethicone Suspension 30 milliLiter(s) Oral every 4 hours PRN Dyspepsia  ibuprofen  Tablet. 400 milliGRAM(s) Oral every 6 hours PRN Moderate Pain (4 - 6)      RADIOLOGY & ADDITIONAL TESTS: Reviewed   Patient is a 23y old  Male who presents with a chief complaint of back abscess (11 Jul 2024 08:35)      HOSPITAL COURSE:  Patient's vanc trough on 7/1 was supratherapeutic and he was switched to vancomycin 1250 mg IV Q8 per pharmacy recommendations. Patient vanc trough is 14.5 on 7/3 within therapeutic rage. Patient will remain on current regimen until 7/19 with plans of disposition to a shelter. Vanc trough 7/7, 14.1 within therapeutic range, will continue with current dose of vanc. Patient reported having abdominal pain which started after dinner, was given Maalox. 7/12 Patient continuing vancomycin treatment, no acute medical events, although he was acting somewhat verbally aggressive in the morning and wanted to be left alone.       OVERNIGHT EVENTS: mary    SUBJECTIVE: no acute complaints, but patient was not amenable to a full examination this morning. He stated "get the hell out" several times and motioning to the door for me to leave.     ROS: otherwise negative      T(C): 36.4 (07-12-24 @ 05:35), Max: 37 (07-11-24 @ 21:05)  HR: 73 (07-12-24 @ 05:35) (62 - 75)  BP: 125/79 (07-12-24 @ 05:35) (107/62 - 128/77)  RR: 18 (07-12-24 @ 05:35) (18 - 18)  SpO2: 100% (07-12-24 @ 05:35) (98% - 100%)  Wt(kg): --Vital Signs Last 24 Hrs  T(C): 36.4 (12 Jul 2024 05:35), Max: 37 (11 Jul 2024 21:05)  T(F): 97.5 (12 Jul 2024 05:35), Max: 98.6 (11 Jul 2024 21:05)  HR: 73 (12 Jul 2024 05:35) (62 - 75)  BP: 125/79 (12 Jul 2024 05:35) (107/62 - 128/77)  BP(mean): --  RR: 18 (12 Jul 2024 05:35) (18 - 18)  SpO2: 100% (12 Jul 2024 05:35) (98% - 100%)    Parameters below as of 11 Jul 2024 21:05  Patient On (Oxygen Delivery Method): room air        PHYSICAL EXAM:  Constitutional: resting comfortably in bed with eye covers on; NAD  Head: NC/AT  Eyes: PERRL, EOMI, anicteric sclera  Neck: supple; no JVD or thyromegaly  Dermatologic: skin warm, dry and intact; no rashes, wounds, or scars  Neurologic: AAOx3  Psychiatric: affect and characteristics of appearance, verbalizations, behaviors are appropriate although somewhat confrontational    LABS:                CAPILLARY BLOOD GLUCOSE                MEDICATIONS  (STANDING):  enoxaparin Injectable 40 milliGRAM(s) SubCutaneous every 24 hours  nicotine -   7 mG/24Hr(s) Patch 1 Patch Transdermal daily  pantoprazole    Tablet 40 milliGRAM(s) Oral before breakfast  vancomycin  IVPB 1250 milliGRAM(s) IV Intermittent every 8 hours    MEDICATIONS  (PRN):  acetaminophen     Tablet .. 650 milliGRAM(s) Oral every 6 hours PRN Mild Pain (1 - 3)  aluminum hydroxide/magnesium hydroxide/simethicone Suspension 30 milliLiter(s) Oral every 4 hours PRN Dyspepsia  ibuprofen  Tablet. 400 milliGRAM(s) Oral every 6 hours PRN Moderate Pain (4 - 6)      RADIOLOGY & ADDITIONAL TESTS: Reviewed   Patient is a 23y old  Male who presents with a chief complaint of back abscess (11 Jul 2024 08:35)      HOSPITAL COURSE:  Patient's vanc trough on 7/1 was supratherapeutic and he was switched to vancomycin 1250 mg IV Q8 per pharmacy recommendations. Patient vanc trough is 14.5 on 7/3 within therapeutic rage. Patient will remain on current regimen until 7/19 with plans of disposition to a shelter. Vanc trough 7/7, 14.1 within therapeutic range, will continue with current dose of vanc. Patient reported having abdominal pain which started after dinner, was given Maalox. 7/12 Patient continuing vancomycin treatment, no acute medical events, although he was acting somewhat verbally aggressive in the morning and wanted to be left alone.       OVERNIGHT EVENTS: mary    SUBJECTIVE: no acute complaints, denied chest pain, SOB, back pain dizziness, nausea, vomiting However, patient was not amenable to a full examination this morning. He stated "get the hell out" several times and motioning to the door for me to leave. Tried again in afternoon and patient would still not let me proceed with a full examination.     ROS: otherwise negative      T(C): 36.4 (07-12-24 @ 05:35), Max: 37 (07-11-24 @ 21:05)  HR: 73 (07-12-24 @ 05:35) (62 - 75)  BP: 125/79 (07-12-24 @ 05:35) (107/62 - 128/77)  RR: 18 (07-12-24 @ 05:35) (18 - 18)  SpO2: 100% (07-12-24 @ 05:35) (98% - 100%)  Wt(kg): --Vital Signs Last 24 Hrs  T(C): 36.4 (12 Jul 2024 05:35), Max: 37 (11 Jul 2024 21:05)  T(F): 97.5 (12 Jul 2024 05:35), Max: 98.6 (11 Jul 2024 21:05)  HR: 73 (12 Jul 2024 05:35) (62 - 75)  BP: 125/79 (12 Jul 2024 05:35) (107/62 - 128/77)  BP(mean): --  RR: 18 (12 Jul 2024 05:35) (18 - 18)  SpO2: 100% (12 Jul 2024 05:35) (98% - 100%)    Parameters below as of 11 Jul 2024 21:05  Patient On (Oxygen Delivery Method): room air        PHYSICAL EXAM:  Constitutional: resting comfortably in bed; NAD  Head: NC/AT  Respiratory: CTA B/L; no W/R/R, no retractions  Cardiac: +S1/S2; RRR; no M/R/G  Gastrointestinal: soft, NT/ND; no rebound or guarding; +BSx4  Extremities: extremities grossly intact, no apparent swelling or edema  Dermatologic: skin warm, dry and intact; no rashes, wounds, or scars  Neurologic: AAOx3  Psychiatric: affect and characteristics of appearance, verbalizations, behaviors are appropriate      LABS:                CAPILLARY BLOOD GLUCOSE                MEDICATIONS  (STANDING):  enoxaparin Injectable 40 milliGRAM(s) SubCutaneous every 24 hours  nicotine -   7 mG/24Hr(s) Patch 1 Patch Transdermal daily  pantoprazole    Tablet 40 milliGRAM(s) Oral before breakfast  vancomycin  IVPB 1250 milliGRAM(s) IV Intermittent every 8 hours    MEDICATIONS  (PRN):  acetaminophen     Tablet .. 650 milliGRAM(s) Oral every 6 hours PRN Mild Pain (1 - 3)  aluminum hydroxide/magnesium hydroxide/simethicone Suspension 30 milliLiter(s) Oral every 4 hours PRN Dyspepsia  ibuprofen  Tablet. 400 milliGRAM(s) Oral every 6 hours PRN Moderate Pain (4 - 6)      RADIOLOGY & ADDITIONAL TESTS: Reviewed   Patient is a 23y old  Male who presents with a chief complaint of back abscess (11 Jul 2024 08:35)      HOSPITAL COURSE:  Patient's vanc trough on 7/1 was supratherapeutic and he was switched to vancomycin 1250 mg IV Q8 per pharmacy recommendations. Patient vanc trough is 14.5 on 7/3 within therapeutic rage. Patient will remain on current regimen until 7/19 with plans of disposition to a shelter. Vanc trough 7/7, 14.1 within therapeutic range, will continue with current dose of vanc. Patient reported having abdominal pain which started after dinner, was given Maalox. 7/12 Patient continuing vancomycin treatment, no acute medical events, although he was acting somewhat verbally aggressive in the morning and wanted to be left alone.       OVERNIGHT EVENTS: mary    SUBJECTIVE: no acute complaints, denied chest pain, SOB, back pain, dizziness, nausea, vomiting. However, patient was not amenable to a full examination this morning. He stated "get the hell out" several times and motioning to the door for me to leave. Tried again in afternoon and patient would still not let me proceed with a full examination.     ROS: otherwise negative      T(C): 36.4 (07-12-24 @ 05:35), Max: 37 (07-11-24 @ 21:05)  HR: 73 (07-12-24 @ 05:35) (62 - 75)  BP: 125/79 (07-12-24 @ 05:35) (107/62 - 128/77)  RR: 18 (07-12-24 @ 05:35) (18 - 18)  SpO2: 100% (07-12-24 @ 05:35) (98% - 100%)  Wt(kg): --Vital Signs Last 24 Hrs  T(C): 36.4 (12 Jul 2024 05:35), Max: 37 (11 Jul 2024 21:05)  T(F): 97.5 (12 Jul 2024 05:35), Max: 98.6 (11 Jul 2024 21:05)  HR: 73 (12 Jul 2024 05:35) (62 - 75)  BP: 125/79 (12 Jul 2024 05:35) (107/62 - 128/77)  BP(mean): --  RR: 18 (12 Jul 2024 05:35) (18 - 18)  SpO2: 100% (12 Jul 2024 05:35) (98% - 100%)    Parameters below as of 11 Jul 2024 21:05  Patient On (Oxygen Delivery Method): room air        PHYSICAL EXAM:  Constitutional: resting comfortably in bed; NAD  Head: NC/AT  Extremities: extremities grossly intact, no apparent swelling or edema  Dermatologic: skin grossly intact  Neurologic: AAOx3  Psychiatric: affect and characteristics of appearance, verbalizations, behaviors are appropriate      LABS:                CAPILLARY BLOOD GLUCOSE                MEDICATIONS  (STANDING):  enoxaparin Injectable 40 milliGRAM(s) SubCutaneous every 24 hours  nicotine -   7 mG/24Hr(s) Patch 1 Patch Transdermal daily  pantoprazole    Tablet 40 milliGRAM(s) Oral before breakfast  vancomycin  IVPB 1250 milliGRAM(s) IV Intermittent every 8 hours    MEDICATIONS  (PRN):  acetaminophen     Tablet .. 650 milliGRAM(s) Oral every 6 hours PRN Mild Pain (1 - 3)  aluminum hydroxide/magnesium hydroxide/simethicone Suspension 30 milliLiter(s) Oral every 4 hours PRN Dyspepsia  ibuprofen  Tablet. 400 milliGRAM(s) Oral every 6 hours PRN Moderate Pain (4 - 6)      RADIOLOGY & ADDITIONAL TESTS: Reviewed

## 2024-07-12 NOTE — DISCHARGE NOTE PROVIDER - NSDCFUSCHEDAPPT_GEN_ALL_CORE_FT
Darci Shaw  [facility]  INFDISEASE [address]  Scheduled Appointment: 07/24/2024

## 2024-07-12 NOTE — DISCHARGE NOTE PROVIDER - HOSPITAL COURSE
#Discharge: do not delete    23 year old man with hx of Drug abuse ( crack cocaine and meth ), admitted to the hospital with lower back cellulitis and MRSA bacteremia     Hospital course (by problem):   MRSA bacteremia.   ·  Plan: sepsis POA (WBC, HR); BCx 6/20 growing MRSA in 2/2 bottles; source likely skin (wound above gluteal cleft, drainage cx growing MRSA; folliculitis of extremities); no e/o abscess on CT L-spine; HIV negative; WBC normalized on vanc; surveillance BCx 6/21 NGTD; no e/o IE on TTE, pretest probability not sufficiently high to warrant AUBREY  -- cont. vanc 1250mg q8h, dose per level; goal trough 13-18; plan for 4-week course (through 7/19), per ID recs     Problem/Plan - 2:  ·  Problem: Polysubstance abuse.   ·  Plan: Pt. reports smoking crack cocaine and meth; he denies IVDA; Utox +cocaine, THC  -- monitor for withdrawal  -- SBIRT social work eval.    Patient was discharged to: Shelter     New medications:  Patient will remain on current regimen until 7/19 with plans of disposition to a shelter.  Changes to old medications:  Medications that were stopped:    Items to follow up as outpatient:    Physical exam at the time of discharge:       #Discharge: do not delete    23 year old man with hx of Drug abuse ( crack cocaine and meth ), admitted to the hospital with lower back cellulitis and MRSA bacteremia     Hospital course (by problem):   MRSA bacteremia.   ·  Plan: sepsis POA (WBC, HR); BCx 6/20 growing MRSA in 2/2 bottles; source likely skin (wound above gluteal cleft, drainage cx growing MRSA; folliculitis of extremities); no e/o abscess on CT L-spine; HIV negative; WBC normalized on vanc; surveillance BCx 6/21 NGTD; no e/o IE on TTE, pretest probability not sufficiently high to warrant AUBREY  -- cont. vanc 1250mg q8h, dose per level; goal trough 13-18; plan for 4-week course (through 7/19), per ID recs. Completed full course of antibiotics inpatient.      Problem/Plan - 2:  ·  Problem: Polysubstance abuse.   ·  Plan: Pt. reports smoking crack cocaine and meth; he denies IVDA; Utox +cocaine, THC  -- monitor for withdrawal  -- SBIRT social work eval.    Patient was discharged to: Shelter     New medications:  Patient will remain on current regimen until 7/19 with plans of disposition to a shelter.  Changes to old medications:  Medications that were stopped:    Items to follow up as outpatient: f/u ID appt with Dr. Darci Shaw on 11AM on July 24th on [address] 4th floor.    Physical exam at the time of discharge:  Constitutional: resting comfortably in bed; NAD  Head: NC/AT  Back: back cellulitis covered in bandage with no apparent erythema, swelling, or redness. bandage clean and dry  Extremities: limbs grossly intact and no apparent swelling or edema  Neurologic: AAOx3  Psychiatric: affect and characteristics of appearance, verbalizations, behaviors are appropriate

## 2024-07-12 NOTE — PROGRESS NOTE ADULT - ASSESSMENT
23 year old man with hx of Drug abuse ( crack cocaine and meth ), admitted to the hospital with lower back cellulitis and MRSA bacteremia     Impression and Plan  # MRSA Bacteremia   - source - back cellulitis   - Blood cx negative since 6/21, TTE without evidence of Endocarditis and given easily identifiable source, quick resolution of bacteremia and no other potential hardware for seeding he had a low pre test probability for Endocarditis and AUBREY is not warranted at this time   - Per ID 4 weeks of IV vancomycin , End Date 7/19/24   - needs vanc trough on 7/14, order CBC and CMP alongside trough    # Polysubstance abuse   - not in withdrawal     # GERD   - Continue PPI     # Hypokalemia   # Hypophosphatemia   - resolved after treatment     # Lovenox for DVT prophylaxis

## 2024-07-12 NOTE — PROGRESS NOTE ADULT - PROBLEM SELECTOR PLAN 1
sepsis POA (WBC, HR); BCx 6/20 growing MRSA in 2/2 bottles; source likely skin (wound above gluteal cleft, drainage cx growing MRSA; folliculitis of extremities); no e/o abscess on CT L-spine; HIV negative; WBC normalized on vanc; surveillance BCx 6/21 NGTD; no e/o IE on TTE, pretest probability not sufficiently high to warrant AUBREY  -- cont. vanc 1250mg q8h, dose per level; goal trough 13-18; plan for 4-week course (through 7/19), per ID recs  -- supportive care  -- contact precautions  -- f/u surveillance cultures  -- Vanc trough 14.5 on 7/3 within therapeutic range. continue current dose of Vanc.  -- Vanc trough 7/7 14.1.  -- Labs WNL  -- needs vanc trough on 7/14, order CBC and CMP alongside trough sepsis POA (WBC, HR); BCx 6/20 growing MRSA in 2/2 bottles; source likely skin (wound above gluteal cleft, drainage cx growing MRSA; folliculitis of extremities); no e/o abscess on CT L-spine; HIV negative; WBC normalized on vanc; surveillance BCx 6/21 NGTD; no e/o IE on TTE, pretest probability not sufficiently high to warrant AUBREY  -- cont. vanc 1250mg q8h, dose per level; goal trough 13-18; plan for 4-week course (through 7/19), per ID recs  -- supportive care  -- contact precautions  -- f/u surveillance cultures  -- Vanc trough 14.5 on 7/3 within therapeutic range. continue current dose of Vanc.  -- Vanc trough 7/7 14.1.  -- Labs WNL    -- needs vanc trough on 7/14, order CBC and CMP alongside trough

## 2024-07-12 NOTE — DISCHARGE NOTE PROVIDER - NSDCCPCAREPLAN_GEN_ALL_CORE_FT
PRINCIPAL DISCHARGE DIAGNOSIS  Diagnosis: Bacteremia  Assessment and Plan of Treatment:      PRINCIPAL DISCHARGE DIAGNOSIS  Diagnosis: Wound cellulitis  Assessment and Plan of Treatment: Cellulitis is a skin infection. The infected area is usually warm, red, swollen, and tender. It most commonly occurs on the lower body, such as the legs, feet, and toes, but this condition can occur on any part of the body. The infection can travel to the muscles, blood, and underlying tissue and become life-threatening without treatment. It is important to get medical treatment right away for this condition. Cellulitis is caused by bacteria. The bacteria enter through a break in the skin, such as a cut, burn, insect or animal bite, open sore, or crack. You have completed an extended course of IV antibiotics inpatient at [facility]. Please continue to follow up with Dr. Darci Shaw upon discharge  on July 24th on [address] 4th floor @ 11AM.

## 2024-07-13 PROCEDURE — 99232 SBSQ HOSP IP/OBS MODERATE 35: CPT | Mod: GC

## 2024-07-13 RX ADMIN — NICOTINE POLACRILEX 1 PATCH: 2 LOZENGE ORAL at 06:34

## 2024-07-13 RX ADMIN — VANCOMYCIN HYDROCHLORIDE 166.67 MILLIGRAM(S): KIT at 10:12

## 2024-07-13 RX ADMIN — Medication 400 MILLIGRAM(S): at 02:26

## 2024-07-13 RX ADMIN — VANCOMYCIN HYDROCHLORIDE 166.67 MILLIGRAM(S): KIT at 17:31

## 2024-07-13 RX ADMIN — Medication 400 MILLIGRAM(S): at 13:36

## 2024-07-13 RX ADMIN — ENOXAPARIN SODIUM 40 MILLIGRAM(S): 100 INJECTION SUBCUTANEOUS at 12:59

## 2024-07-13 RX ADMIN — NICOTINE POLACRILEX 1 PATCH: 2 LOZENGE ORAL at 12:41

## 2024-07-13 RX ADMIN — Medication 30 MILLILITER(S): at 12:58

## 2024-07-13 RX ADMIN — Medication 400 MILLIGRAM(S): at 12:36

## 2024-07-13 RX ADMIN — Medication 400 MILLIGRAM(S): at 03:18

## 2024-07-13 RX ADMIN — NICOTINE POLACRILEX 1 PATCH: 2 LOZENGE ORAL at 20:46

## 2024-07-13 RX ADMIN — Medication 30 MILLILITER(S): at 02:26

## 2024-07-13 RX ADMIN — VANCOMYCIN HYDROCHLORIDE 166.67 MILLIGRAM(S): KIT at 01:51

## 2024-07-13 RX ADMIN — NICOTINE POLACRILEX 1 PATCH: 2 LOZENGE ORAL at 12:36

## 2024-07-13 NOTE — PROGRESS NOTE ADULT - SUBJECTIVE AND OBJECTIVE BOX
Patient is a 23y old  Male who presents with a chief complaint of Lower back abscess (05 Jul 2024 06:49)        SUBJECTIVE:  Patient was seen and examined at bedside.    Overnight Events : resting in bed , denies pain , tolerating diet , no new complaints       Review of systems: 12 point Review of systems negative unless otherwise documented elsewhere in note.     Diet, Regular (06-21-24 @ 01:58) [Active]      MEDICATIONS:  MEDICATIONS  (STANDING):  enoxaparin Injectable 40 milliGRAM(s) SubCutaneous every 24 hours  nicotine -   7 mG/24Hr(s) Patch 1 Patch Transdermal daily  pantoprazole    Tablet 40 milliGRAM(s) Oral before breakfast  vancomycin  IVPB 1250 milliGRAM(s) IV Intermittent every 8 hours    MEDICATIONS  (PRN):  acetaminophen     Tablet .. 650 milliGRAM(s) Oral every 6 hours PRN Mild Pain (1 - 3)  aluminum hydroxide/magnesium hydroxide/simethicone Suspension 30 milliLiter(s) Oral every 4 hours PRN Dyspepsia  ibuprofen  Tablet. 400 milliGRAM(s) Oral every 6 hours PRN Moderate Pain (4 - 6)        Allergies    No Known Allergies    Intolerances        OBJECTIVE:  Vital Signs Last 24 Hrs  T(C): 36.6 (12 Jul 2024 21:00), Max: 36.7 (12 Jul 2024 14:56)  T(F): 97.9 (12 Jul 2024 21:00), Max: 98 (12 Jul 2024 14:56)  HR: 95 (12 Jul 2024 21:00) (68 - 95)  BP: 124/78 (12 Jul 2024 21:00) (106/59 - 124/78)  BP(mean): --  RR: 18 (12 Jul 2024 21:00) (18 - 18)  SpO2: 100% (12 Jul 2024 21:00) (97% - 100%)    Parameters below as of 12 Jul 2024 21:00  Patient On (Oxygen Delivery Method): room air            PHYSICAL EXAM:  Gen: Resting in bed at time of exam, not in distress   HEENT: moist mucosa, no lesions   Neck: supple, trachea at midline  CV: RRR, +S1/S2  Pulm: no wheezing , no crackles  no increase in work of breathing  Abd: soft, NTND  Skin: warm and dry, no new rashes   Ext: moving all 4 extremities spontaneously , no edema  ,  Neuro: AOx3, no gross focal neurological deficits  Psych: affect and behavior appropriate, pleasant at time of interview    LABS:

## 2024-07-13 NOTE — PROGRESS NOTE ADULT - NS ATTEST RISK PROBLEM GEN_ALL_CORE FT
Management of MRSA bacteremia with IV Vancomycin  , monitoring  back cellulitis
# MRSA Bacteremia   - source - back cellulitis   - Blood cx negative since 6/21 , TTE without evidence of Endocarditis and given easily identifiable source , quick resolution of bacteremia and no other potential hardware for seeding he had a low pre test probability for Endocarditis and AUBREY is not warranted at this time
# MRSA Bacteremia   - source - back cellulitis   - Blood cx negative since 6/21 , TTE without evidence of Endocarditis and given easily identifiable source , quick resolution of bacteremia and no other potential hardware for seeding he had a low pre test probability for Endocarditis and AUBREY is not warranted at this time.  - monitor vanc trough   - monitoring labs while on vancomycin

## 2024-07-14 PROCEDURE — 99232 SBSQ HOSP IP/OBS MODERATE 35: CPT | Mod: GC

## 2024-07-14 RX ORDER — OLANZAPINE 2.5 MG/1
5 TABLET, FILM COATED ORAL EVERY 8 HOURS
Refills: 0 | Status: DISCONTINUED | OUTPATIENT
Start: 2024-07-14 | End: 2024-07-19

## 2024-07-14 RX ADMIN — Medication 400 MILLIGRAM(S): at 01:48

## 2024-07-14 RX ADMIN — VANCOMYCIN HYDROCHLORIDE 166.67 MILLIGRAM(S): KIT at 11:17

## 2024-07-14 RX ADMIN — Medication 30 MILLILITER(S): at 10:55

## 2024-07-14 RX ADMIN — VANCOMYCIN HYDROCHLORIDE 166.67 MILLIGRAM(S): KIT at 18:06

## 2024-07-14 RX ADMIN — NICOTINE POLACRILEX 1 PATCH: 2 LOZENGE ORAL at 07:53

## 2024-07-14 RX ADMIN — VANCOMYCIN HYDROCHLORIDE 166.67 MILLIGRAM(S): KIT at 02:50

## 2024-07-14 RX ADMIN — Medication 400 MILLIGRAM(S): at 10:55

## 2024-07-14 RX ADMIN — NICOTINE POLACRILEX 1 PATCH: 2 LOZENGE ORAL at 12:00

## 2024-07-14 RX ADMIN — OLANZAPINE 5 MILLIGRAM(S): 2.5 TABLET, FILM COATED ORAL at 15:11

## 2024-07-14 RX ADMIN — PANTOPRAZOLE SODIUM 40 MILLIGRAM(S): 40 INJECTION, POWDER, FOR SOLUTION INTRAVENOUS at 09:28

## 2024-07-14 RX ADMIN — Medication 30 MILLILITER(S): at 01:50

## 2024-07-14 RX ADMIN — Medication 400 MILLIGRAM(S): at 02:00

## 2024-07-14 NOTE — H&P ADULT - SOCIAL HISTORY
[facility] Medicine Services  PROGRESS NOTE    Patient Name: Mariia Roberson  : 1940  MRN: [id number]    Date of Admission: 2024  Primary Care Physician: Susan Waters APRN    Subjective   Subjective     CC:  Dark stools    HPI:  Replaced K. Hg stable.  Does endorse back pain from kyphoplasty.  Blood pressures have been elevated will restart previously known home dose of amlodipine. No CP, SOA.       Objective   Objective     Vital Signs:   Temp:  [96.9 °F (36.1 °C)-98 °F (36.7 °C)] 96.9 °F (36.1 °C)  Heart Rate:  [66-92] 85  Resp:  [16-20] 18  BP: (102-194)/() 175/111  Flow (L/min):  [2] 2     Physical Exam:  Constitutional:  female no acute distress, awake, alert  HENT: NCAT, mucous membranes moist  Respiratory: Clear to auscultation bilaterally, respiratory effort normal   Cardiovascular: RRR, no murmurs, rubs, or gallops  Gastrointestinal: Positive bowel sounds, soft, nontender, nondistended  Musculoskeletal: No bilateral ankle edema  Psychiatric: Appropriate affect, cooperative  Neurologic: Oriented x 3, alert speech clear  Skin: No rashes    Results Reviewed:  LAB RESULTS:      Lab 24  0704 24  0043 246 24  1532 24  1047 24  0459 24  2318 24  1704   WBC 9.68  --   --   --   --  13.72*  --  16.20*   HEMOGLOBIN 9.9* 9.4* 9.4* 9.5* 11.1* 10.2*  10.2*   < > 10.7*   HEMATOCRIT 31.1* 29.9* 29.8* 30.5* 35.2 31.7*  31.7*   < > 33.8*   PLATELETS 412  --   --   --   --  388  --  457*   NEUTROS ABS  --   --   --   --   --  9.83*  --  11.45*   IMMATURE GRANS (ABS)  --   --   --   --   --  0.07*  --  0.07*   LYMPHS ABS  --   --   --   --   --  2.90  --  3.60*   MONOS ABS  --   --   --   --   --  0.74  --  0.84   EOS ABS  --   --   --   --   --  0.12  --  0.17   MCV 90.7  --   --   --   --  89.5  --  92.6   SED RATE  --   --   --   --   --   --   --  73*   CRP  --   --   --   --   --   --   --  7.63*  "  PROCALCITONIN  --   --   --   --   --   --   --  0.07   LACTATE  --   --   --   --   --   --   --  1.5   PROTIME  --   --   --   --   --  14.1  --   --    APTT  --   --   --   --   --  32.6  --   --    D DIMER QUANT  --   --   --   --   --   --   --  1.11*    < > = values in this interval not displayed.         Lab 07/14/24  0704 07/13/24  1532 07/13/24  0459 07/12/24  1715 07/12/24  1704   SODIUM 139 136 134*  --  138   POTASSIUM 3.3* 4.0 3.6  --  3.6   CHLORIDE 101 98 94*  --  97*   CO2 26.0 25.0 28.0  --  31.0*   ANION GAP 12.0 13.0 12.0  --  10.0   BUN 19 21 18  --  20   CREATININE 0.90 1.09* 1.01* 1.40* 1.32*   EGFR 62.4 49.6* 54.3*  --  39.4*   GLUCOSE 114* 123* 114*  --  131*   CALCIUM 8.2* 8.4* 8.5*  --  9.1   IONIZED CALCIUM 1.22  --   --   --   --    MAGNESIUM  --   --  1.6  --   --          Lab 07/12/24  1704   TOTAL PROTEIN 7.3   ALBUMIN 3.5   GLOBULIN 3.8   ALT (SGPT) 10   AST (SGOT) 16   BILIRUBIN 0.3   ALK PHOS 150*         Lab 07/13/24  0459 07/12/24  1704   PROBNP  --  970.8   PROTIME 14.1  --    INR 1.08  --              Lab 07/12/24  1853 07/12/24  1736   ABO TYPING A A   RH TYPING Positive Positive   ANTIBODY SCREEN  --  Negative         Brief Urine Lab Results  (Last result in the past 365 days)        Color   Clarity   Blood   Leuk Est   Nitrite   Protein   CREAT   Urine HCG        07/12/24 1640             94.1         07/12/24 1640 Yellow   Cloudy   Moderate (2+)   Moderate (2+)   Negative   100 mg/dL (2+)                   Cultures:  No results found for: \"BLOODCX\", \"URINECX\", \"WOUNDCX\", \"MRSACX\", \"RESPCX\", \"STOOLCX\"    Microbiology Results Abnormal       Procedure Component Value - Date/Time    Blood Culture - Blood, Arm, Left [[id number]]  (Normal) Collected: 07/12/24 1652    Lab Status: Preliminary result Specimen: Blood from Arm, Left Updated: 07/13/24 1832     Blood Culture No growth at 24 hours    Blood Culture - Blood, Arm, Right [[id number]]  (Normal) Collected: 07/12/24 1706    Lab " Status: Preliminary result Specimen: Blood from Arm, Right Updated: 07/13/24 1731     Blood Culture No growth at 24 hours    Urine Culture - Urine, Urine, Clean Catch [[id number]]  (Normal) Collected: 07/12/24 1640    Lab Status: Preliminary result Specimen: Urine, Clean Catch Updated: 07/13/24 1046     Urine Culture Culture in progress    Respiratory Panel PCR w/COVID-19(SARS-CoV-2) SANCHEZ/REYNALDO/ERLIN/PAD/COR/FELICIA In-House, NP Swab in UTM/VTM, 2 HR TAT - Swab, Nasopharynx [[id number]]  (Normal) Collected: 07/12/24 1738    Lab Status: Final result Specimen: Swab from Nasopharynx Updated: 07/1940     ADENOVIRUS, PCR Not Detected     Coronavirus 229E Not Detected     Coronavirus HKU1 Not Detected     Coronavirus NL63 Not Detected     Coronavirus OC43 Not Detected     COVID19 Not Detected     Human Metapneumovirus Not Detected     Human Rhinovirus/Enterovirus Not Detected     Influenza A PCR Not Detected     Influenza B PCR Not Detected     Parainfluenza Virus 1 Not Detected     Parainfluenza Virus 2 Not Detected     Parainfluenza Virus 3 Not Detected     Parainfluenza Virus 4 Not Detected     RSV, PCR Not Detected     Bordetella pertussis pcr Not Detected     Bordetella parapertussis PCR Not Detected     Chlamydophila pneumoniae PCR Not Detected     Mycoplasma pneumo by PCR Not Detected    Narrative:      In the setting of a positive respiratory panel with a viral infection PLUS a negative procalcitonin without other underlying concern for bacterial infection, consider observing off antibiotics or discontinuation of antibiotics and continue supportive care. If the respiratory panel is positive for atypical bacterial infection (Bordetella pertussis, Chlamydophila pneumoniae, or Mycoplasma pneumoniae), consider antibiotic de-escalation to target atypical bacterial infection.            CT Abdomen Pelvis With & Without Contrast    Result Date: 7/12/2024  CT ABDOMEN PELVIS W WO CONTRAST Date of Exam: 7/12/2024 6:03 PM EDT  Indication: GIB protocol. Comparison: None available. Technique: Axial CT images were obtained of the abdomen and pelvis before and after the uneventful intravenous administration of 75 cc Isovue-370 IV contrast . Sagittal and coronal reconstructions were performed.  Automated exposure control and iterative  reconstruction methods were used. FINDINGS: Lung bases: Mucoid impaction within both lower lobe bronchi. Fusiform 6.2 cm aneurysm of the descending thoracic aorta with continued more mild aneurysmal dilatation of the abdominal aorta. 3.1 cm fusiform aneurysm of the infrarenal abdominal aorta.. Liver:No masses. No intrahepatic biliary ductal dilatation. Spleen: Calcified granulomata Pancreas:No pancreatic masses. No evidence of pancreatitis. Gallbladder and common bile duct:No evidence of cholelithiasis. No evidence of cholecystitis. Adrenal glands:No adrenal masses Kidneys and ureters: Significantly diminished enhancement of the right kidney with severe atheromatous disease at the origin of the right kidney. The right kidney appears atrophic. Nonobstructing right renal calculus. No calculi present within the ureters. Normal caliber ureters. Urinary bladder:No urinary bladder wall thickening. No bladder masses. Small bowel: Small focus of enhancement involving the anterior gastric wall may represent the source of the GI bleed. Large bowel: Extensive colonic diverticulosis without evidence of diverticulitis. Appendix: Normal GENITOURINARY: Normal appearance of the uterus Ascites or pneumoperitoneum:None. Adenopathy:None present Osseous structures: Left hip replacement. Prior gamma nailing of a right hip fracture. Degenerative changes of the hips. Degenerative changes of the lumbar spine. Prior vertebroplasty of T11. No lytic or blastic disease. Other findings: None     Impression: 1. Small focus of enhancement involving the anterior gastric wall may represent the origin of the patient's GI bleed. 2. There is a 6.2  cm fusiform aneurysm of the descending thoracic aorta with continuation of the aneurysm into the upper abdomen. There is a 3.1 cm aneurysm of the infrarenal abdominal aorta. 3. Significantly diminished enhancement of the right kidney with severe atheromatous disease at the origin of the right renal artery. Right renal atrophy. Findings are consistent with chronic severe atheromatous disease at the right renal artery origin. Electronically Signed: Roly Garcia MD  7/12/2024 6:46 PM EDT  Workstation ID: [id number]    XR Chest 1 View    Result Date: 7/12/2024  XR CHEST 1 VW Date of Exam: 7/12/2024 5:55 PM EDT Indication: cough Comparison: 7/3/2024 at 0910 hours FINDINGS: No new consolidations or pleural effusions are observed. The cardiac silhouette and mediastinum are stable. No acute osseous abnormalities are identified. Kyphoplasty/vertebroplasty changes are noted.     Impression: There is no significant change when compared to the prior study. There is no evidence for acute cardiopulmonary process. Electronically Signed: Murphy Hooker MD  7/12/2024 6:21 PM EDT  Workstation ID: [id number]         Current medications:  Scheduled Meds:acetaminophen, 1,000 mg, Oral, TID  amLODIPine, 5 mg, Oral, Q24H  atorvastatin, 10 mg, Oral, Nightly  brimonidine, 1 drop, Right Eye, BID  busPIRone, 5 mg, Oral, BID  dorzolamide-timolol, 1 drop, Right Eye, BID  doxycycline, 100 mg, Oral, BID  ferrous sulfate, 325 mg, Oral, Daily With Lunch  ipratropium-albuterol, 3 mL, Nebulization, 4x Daily - RT  latanoprost, 1 drop, Both Eyes, Nightly  Lidocaine, 2 patch, Transdermal, Q24H  [Transfer Hold] pantoprazole, 40 mg, Oral, Q AM  sodium chloride, 10 mL, Intravenous, Q12H  traZODone, 50 mg, Oral, Nightly      Continuous Infusions:sodium chloride, 50 mL/hr, Last Rate: 50 mL/hr (07/13/24 1636)      PRN Meds:.  ipratropium-albuterol    melatonin    oxyCODONE    sodium chloride    sodium chloride    sodium chloride    Assessment & Plan   Assessment  & Plan     Active Hospital Problems    Diagnosis  POA    **Melena [K92.1]  Yes    Pyuria [R82.81]  Yes    Leukocytosis [D72.829]  Yes    GIB (gastrointestinal bleeding) [K92.2]  Yes    Acute blood loss anemia [D62]  Yes    Cough [R05.9]  Yes    AAA (abdominal aortic aneurysm) [I71.40]  Yes    Previous back surgery [Z98.890]  Not Applicable    CHF (congestive heart failure) [I50.9]  Yes    Acute kidney injury [N17.9]  Yes    Anxiety [F41.9]  Yes    HTN (hypertension) [I10]  Yes      Resolved Hospital Problems   No resolved problems to display.        Brief Hospital Course to date:   Melena    HTN (hypertension)    Pyuria    Leukocytosis    GIB (gastrointestinal bleeding)    Acute blood loss anemia    Cough    AAA (abdominal aortic aneurysm)    Previous back surgery    CHF (congestive heart failure)    Acute kidney injury    Anxiety     Mariia Roberson is a 84 y.o. 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 Perez) who presented to the ED w/ c/o melena.      **Acute gastrointestinal hemorrhage   **Acute blood loss anemia   **Hx of iron deficiency anemia   -no prior hx of GIB, no prior hx of colonoscopy or EGD   -CT abd/pel obtained; results above (Small focus of enhancement involving the anterior gastric wall may represent the origin of the patient's GI bleed)  -trend H/H  -IV Protonix   -reports taking a baby ASA daily; hold  -iron supplement continued   -coags reviewed  -symptom mgt  -07/13: EGD: no acute findings   -07/14: Hemoglobin stable     **Acute kidney injury   -likely in setting of CKD   -per CT abd/pel: Significantly diminished enhancement of the right kidney with severe atheromatous disease at the origin of the right renal artery. Right renal atrophy. Findings are consistent with chronic severe atheromatous disease at the right renal artery origin.   -previous creatinine in 1/2024 normal at 1.0  -during recent  "admission creatinine trend: 1.10-1.34 (1.10 at d/c on 7/2/24)  -current creatinine 1.40 w/ eGFR 39.4  -UA c/w pyuria; urine cx pending   -urine studies pending   -NS @ 75ml/hour x 12 hours   -07/14: will restart Amlodipine. Will hold Losartan and Lasix for now as RADHA just resolved.  Daughter reports poor water intake at home.      **Cough   **Elevated D-dimer on admission, recent operation   -started on Doxycyline (by PCP) on 7/9 for URI; cough has improved   -continue Doxycyline   -respiratory panel PCR negative   -CXR negative   -proBNP reviewed   -currently 94% on room air   -symptom mgt   -BL LE DVT US and NM study pending      **Pyuria   -pt denies urinary symptoms (family reports that pt has difficulty w/ starting urine stream, describing that she \"strains\")  -UA c/w pyuria; urine cx pending   -bladder scan w/ PVR     **Leukocytosis   -currently WBC 16.20 (12.05 on 7/2)-> downtrending  -afebrile   -lactic acid and procal both WNL  -sed rate and crp pending   -UA w/ pyuria; no urinary symptoms; cx pending  -CXR unremarkable   -Doxy continued for cough (started by PCP ~4 days ago); cough improved      **S/p Vertebroplasty 2/2 compression fx at T9 and T11  -7/1/24, Dr. Boyd   -d/c home on 7/3 w/ Home Health   -has f/u ~ 24th of this month w/ NS  -continues to c/o pain; prn oxycodone continued   -fall precautions   -pt/ot and case mgt consult       **AAA  -per CT abd/pel: 6.2 cm fusiform aneurysm of the descending thoracic aorta with continuation of the aneurysm into the upper abdomen. There is a 3.1 cm aneurysm of the infrarenal abdominal aorta   -previous CT in [id number] w/ reported AAA  -Spoke with Dr. Maldonado, CTS, no intervention. Needs to follow up OP       **HTN   **HLD   **CHF (data deficient)   -Reviewed EKG  -proBNP pending   -routine lasix and losartan held   -statin continued   -monitor I/Os      **Anxiety   -continue Trazodone and Buspar       Expected Discharge Location and Transportation: home with " assist  Expected Discharge 07/15/2024  Expected Discharge Date: 7/14/2024; Expected Discharge Time:      VTE Prophylaxis:  Mechanical VTE prophylaxis orders are present.         AM-PAC 6 Clicks Score (PT): 18 (07/13/24 2023)    CODE STATUS:   Code Status and Medical Interventions:   Ordered at: 07/12/24 2037     Medical Intervention Limits:    No intubation (DNI)     Level Of Support Discussed With:    Patient     Code Status (Patient has no pulse and is not breathing):    No CPR (Do Not Attempt to Resuscitate)     Medical Interventions (Patient has pulse or is breathing):    Limited Support     Comments:    DNR/DNI       Maddie Henry MD  07/14/24       Yes

## 2024-07-14 NOTE — PROGRESS NOTE ADULT - PROBLEM SELECTOR PLAN 1
sepsis POA (WBC, HR); BCx 6/20 growing MRSA in 2/2 bottles; source likely skin (wound above gluteal cleft, drainage cx growing MRSA; folliculitis of extremities); no e/o abscess on CT L-spine; HIV negative; WBC normalized on vanc; surveillance BCx 6/21 NGTD; no e/o IE on TTE, pretest probability not sufficiently high to warrant AUBREY  -- cont. vanc 1250mg q8h, dose per level; goal trough 13-18; plan for 4-week course (through 7/19), per ID recs  -- supportive care  -- contact precautions  -- f/u surveillance cultures  -- Vanc trough 14.5 on 7/3 within therapeutic range. continue current dose of Vanc.  -- Vanc trough 7/7 14.1.  -- Labs WNL    -- f/u vanc trough, CBC, CMP on 7/15

## 2024-07-14 NOTE — PROGRESS NOTE ADULT - SUBJECTIVE AND OBJECTIVE BOX
Patient is a 23y old  Male who presents with a chief complaint of Bacteremia (13 Jul 2024 09:13)      HOSPITAL COURSE: Patient's vanc trough on 7/1 was supratherapeutic and he was switched to vancomycin 1250 mg IV Q8 per pharmacy recommendations. Patient vanc trough is 14.5 on 7/3 within therapeutic rage. Patient will remain on current regimen until 7/19 with plans of disposition to a shelter. Vanc trough 7/7, 14.1 within therapeutic range, will continue with current dose of vanc. Patient reported having abdominal pain which started after dinner, was given Maalox. 7/12 Patient continuing vancomycin treatment, no acute medical events, although he was acting somewhat verbally aggressive in the morning and wanted to be left alone. 7/14 in the afternoon, patient was acting aggressive again today, code grey called, LENNIE outside his room. ordered Zyprexa 5 mg PRN every 8 hours for inability to redirect and aggression and agitation. if patient continues this behavior, can discharge per attending recommendation. patient was given Zyprexa 5 mg IM at 15:00 and was very amenable to receiving the dose of medication; leil[place]ank was there as back up for the entire situation but he was totally fine getting the injection.       OVERNIGHT EVENTS: mary    SUBJECTIVE: no complaints    ROS: otherwise negative      T(C): 36.6 (07-14-24 @ 18:15), Max: 36.8 (07-14-24 @ 00:37)  HR: 65 (07-14-24 @ 18:15) (65 - 84)  BP: 100/66 (07-14-24 @ 18:15) (100/61 - 113/54)  RR: 18 (07-14-24 @ 18:15) (18 - 18)  SpO2: 98% (07-14-24 @ 18:15) (97% - 99%)  Wt(kg): --Vital Signs Last 24 Hrs  T(C): 36.6 (14 Jul 2024 18:15), Max: 36.8 (14 Jul 2024 00:37)  T(F): 97.8 (14 Jul 2024 18:15), Max: 98.3 (14 Jul 2024 05:44)  HR: 65 (14 Jul 2024 18:15) (65 - 84)  BP: 100/66 (14 Jul 2024 18:15) (100/61 - 113/54)  BP(mean): --  RR: 18 (14 Jul 2024 18:15) (18 - 18)  SpO2: 98% (14 Jul 2024 18:15) (97% - 99%)    Parameters below as of 14 Jul 2024 18:15  Patient On (Oxygen Delivery Method): room air        PHYSICAL EXAM:  Constitutional: resting comfortably in bed; NAD  Head: NC/AT  Extremities: WWP, no clubbing or cyanosis; no peripheral edema  Musculoskeletal: limbs grossly normal upon examination, no apparent swelling or edema  Vascular: 2+ radial, DP/PT pulses B/L  Neurologic: AAOx3  Psychiatric: affect and characteristics of appearance, verbalizations, behaviors are appropriate    LABS:                CAPILLARY BLOOD GLUCOSE                MEDICATIONS  (STANDING):  enoxaparin Injectable 40 milliGRAM(s) SubCutaneous every 24 hours  nicotine -   7 mG/24Hr(s) Patch 1 Patch Transdermal daily  pantoprazole    Tablet 40 milliGRAM(s) Oral before breakfast  vancomycin  IVPB 1250 milliGRAM(s) IV Intermittent every 8 hours    MEDICATIONS  (PRN):  acetaminophen     Tablet .. 650 milliGRAM(s) Oral every 6 hours PRN Mild Pain (1 - 3)  aluminum hydroxide/magnesium hydroxide/simethicone Suspension 30 milliLiter(s) Oral every 4 hours PRN Dyspepsia  ibuprofen  Tablet. 400 milliGRAM(s) Oral every 6 hours PRN Moderate Pain (4 - 6)  OLANZapine Injectable 5 milliGRAM(s) IntraMuscular every 8 hours PRN agitation, aggresion without redirectability      RADIOLOGY & ADDITIONAL TESTS: Reviewed

## 2024-07-15 LAB
ANION GAP SERPL CALC-SCNC: 13 MMOL/L — SIGNIFICANT CHANGE UP (ref 5–17)
BUN SERPL-MCNC: 17 MG/DL — SIGNIFICANT CHANGE UP (ref 7–23)
CALCIUM SERPL-MCNC: 9.1 MG/DL — SIGNIFICANT CHANGE UP (ref 8.4–10.5)
CHLORIDE SERPL-SCNC: 103 MMOL/L — SIGNIFICANT CHANGE UP (ref 96–108)
CO2 SERPL-SCNC: 25 MMOL/L — SIGNIFICANT CHANGE UP (ref 22–31)
CREAT SERPL-MCNC: 0.96 MG/DL — SIGNIFICANT CHANGE UP (ref 0.5–1.3)
EGFR: 112 ML/MIN/1.73M2 — SIGNIFICANT CHANGE UP
GLUCOSE SERPL-MCNC: 182 MG/DL — HIGH (ref 70–99)
HCT VFR BLD CALC: 43.5 % — SIGNIFICANT CHANGE UP (ref 39–50)
HGB BLD-MCNC: 14.4 G/DL — SIGNIFICANT CHANGE UP (ref 13–17)
MCHC RBC-ENTMCNC: 30.7 PG — SIGNIFICANT CHANGE UP (ref 27–34)
MCHC RBC-ENTMCNC: 33.1 GM/DL — SIGNIFICANT CHANGE UP (ref 32–36)
MCV RBC AUTO: 92.8 FL — SIGNIFICANT CHANGE UP (ref 80–100)
NRBC # BLD: 0 /100 WBCS — SIGNIFICANT CHANGE UP (ref 0–0)
PLATELET # BLD AUTO: 215 K/UL — SIGNIFICANT CHANGE UP (ref 150–400)
POTASSIUM SERPL-MCNC: 3.7 MMOL/L — SIGNIFICANT CHANGE UP (ref 3.5–5.3)
POTASSIUM SERPL-SCNC: 3.7 MMOL/L — SIGNIFICANT CHANGE UP (ref 3.5–5.3)
RBC # BLD: 4.69 M/UL — SIGNIFICANT CHANGE UP (ref 4.2–5.8)
RBC # FLD: 12.7 % — SIGNIFICANT CHANGE UP (ref 10.3–14.5)
SODIUM SERPL-SCNC: 141 MMOL/L — SIGNIFICANT CHANGE UP (ref 135–145)
VANCOMYCIN TROUGH SERPL-MCNC: 14.9 UG/ML — SIGNIFICANT CHANGE UP (ref 10–20)
WBC # BLD: 5.54 K/UL — SIGNIFICANT CHANGE UP (ref 3.8–10.5)
WBC # FLD AUTO: 5.54 K/UL — SIGNIFICANT CHANGE UP (ref 3.8–10.5)

## 2024-07-15 PROCEDURE — 99232 SBSQ HOSP IP/OBS MODERATE 35: CPT | Mod: GC

## 2024-07-15 RX ADMIN — OLANZAPINE 5 MILLIGRAM(S): 2.5 TABLET, FILM COATED ORAL at 04:17

## 2024-07-15 RX ADMIN — PANTOPRAZOLE SODIUM 40 MILLIGRAM(S): 40 INJECTION, POWDER, FOR SOLUTION INTRAVENOUS at 05:51

## 2024-07-15 RX ADMIN — Medication 400 MILLIGRAM(S): at 05:51

## 2024-07-15 RX ADMIN — NICOTINE POLACRILEX 1 PATCH: 2 LOZENGE ORAL at 14:50

## 2024-07-15 RX ADMIN — Medication 30 MILLILITER(S): at 22:04

## 2024-07-15 RX ADMIN — Medication 400 MILLIGRAM(S): at 23:05

## 2024-07-15 RX ADMIN — Medication 400 MILLIGRAM(S): at 22:05

## 2024-07-15 RX ADMIN — ENOXAPARIN SODIUM 40 MILLIGRAM(S): 100 INJECTION SUBCUTANEOUS at 14:50

## 2024-07-15 RX ADMIN — VANCOMYCIN HYDROCHLORIDE 166.67 MILLIGRAM(S): KIT at 22:04

## 2024-07-15 RX ADMIN — VANCOMYCIN HYDROCHLORIDE 166.67 MILLIGRAM(S): KIT at 05:37

## 2024-07-15 RX ADMIN — Medication 30 MILLILITER(S): at 05:51

## 2024-07-15 RX ADMIN — VANCOMYCIN HYDROCHLORIDE 166.67 MILLIGRAM(S): KIT at 14:50

## 2024-07-15 NOTE — PROGRESS NOTE ADULT - SUBJECTIVE AND OBJECTIVE BOX
Patient is a 23y old  Male who presents with a chief complaint of back abscess (14 Jul 2024 19:07)      HOSPITAL COURSE: Patient's vanc trough on 7/1 was supratherapeutic and he was switched to vancomycin 1250 mg IV Q8 per pharmacy recommendations. Patient vanc trough is 14.5 on 7/3 within therapeutic rage. Patient will remain on current regimen until 7/19 with plans of disposition to a shelter. Vanc trough 7/7, 14.1 within therapeutic range, will continue with current dose of vanc. Patient reported having abdominal pain which started after dinner, was given Maalox. 7/12 Patient continuing vancomycin treatment, no acute medical events, although he was acting somewhat verbally aggressive in the morning and wanted to be left alone. 7/14 in the afternoon, patient was acting aggressive again today, code grey called, LENNIE outside his room. ordered Zyprexa 5 mg PRN every 8 hours for inability to redirect and aggression and agitation. if patient continues this behavior, can discharge per attending recommendation. patient was given Zyprexa 5 mg IM at 15:00 and was very amenable to receiving the dose of medication; [place] was there as back up for the entire situation but he was totally fine getting the injection.     OVERNIGHT EVENTS: mary    SUBJECTIVE: on examination patient awake and talking and endorsed no subjective complaints. denied chest pain, SOB, back pain, fever, chills.    ROS: otherwise negative      T(C): 36.8 (07-15-24 @ 05:56), Max: 36.8 (07-15-24 @ 05:56)  HR: 90 (07-15-24 @ 05:56) (65 - 90)  BP: 103/57 (07-15-24 @ 05:56) (100/61 - 103/57)  RR: 18 (07-15-24 @ 05:56) (18 - 18)  SpO2: 95% (07-15-24 @ 05:56) (95% - 99%)  Wt(kg): --Vital Signs Last 24 Hrs  T(C): 36.8 (15 Jul 2024 05:56), Max: 36.8 (15 Jul 2024 05:56)  T(F): 98.2 (15 Jul 2024 05:56), Max: 98.2 (15 Jul 2024 05:56)  HR: 90 (15 Jul 2024 05:56) (65 - 90)  BP: 103/57 (15 Jul 2024 05:56) (100/61 - 103/57)  BP(mean): --  RR: 18 (15 Jul 2024 05:56) (18 - 18)  SpO2: 95% (15 Jul 2024 05:56) (95% - 99%)    Parameters below as of 14 Jul 2024 18:15  Patient On (Oxygen Delivery Method): room air        PHYSICAL EXAM:  Constitutional: resting comfortably in bed; NAD  Head: NC/AT  Eyes: PERRL, EOMI, anicteric sclera  Respiratory: CTA B/L; no W/R/R, no retractions  Cardiac: +S1/S2; RRR; no M/R/G  Extremities: WWP, no clubbing or cyanosis; no peripheral edema. Capillary refill <2 sec  Musculoskeletal: NROM x4; no joint swelling, tenderness or erythema  Vascular: 2+ radial, DP/PT pulses B/L  Dermatologic: skin warm, dry and intact; no rashes, wounds, or scars  Neurologic: AAOx3    LABS:                CAPILLARY BLOOD GLUCOSE                MEDICATIONS  (STANDING):  enoxaparin Injectable 40 milliGRAM(s) SubCutaneous every 24 hours  nicotine -   7 mG/24Hr(s) Patch 1 Patch Transdermal daily  pantoprazole    Tablet 40 milliGRAM(s) Oral before breakfast  vancomycin  IVPB 1250 milliGRAM(s) IV Intermittent every 8 hours    MEDICATIONS  (PRN):  acetaminophen     Tablet .. 650 milliGRAM(s) Oral every 6 hours PRN Mild Pain (1 - 3)  aluminum hydroxide/magnesium hydroxide/simethicone Suspension 30 milliLiter(s) Oral every 4 hours PRN Dyspepsia  ibuprofen  Tablet. 400 milliGRAM(s) Oral every 6 hours PRN Moderate Pain (4 - 6)  OLANZapine Injectable 5 milliGRAM(s) IntraMuscular every 8 hours PRN agitation, aggresion without redirectability      RADIOLOGY & ADDITIONAL TESTS: Reviewed     Patient is a 23y old  Male who presents with a chief complaint of back abscess (14 Jul 2024 19:07)      HOSPITAL COURSE: Patient's vanc trough on 7/1 was supratherapeutic and he was switched to vancomycin 1250 mg IV Q8 per pharmacy recommendations. Patient vanc trough is 14.5 on 7/3 within therapeutic rage. Patient will remain on current regimen until 7/19 with plans of disposition to a shelter. Vanc trough 7/7, 14.1 within therapeutic range, will continue with current dose of vanc. Patient reported having abdominal pain which started after dinner, was given Maalox. 7/12 Patient continuing vancomycin treatment, no acute medical events, although he was acting somewhat verbally aggressive in the morning and wanted to be left alone. 7/14 in the afternoon, patient was acting aggressive again today, code grey called, LENNIE outside his room. ordered Zyprexa 5 mg PRN every 8 hours for inability to redirect and aggression and agitation. if patient continues this behavior, can discharge per attending recommendation. patient was given Zyprexa 5 mg IM at 15:00 and was very amenable to receiving the dose of medication; security was there as back up for the entire situation but he was totally fine getting the injection.     OVERNIGHT EVENTS: mary    SUBJECTIVE: on examination patient awake and talking and endorsed no subjective complaints. denied chest pain, SOB, back pain, fever, chills. notably, patient was suppose to get vanc trough, cbc, bmp today but he refused blood draw according to nursing. discussed the risks of not getting vanc trough, cbc, cmp with him, including suboptimal treatment of infection, spread of infection, sepsis, death, but he still was not amenable to having blood drawn.    ROS: otherwise negative      T(C): 36.8 (07-15-24 @ 05:56), Max: 36.8 (07-15-24 @ 05:56)  HR: 90 (07-15-24 @ 05:56) (65 - 90)  BP: 103/57 (07-15-24 @ 05:56) (100/61 - 103/57)  RR: 18 (07-15-24 @ 05:56) (18 - 18)  SpO2: 95% (07-15-24 @ 05:56) (95% - 99%)  Wt(kg): --Vital Signs Last 24 Hrs  T(C): 36.8 (15 Jul 2024 05:56), Max: 36.8 (15 Jul 2024 05:56)  T(F): 98.2 (15 Jul 2024 05:56), Max: 98.2 (15 Jul 2024 05:56)  HR: 90 (15 Jul 2024 05:56) (65 - 90)  BP: 103/57 (15 Jul 2024 05:56) (100/61 - 103/57)  BP(mean): --  RR: 18 (15 Jul 2024 05:56) (18 - 18)  SpO2: 95% (15 Jul 2024 05:56) (95% - 99%)    Parameters below as of 14 Jul 2024 18:15  Patient On (Oxygen Delivery Method): room air        PHYSICAL EXAM:  Constitutional: resting comfortably in bed; NAD  Head: NC/AT  Extremities: no gross deformities lesions, or edema  Dermatologic: skin warm, dry and intact; no rashes, wounds, or scars  Neurologic: AAOx3    LABS:                CAPILLARY BLOOD GLUCOSE                MEDICATIONS  (STANDING):  enoxaparin Injectable 40 milliGRAM(s) SubCutaneous every 24 hours  nicotine -   7 mG/24Hr(s) Patch 1 Patch Transdermal daily  pantoprazole    Tablet 40 milliGRAM(s) Oral before breakfast  vancomycin  IVPB 1250 milliGRAM(s) IV Intermittent every 8 hours    MEDICATIONS  (PRN):  acetaminophen     Tablet .. 650 milliGRAM(s) Oral every 6 hours PRN Mild Pain (1 - 3)  aluminum hydroxide/magnesium hydroxide/simethicone Suspension 30 milliLiter(s) Oral every 4 hours PRN Dyspepsia  ibuprofen  Tablet. 400 milliGRAM(s) Oral every 6 hours PRN Moderate Pain (4 - 6)  OLANZapine Injectable 5 milliGRAM(s) IntraMuscular every 8 hours PRN agitation, aggresion without redirectability      RADIOLOGY & ADDITIONAL TESTS: Reviewed     Patient is a 23y old  Male who presents with a chief complaint of back abscess (14 Jul 2024 19:07)      HOSPITAL COURSE: Patient's vanc trough on 7/1 was supratherapeutic and he was switched to vancomycin 1250 mg IV Q8 per pharmacy recommendations. Patient vanc trough is 14.5 on 7/3 within therapeutic rage. Patient will remain on current regimen until 7/19 with plans of disposition to a shelter. Vanc trough 7/7, 14.1 within therapeutic range, will continue with current dose of vanc. Patient reported having abdominal pain which started after dinner, was given Maalox. 7/12 Patient continuing vancomycin treatment, no acute medical events, although he was acting somewhat verbally aggressive in the morning and wanted to be left alone. 7/14 in the afternoon, patient was acting aggressive again today, code grey called, LENNIE outside his room. ordered Zyprexa 5 mg PRN every 8 hours for inability to redirect and aggression and agitation. if patient continues this behavior, can discharge per attending recommendation. patient was given Zyprexa 5 mg IM at 15:00 and was very amenable to receiving the dose of medication; security was there as back up for the entire situation but he was totally fine getting the injection.     OVERNIGHT EVENTS: per nursing, he was being agitated again over night and received a second dose of Zyprexa 5mg IM in early morning today.     SUBJECTIVE: on examination patient awake and talking and endorsed no subjective complaints. denied chest pain, SOB, back pain, fever, chills. notably, patient was suppose to get vanc trough, cbc, bmp today but he refused blood draw according to nursing. discussed the risks of not getting vanc trough, cbc, cmp with him, including suboptimal treatment of infection, spread of infection, sepsis, death, but he still was not amenable to having blood drawn.    ROS: otherwise negative      T(C): 36.8 (07-15-24 @ 05:56), Max: 36.8 (07-15-24 @ 05:56)  HR: 90 (07-15-24 @ 05:56) (65 - 90)  BP: 103/57 (07-15-24 @ 05:56) (100/61 - 103/57)  RR: 18 (07-15-24 @ 05:56) (18 - 18)  SpO2: 95% (07-15-24 @ 05:56) (95% - 99%)  Wt(kg): --Vital Signs Last 24 Hrs  T(C): 36.8 (15 Jul 2024 05:56), Max: 36.8 (15 Jul 2024 05:56)  T(F): 98.2 (15 Jul 2024 05:56), Max: 98.2 (15 Jul 2024 05:56)  HR: 90 (15 Jul 2024 05:56) (65 - 90)  BP: 103/57 (15 Jul 2024 05:56) (100/61 - 103/57)  BP(mean): --  RR: 18 (15 Jul 2024 05:56) (18 - 18)  SpO2: 95% (15 Jul 2024 05:56) (95% - 99%)    Parameters below as of 14 Jul 2024 18:15  Patient On (Oxygen Delivery Method): room air        PHYSICAL EXAM:  Constitutional: resting comfortably in bed; NAD  Head: NC/AT  Extremities: no gross deformities lesions, or edema  Dermatologic: skin warm, dry and intact; no rashes, wounds, or scars  Neurologic: AAOx3    LABS:                CAPILLARY BLOOD GLUCOSE                MEDICATIONS  (STANDING):  enoxaparin Injectable 40 milliGRAM(s) SubCutaneous every 24 hours  nicotine -   7 mG/24Hr(s) Patch 1 Patch Transdermal daily  pantoprazole    Tablet 40 milliGRAM(s) Oral before breakfast  vancomycin  IVPB 1250 milliGRAM(s) IV Intermittent every 8 hours    MEDICATIONS  (PRN):  acetaminophen     Tablet .. 650 milliGRAM(s) Oral every 6 hours PRN Mild Pain (1 - 3)  aluminum hydroxide/magnesium hydroxide/simethicone Suspension 30 milliLiter(s) Oral every 4 hours PRN Dyspepsia  ibuprofen  Tablet. 400 milliGRAM(s) Oral every 6 hours PRN Moderate Pain (4 - 6)  OLANZapine Injectable 5 milliGRAM(s) IntraMuscular every 8 hours PRN agitation, aggresion without redirectability      RADIOLOGY & ADDITIONAL TESTS: Reviewed     Patient is a 23y old  Male who presents with a chief complaint of back abscess (14 Jul 2024 19:07)      HOSPITAL COURSE: Patient's vanc trough on 7/1 was supratherapeutic and he was switched to vancomycin 1250 mg IV Q8 per pharmacy recommendations. Patient vanc trough is 14.5 on 7/3 within therapeutic rage. Patient will remain on current regimen until 7/19 with plans of disposition to a shelter. Vanc trough 7/7, 14.1 within therapeutic range, will continue with current dose of vanc. Patient reported having abdominal pain which started after dinner, was given Maalox. 7/12 Patient continuing vancomycin treatment, no acute medical events, although he was acting somewhat verbally aggressive in the morning and wanted to be left alone. 7/14 in the afternoon, patient was acting aggressive again today, code grey called, LENNIE outside his room. ordered Zyprexa 5 mg PRN every 8 hours for inability to redirect and aggression and agitation. patient was given Zyprexa 5 mg IM at 15:00 and was very amenable to receiving the dose of medication; security was there as back up for the entire situation but he was totally fine getting the injection.     OVERNIGHT EVENTS: per nursing, he was being agitated again over night and received a second dose of Zyprexa 5mg IM in early morning today.     SUBJECTIVE: on examination patient awake and talking and endorsed no subjective complaints. denied chest pain, SOB, back pain, fever, chills. notably, patient was suppose to get vanc trough, cbc, bmp today but he refused blood draw according to nursing. discussed the risks of not getting vanc trough, cbc, cmp with him, including suboptimal treatment of infection, spread of infection, sepsis, death, but he still was not amenable to having blood drawn.    ROS: otherwise negative      T(C): 36.8 (07-15-24 @ 05:56), Max: 36.8 (07-15-24 @ 05:56)  HR: 90 (07-15-24 @ 05:56) (65 - 90)  BP: 103/57 (07-15-24 @ 05:56) (100/61 - 103/57)  RR: 18 (07-15-24 @ 05:56) (18 - 18)  SpO2: 95% (07-15-24 @ 05:56) (95% - 99%)  Wt(kg): --Vital Signs Last 24 Hrs  T(C): 36.8 (15 Jul 2024 05:56), Max: 36.8 (15 Jul 2024 05:56)  T(F): 98.2 (15 Jul 2024 05:56), Max: 98.2 (15 Jul 2024 05:56)  HR: 90 (15 Jul 2024 05:56) (65 - 90)  BP: 103/57 (15 Jul 2024 05:56) (100/61 - 103/57)  BP(mean): --  RR: 18 (15 Jul 2024 05:56) (18 - 18)  SpO2: 95% (15 Jul 2024 05:56) (95% - 99%)    Parameters below as of 14 Jul 2024 18:15  Patient On (Oxygen Delivery Method): room air        PHYSICAL EXAM:  Constitutional: resting comfortably in bed; NAD  Head: NC/AT  Extremities: no gross deformities lesions, or edema  Dermatologic: skin warm, dry and intact; no rashes, wounds, or scars  Neurologic: AAOx3    LABS:                CAPILLARY BLOOD GLUCOSE                MEDICATIONS  (STANDING):  enoxaparin Injectable 40 milliGRAM(s) SubCutaneous every 24 hours  nicotine -   7 mG/24Hr(s) Patch 1 Patch Transdermal daily  pantoprazole    Tablet 40 milliGRAM(s) Oral before breakfast  vancomycin  IVPB 1250 milliGRAM(s) IV Intermittent every 8 hours    MEDICATIONS  (PRN):  acetaminophen     Tablet .. 650 milliGRAM(s) Oral every 6 hours PRN Mild Pain (1 - 3)  aluminum hydroxide/magnesium hydroxide/simethicone Suspension 30 milliLiter(s) Oral every 4 hours PRN Dyspepsia  ibuprofen  Tablet. 400 milliGRAM(s) Oral every 6 hours PRN Moderate Pain (4 - 6)  OLANZapine Injectable 5 milliGRAM(s) IntraMuscular every 8 hours PRN agitation, aggresion without redirectability      RADIOLOGY & ADDITIONAL TESTS: Reviewed

## 2024-07-15 NOTE — PROGRESS NOTE ADULT - PROBLEM SELECTOR PLAN 2
spoke with pt called in eliana prep to pt [facility] pharmacy or generic equiv   Pt. reports smoking crack cocaine and meth; he denies IVDA; Utox +cocaine, THC  -- monitor for withdrawal  -- SBIRT social work eval

## 2024-07-15 NOTE — PROGRESS NOTE ADULT - PROBLEM SELECTOR PLAN 1
sepsis POA (WBC, HR); BCx 6/20 growing MRSA in 2/2 bottles; source likely skin (wound above gluteal cleft, drainage cx growing MRSA; folliculitis of extremities); no e/o abscess on CT L-spine; HIV negative; WBC normalized on vanc; surveillance BCx 6/21 NGTD; no e/o IE on TTE, pretest probability not sufficiently high to warrant AUBREY  -- cont. vanc 1250mg q8h, dose per level; goal trough 13-18; plan for 4-week course (through 7/19), per ID recs  -- supportive care  -- contact precautions  -- f/u surveillance cultures  -- Vanc trough 14.5 on 7/3 within therapeutic range. continue current dose of Vanc.  -- Vanc trough 7/7 14.1.  -- Labs WNL    -- pending vanc trough and CBC BMP today sepsis POA (WBC, HR); BCx 6/20 growing MRSA in 2/2 bottles; source likely skin (wound above gluteal cleft, drainage cx growing MRSA; folliculitis of extremities); no e/o abscess on CT L-spine; HIV negative; WBC normalized on vanc; surveillance BCx 6/21 NGTD; no e/o IE on TTE, pretest probability not sufficiently high to warrant AUBREY  -- cont. vanc 1250mg q8h, dose per level; goal trough 13-18; plan for 4-week course (through 7/19), per ID recs  -- supportive care  -- contact precautions  -- f/u surveillance cultures  -- Vanc trough 14.5 on 7/3 within therapeutic range. continue current dose of Vanc.  -- Vanc trough 7/7 14.1.  -- Labs WNL    - was suppose to get vanc trough today, but rejected blood draws as explained in detail in subjective history above. sepsis POA (WBC, HR); BCx 6/20 growing MRSA in 2/2 bottles; source likely skin (wound above gluteal cleft, drainage cx growing MRSA; folliculitis of extremities); no e/o abscess on CT L-spine; HIV negative; WBC normalized on vanc; surveillance BCx 6/21 NGTD; no e/o IE on TTE, pretest probability not sufficiently high to warrant AUBREY  -- cont. vanc 1250mg q8h, dose per level; goal trough 13-18; plan for 4-week course (through 7/19), per ID recs  -- supportive care  -- contact precautions  -- f/u surveillance cultures  -- Vanc trough 14.5 on 7/3 within therapeutic range. continue current dose of Vanc.  -- Vanc trough 7/7 14.1.  -- Labs WNL    - was suppose to get vanc trough today, but rejected blood draws as explained in detail in subjective history above

## 2024-07-15 NOTE — PROGRESS NOTE ADULT - ASSESSMENT
23 year old man with hx of Drug abuse ( crack cocaine and meth ), admitted to the hospital with lower back cellulitis and MRSA bacteremia     Impression and Plan  # MRSA Bacteremia   - source - back cellulitis   - Blood cx negative since 6/21, TTE without evidence of Endocarditis and given easily identifiable source, quick resolution of bacteremia and no other potential hardware for seeding he had a low pre test probability for Endocarditis and AUBREY is not warranted at this time   - Per ID 4 weeks of IV vancomycin , End Date 7/19/24   - needs vanc trough on 7/14, order CBC and CMP alongside trough    # Polysubstance abuse   - not in withdrawal     # GERD   - Continue PPI     # Hypokalemia   # Hypophosphatemia   - resolved after treatment     # Lovenox for DVT prophylaxis      23 year old man with hx of Drug abuse ( crack cocaine and meth ), admitted to the hospital with lower back cellulitis and MRSA bacteremia     Impression and Plan  # MRSA Bacteremia   - source - back cellulitis   - Blood cx negative since 6/21, TTE without evidence of Endocarditis and given easily identifiable source, quick resolution of bacteremia and no other potential hardware for seeding he had a low pre test probability for Endocarditis and AUBREY is not warranted at this time   - Per ID 4 weeks of IV vancomycin , End Date 7/19/24   - was suppose to get vanc trough today, but rejected blood draws as explained in detail in subjective history above.     # Polysubstance abuse   - not in withdrawal     # GERD   - Continue PPI     # Hypokalemia   # Hypophosphatemia   - resolved after treatment     # Lovenox for DVT prophylaxis      23 year old man with hx of Drug abuse ( crack cocaine and meth ), admitted to the hospital with lower back cellulitis and MRSA bacteremia     Impression and Plan  # MRSA Bacteremia   - source - back cellulitis   - Blood cx negative since 6/21, TTE without evidence of Endocarditis and given easily identifiable source, quick resolution of bacteremia and no other potential hardware for seeding he had a low pre test probability for Endocarditis and AUBREY is not warranted at this time   - Per ID 4 weeks of IV vancomycin , End Date 7/19/24   - was suppose to get vanc trough today, but rejected blood draws as explained in detail in subjective history above.   - continue with PRN Zyprexa as needed for agitation, aggression    # Polysubstance abuse   - not in withdrawal     # GERD   - Continue PPI     # Hypokalemia   # Hypophosphatemia   - resolved after treatment     # Lovenox for DVT prophylaxis

## 2024-07-16 LAB
ADD ON TEST-SPECIMEN IN LAB: SIGNIFICANT CHANGE UP
MAGNESIUM SERPL-MCNC: 2.1 MG/DL — SIGNIFICANT CHANGE UP (ref 1.6–2.6)
PHOSPHATE SERPL-MCNC: 5.1 MG/DL — HIGH (ref 2.5–4.5)

## 2024-07-16 PROCEDURE — 99232 SBSQ HOSP IP/OBS MODERATE 35: CPT | Mod: GC

## 2024-07-16 RX ADMIN — Medication 650 MILLIGRAM(S): at 12:08

## 2024-07-16 RX ADMIN — VANCOMYCIN HYDROCHLORIDE 166.67 MILLIGRAM(S): KIT at 16:18

## 2024-07-16 RX ADMIN — Medication 400 MILLIGRAM(S): at 06:15

## 2024-07-16 RX ADMIN — ENOXAPARIN SODIUM 40 MILLIGRAM(S): 100 INJECTION SUBCUTANEOUS at 16:18

## 2024-07-16 RX ADMIN — VANCOMYCIN HYDROCHLORIDE 166.67 MILLIGRAM(S): KIT at 23:17

## 2024-07-16 RX ADMIN — NICOTINE POLACRILEX 1 PATCH: 2 LOZENGE ORAL at 11:21

## 2024-07-16 RX ADMIN — Medication 30 MILLILITER(S): at 11:22

## 2024-07-16 RX ADMIN — VANCOMYCIN HYDROCHLORIDE 166.67 MILLIGRAM(S): KIT at 06:40

## 2024-07-16 RX ADMIN — NICOTINE POLACRILEX 1 PATCH: 2 LOZENGE ORAL at 07:50

## 2024-07-16 RX ADMIN — Medication 30 MILLILITER(S): at 23:17

## 2024-07-16 RX ADMIN — PANTOPRAZOLE SODIUM 40 MILLIGRAM(S): 40 INJECTION, POWDER, FOR SOLUTION INTRAVENOUS at 06:40

## 2024-07-16 NOTE — ADVANCED PRACTICE NURSE CONSULT - ASSESSMENT
Pt sleepy, answers Question appropriately. Laying prone to prevent pressure to area.   On IV abx.     Lower back: full thickness wound, small serosang drainage on bedding. Wound 3x3cm, less than 0.5cm depth. 80% clean red, 20% yellow slough. Tender with cleansing. Periwound with fading erythema No induration or fluctuance.
Pt reports wound feeling much better, minor pain with cleansing.      Lower back: wound continues to improve. 2x2cm area with scattered wounds within this area; wounds 100% healthy red granulation tissue, no depth. Scant serous drainage. No induration or fluctuance 
Pt reports wound feeling much better, minor pain with cleansing.      Lower back: wound significantly improved. 3x3cm area with scattered wounds within this area; wounds 100% healthy red granulation tissue, no depth. Scant serous drainage. No induration or fluctuance

## 2024-07-16 NOTE — PROGRESS NOTE ADULT - SUBJECTIVE AND OBJECTIVE BOX
Patient is a 23y old Male who presents with a chief complaint of back abscess (15 Jul 2024 08:39)      HOSPITAL COURSE: Patient's vanc trough on 7/1 was supratherapeutic and he was switched to vancomycin 1250 mg IV Q8 per pharmacy recommendations. Patient vanc trough is 14.5 on 7/3 within therapeutic rage. Patient will remain on current regimen until 7/19 with plans of disposition to a shelter. Vanc trough 7/7, 14.1 within therapeutic range, will continue with current dose of vanc. Patient reported having abdominal pain which started after dinner, was given Maalox. 7/12 Patient continuing vancomycin treatment, no acute medical events, although he was acting somewhat verbally aggressive in the morning and wanted to be left alone. 7/14 in the afternoon, patient was acting aggressive again today, code grey called, LENNIE outside his room. ordered Zyprexa 5 mg PRN every 8 hours for inability to redirect and aggression and agitation. patient was given Zyprexa 5 mg IM at 15:00 and was very amenable to receiving the dose of medication; security was there as back up for the entire situation but he was totally fine getting the injection. 7/15 vanc trough returned at therapeutic.    OVERNIGHT EVENTS: no acute events overnight    SUBJECTIVE: patient seen this morning at bedside, patient laying in bed awake and alert. he had no subjective complaints. patient was not amenable to a full physical exam, but allowed me to exam his back    ROS: otherwise negative      T(C): 36.6 (07-16-24 @ 05:33), Max: 36.7 (07-15-24 @ 15:17)  HR: 81 (07-16-24 @ 05:33) (61 - 81)  BP: 93/59 (07-16-24 @ 05:33) (93/59 - 115/66)  RR: 18 (07-16-24 @ 05:33) (15 - 18)  SpO2: 96% (07-16-24 @ 05:33) (96% - 98%)  Wt(kg): --Vital Signs Last 24 Hrs  T(C): 36.6 (16 Jul 2024 05:33), Max: 36.7 (15 Jul 2024 15:17)  T(F): 97.9 (16 Jul 2024 05:33), Max: 98 (15 Jul 2024 15:17)  HR: 81 (16 Jul 2024 05:33) (61 - 81)  BP: 93/59 (16 Jul 2024 05:33) (93/59 - 115/66)  BP(mean): 83 (15 Jul 2024 15:17) (83 - 83)  RR: 18 (16 Jul 2024 05:33) (15 - 18)  SpO2: 96% (16 Jul 2024 05:33) (96% - 98%)        PHYSICAL EXAM:  Constitutional: resting comfortably in bed; NAD  Head: NC/AT  Back: back abscess covered in bandage with no apparent erythema, swelling, or redness. bandage clean and dry  Extremities: limbs grossly intact and no apparent swelling or edema  Neurologic: AAOx3  Psychiatric: affect and characteristics of appearance, verbalizations, behaviors are appropriate    LABS:                        14.4   5.54  )-----------( 215      ( 15 Jul 2024 12:07 )             43.5     07-15    141  |  103  |  17  ----------------------------<  182<H>  3.7   |  25  |  0.96    Ca    9.1      15 Jul 2024 12:07          Urinalysis Basic - ( 15 Jul 2024 12:07 )    Color: x / Appearance: x / SG: x / pH: x  Gluc: 182 mg/dL / Ketone: x  / Bili: x / Urobili: x   Blood: x / Protein: x / Nitrite: x   Leuk Esterase: x / RBC: x / WBC x   Sq Epi: x / Non Sq Epi: x / Bacteria: x      CAPILLARY BLOOD GLUCOSE            Urinalysis Basic - ( 15 Jul 2024 12:07 )    Color: x / Appearance: x / SG: x / pH: x  Gluc: 182 mg/dL / Ketone: x  / Bili: x / Urobili: x   Blood: x / Protein: x / Nitrite: x   Leuk Esterase: x / RBC: x / WBC x   Sq Epi: x / Non Sq Epi: x / Bacteria: x        MEDICATIONS  (STANDING):  enoxaparin Injectable 40 milliGRAM(s) SubCutaneous every 24 hours  nicotine -   7 mG/24Hr(s) Patch 1 Patch Transdermal daily  pantoprazole    Tablet 40 milliGRAM(s) Oral before breakfast  vancomycin  IVPB 1250 milliGRAM(s) IV Intermittent every 8 hours    MEDICATIONS  (PRN):  acetaminophen     Tablet .. 650 milliGRAM(s) Oral every 6 hours PRN Mild Pain (1 - 3)  aluminum hydroxide/magnesium hydroxide/simethicone Suspension 30 milliLiter(s) Oral every 4 hours PRN Dyspepsia  ibuprofen  Tablet. 400 milliGRAM(s) Oral every 6 hours PRN Moderate Pain (4 - 6)  OLANZapine Injectable 5 milliGRAM(s) IntraMuscular every 8 hours PRN agitation, aggresion without redirectability      RADIOLOGY & ADDITIONAL TESTS: Reviewed   Patient is a 23y old Male who presents with a chief complaint of back abscess (15 Jul 2024 08:39)      HOSPITAL COURSE: Patient's vanc trough on 7/1 was supratherapeutic and he was switched to vancomycin 1250 mg IV Q8 per pharmacy recommendations. Patient vanc trough is 14.5 on 7/3 within therapeutic rage. Patient will remain on current regimen until 7/19 with plans of disposition to a shelter. Vanc trough 7/7, 14.1 within therapeutic range, will continue with current dose of vanc. Patient reported having abdominal pain which started after dinner, was given Maalox. 7/12 Patient continuing vancomycin treatment, no acute medical events, although he was acting somewhat verbally aggressive in the morning and wanted to be left alone. 7/14 in the afternoon, patient was acting aggressive again today, code grey called, LENNIE outside his room. ordered Zyprexa 5 mg PRN every 8 hours for inability to redirect and aggression and agitation. patient was given Zyprexa 5 mg IM at 15:00 and was very amenable to receiving the dose of medication; security was there as back up for the entire situation but he was totally fine getting the injection. 7/15 vanc trough returned at therapeutic.    OVERNIGHT EVENTS: no acute events overnight    SUBJECTIVE: patient seen this morning at bedside, patient laying in bed awake and alert. he had no subjective complaints. patient was not amenable to a full physical exam, but allowed me to exam his back    ROS: otherwise negative      T(C): 36.6 (07-16-24 @ 05:33), Max: 36.7 (07-15-24 @ 15:17)  HR: 81 (07-16-24 @ 05:33) (61 - 81)  BP: 93/59 (07-16-24 @ 05:33) (93/59 - 115/66)  RR: 18 (07-16-24 @ 05:33) (15 - 18)  SpO2: 96% (07-16-24 @ 05:33) (96% - 98%)  Wt(kg): --Vital Signs Last 24 Hrs  T(C): 36.6 (16 Jul 2024 05:33), Max: 36.7 (15 Jul 2024 15:17)  T(F): 97.9 (16 Jul 2024 05:33), Max: 98 (15 Jul 2024 15:17)  HR: 81 (16 Jul 2024 05:33) (61 - 81)  BP: 93/59 (16 Jul 2024 05:33) (93/59 - 115/66)  BP(mean): 83 (15 Jul 2024 15:17) (83 - 83)  RR: 18 (16 Jul 2024 05:33) (15 - 18)  SpO2: 96% (16 Jul 2024 05:33) (96% - 98%)        PHYSICAL EXAM:  Constitutional: resting comfortably in bed; NAD  Head: NC/AT  Back: back cellulitis covered in bandage with no apparent erythema, swelling, or redness. bandage clean and dry  Extremities: limbs grossly intact and no apparent swelling or edema  Neurologic: AAOx3  Psychiatric: affect and characteristics of appearance, verbalizations, behaviors are appropriate    LABS:                        14.4   5.54  )-----------( 215      ( 15 Jul 2024 12:07 )             43.5     07-15    141  |  103  |  17  ----------------------------<  182<H>  3.7   |  25  |  0.96    Ca    9.1      15 Jul 2024 12:07          Urinalysis Basic - ( 15 Jul 2024 12:07 )    Color: x / Appearance: x / SG: x / pH: x  Gluc: 182 mg/dL / Ketone: x  / Bili: x / Urobili: x   Blood: x / Protein: x / Nitrite: x   Leuk Esterase: x / RBC: x / WBC x   Sq Epi: x / Non Sq Epi: x / Bacteria: x      CAPILLARY BLOOD GLUCOSE            Urinalysis Basic - ( 15 Jul 2024 12:07 )    Color: x / Appearance: x / SG: x / pH: x  Gluc: 182 mg/dL / Ketone: x  / Bili: x / Urobili: x   Blood: x / Protein: x / Nitrite: x   Leuk Esterase: x / RBC: x / WBC x   Sq Epi: x / Non Sq Epi: x / Bacteria: x        MEDICATIONS  (STANDING):  enoxaparin Injectable 40 milliGRAM(s) SubCutaneous every 24 hours  nicotine -   7 mG/24Hr(s) Patch 1 Patch Transdermal daily  pantoprazole    Tablet 40 milliGRAM(s) Oral before breakfast  vancomycin  IVPB 1250 milliGRAM(s) IV Intermittent every 8 hours    MEDICATIONS  (PRN):  acetaminophen     Tablet .. 650 milliGRAM(s) Oral every 6 hours PRN Mild Pain (1 - 3)  aluminum hydroxide/magnesium hydroxide/simethicone Suspension 30 milliLiter(s) Oral every 4 hours PRN Dyspepsia  ibuprofen  Tablet. 400 milliGRAM(s) Oral every 6 hours PRN Moderate Pain (4 - 6)  OLANZapine Injectable 5 milliGRAM(s) IntraMuscular every 8 hours PRN agitation, aggresion without redirectability      RADIOLOGY & ADDITIONAL TESTS: Reviewed

## 2024-07-16 NOTE — PROGRESS NOTE ADULT - ASSESSMENT
23 year old man with hx of Drug abuse ( crack cocaine and meth ), admitted to the hospital with lower back cellulitis and MRSA bacteremia     Impression and Plan  # MRSA Bacteremia   - source - back cellulitis   - Blood cx negative since 6/21, TTE without evidence of Endocarditis and given easily identifiable source, quick resolution of bacteremia and no other potential hardware for seeding he had a low pre test probability for Endocarditis and AUBREY is not warranted at this time   - Per ID 4 weeks of IV vancomycin , End Date 7/19/24   - was suppose to get vanc trough today, but rejected blood draws as explained in detail in subjective history above.   - continue with PRN Zyprexa as needed for agitation, aggression    # Polysubstance abuse   - not in withdrawal     # GERD   - Continue PPI     # Hypokalemia   # Hypophosphatemia   - resolved after treatment     # Lovenox for DVT prophylaxis

## 2024-07-16 NOTE — PROGRESS NOTE ADULT - PROBLEM SELECTOR PLAN 1
sepsis POA (WBC, HR); BCx 6/20 growing MRSA in 2/2 bottles; source likely skin (wound above gluteal cleft, drainage cx growing MRSA; folliculitis of extremities); no e/o abscess on CT L-spine; HIV negative; WBC normalized on vanc; surveillance BCx 6/21 NGTD; no e/o IE on TTE, pretest probability not sufficiently high to warrant AUBREY  -- cont. vanc 1250mg q8h, dose per level; goal trough 13-18; plan for 4-week course (through 7/19), per ID recs  -- supportive care  -- contact precautions  -- f/u surveillance cultures  -- Vanc trough 14.5 on 7/3 within therapeutic range. continue current dose of Vanc.  -- Vanc trough 7/7 14.1.  -- Labs WNL    - vanc trough came back yesterday 7/15/2024 and is therapeutic sepsis POA (WBC, HR); BCx 6/20 growing MRSA in 2/2 bottles; source likely skin (wound above gluteal cleft, drainage cx growing MRSA; folliculitis of extremities); no e/o abscess on CT L-spine; HIV negative; WBC normalized on vanc; surveillance BCx 6/21 NGTD; no e/o IE on TTE, pretest probability not sufficiently high to warrant AUBREY  -- cont. vanc 1250mg q8h, dose per level; goal trough 13-18; plan for 4-week course (through 7/19), per ID recs  -- supportive care  -- contact precautions  -- f/u surveillance cultures  -- Vanc trough 14.5 on 7/3 within therapeutic range. continue current dose of Vanc.  -- Vanc trough 7/7 14.1.  -- Labs WNL    - vanc trough came back yesterday 7/15/2024 and is therapeutic  - update > patient had MRSA bacteremia iso back cellulitis sepsis POA (WBC, HR); BCx 6/20 growing MRSA in 2/2 bottles; source likely skin (wound above gluteal cleft, drainage cx growing MRSA; folliculitis of extremities); no e/o abscess on CT L-spine; HIV negative; WBC normalized on vanc; surveillance BCx 6/21 NGTD; no e/o IE on TTE, pretest probability not sufficiently high to warrant AUBREY  -- cont. vanc 1250mg q8h, dose per level; goal trough 13-18; plan for 4-week course (through 7/19), per ID recs  -- supportive care  -- contact precautions  -- f/u surveillance cultures  -- Vanc trough 14.5 on 7/3 within therapeutic range. continue current dose of Vanc.  -- Vanc trough 7/7 14.1.  -- Labs WNL    - vanc trough came back yesterday 7/15/2024 and is therapeutic  - update > patient had MRSA bacteremia iso back cellulitis, abscess was r/o per imaging

## 2024-07-16 NOTE — ADVANCED PRACTICE NURSE CONSULT - RECOMMEDATIONS
Lower back: triad, foam every other day until healed.     Patient educated on wound care for discharge. He feels he will be able to complete this. Supplies for discharge at bedside.     D/C wound instructions:   Allow wounds to get clean in shower with soap and water.   After cleansing, apply small amt of triad to back of foam dressing and then apply to wound  Change every 2-3 days or prn showering.     Discussed with primary RN and medical team.     Please reconsult if wound deteriorates or new concerns arise.    Total time spent: 30 minutes Lower back: triad, foam every other day until healed.     Patient educated on wound care for discharge. He feels he will be able to complete this. Supplies for discharge at bedside.     D/C wound instructions:   Allow wounds to get clean in shower with soap and water.   After cleansing, apply small amt of triad to back of foam dressing and then apply to wound  Change every 2-3 days or prn showering.     Discussed with patient  and medical team.     Please reconsult if wound deteriorates or new concerns arise.    Total time spent: 30 minutes

## 2024-07-17 PROCEDURE — 99232 SBSQ HOSP IP/OBS MODERATE 35: CPT | Mod: GC

## 2024-07-17 RX ADMIN — VANCOMYCIN HYDROCHLORIDE 166.67 MILLIGRAM(S): KIT at 06:53

## 2024-07-17 RX ADMIN — VANCOMYCIN HYDROCHLORIDE 166.67 MILLIGRAM(S): KIT at 13:45

## 2024-07-17 RX ADMIN — Medication 400 MILLIGRAM(S): at 07:59

## 2024-07-17 RX ADMIN — Medication 400 MILLIGRAM(S): at 15:44

## 2024-07-17 RX ADMIN — NICOTINE POLACRILEX 1 PATCH: 2 LOZENGE ORAL at 13:45

## 2024-07-17 RX ADMIN — Medication 400 MILLIGRAM(S): at 00:16

## 2024-07-17 RX ADMIN — VANCOMYCIN HYDROCHLORIDE 166.67 MILLIGRAM(S): KIT at 22:12

## 2024-07-17 RX ADMIN — PANTOPRAZOLE SODIUM 40 MILLIGRAM(S): 40 INJECTION, POWDER, FOR SOLUTION INTRAVENOUS at 06:53

## 2024-07-17 RX ADMIN — NICOTINE POLACRILEX 1 PATCH: 2 LOZENGE ORAL at 18:42

## 2024-07-17 RX ADMIN — ENOXAPARIN SODIUM 40 MILLIGRAM(S): 100 INJECTION SUBCUTANEOUS at 13:46

## 2024-07-17 RX ADMIN — Medication 400 MILLIGRAM(S): at 22:12

## 2024-07-17 RX ADMIN — Medication 30 MILLILITER(S): at 06:53

## 2024-07-17 NOTE — PROGRESS NOTE ADULT - PROBLEM SELECTOR PROBLEM 3
Acid reflux
Acid reflux
Polysubstance abuse
Acid reflux
Acid reflux
Polysubstance abuse
Acid reflux
Polysubstance abuse
Polysubstance abuse
Acid reflux
Acid reflux
Polysubstance abuse
Acid reflux
Acid reflux
Polysubstance abuse
Acid reflux
Acid reflux
Polysubstance abuse
Wound cellulitis
Acid reflux
Polysubstance abuse
Acid reflux
Polysubstance abuse
Acid reflux
Acid reflux
Polysubstance abuse
Acid reflux
Polysubstance abuse

## 2024-07-17 NOTE — PROGRESS NOTE ADULT - PROBLEM SELECTOR PLAN 1
sepsis POA (WBC, HR); BCx 6/20 growing MRSA in 2/2 bottles; source likely skin (wound above gluteal cleft, drainage cx growing MRSA; folliculitis of extremities); no e/o abscess on CT L-spine; HIV negative; WBC normalized on vanc; surveillance BCx 6/21 NGTD; no e/o IE on TTE, pretest probability not sufficiently high to warrant AUBREY  -- cont. vanc 1250mg q8h, dose per level; goal trough 13-18; plan for 4-week course (through 7/19), per ID recs  -- supportive care  -- contact precautions  -- f/u surveillance cultures  -- Vanc trough 14.5 on 7/3 within therapeutic range. continue current dose of Vanc.  -- Vanc trough 7/7 14.1.  -- Labs WNL    - vanc trough came back yesterday 7/15/2024 and is therapeutic

## 2024-07-17 NOTE — PROGRESS NOTE ADULT - PROBLEM SELECTOR PLAN 6
social work consult
repleted, was then slightly elevated, now normalized  -- monitor phos
social work consult
F: s/p 2.5L NS   E: replete PRN K>4, Mg> 2  N: regular diet  GI: none:   DVT: Lovenox   Code: Full  Dispo: [facility]
social work consult

## 2024-07-17 NOTE — PROGRESS NOTE ADULT - PROBLEM SELECTOR PLAN 4
repleted
RESOLVED
RESOLVED
repleted
repleted
RESOLVED
RESOLVED
repleted
repleted
RESOLVED
repleted
repleted
RESOLVED
RESOLVED
repleted
repleted
RESOLVED
Patient with multiple maculopapular skin lesions concerning for possible money pox.    - Follow up money pox CPR
RESOLVED
repleted
RESOLVED
repleted
RESOLVED
chronic  -- cont. PPI

## 2024-07-17 NOTE — PROGRESS NOTE ADULT - PROBLEM SELECTOR PROBLEM 1
MRSA bacteremia
Bacteremia
MRSA bacteremia

## 2024-07-17 NOTE — PROGRESS NOTE ADULT - PROBLEM SELECTOR PROBLEM 5
Hypophosphatemia
Hypophosphatemia
Hypokalemia
Hypophosphatemia
Moderate substance use disorder
Hypophosphatemia
I will START or STAY ON the medications listed below when I get home from the hospital:    Tri-Vi-Sol oral liquid  -- 1 milliliter(s) by mouth once a day  -- Indication: For nutritional supplementaiton

## 2024-07-17 NOTE — PROGRESS NOTE ADULT - SUBJECTIVE AND OBJECTIVE BOX
Patient is a 23y old  Male who presents with a chief complaint of back cellulitis (16 Jul 2024 10:42)      HOSPITAL COURSE: Patient's vanc trough on 7/1 was supratherapeutic and he was switched to vancomycin 1250 mg IV Q8 per pharmacy recommendations. Patient vanc trough is 14.5 on 7/3 within therapeutic rage. Patient will remain on current regimen until 7/19 with plans of disposition to a shelter. Vanc trough 7/7, 14.1 within therapeutic range, will continue with current dose of vanc. Patient reported having abdominal pain which started after dinner, was given Maalox. 7/12 Patient continuing vancomycin treatment, no acute medical events, although he was acting somewhat verbally aggressive in the morning and wanted to be left alone. 7/14 in the afternoon, patient was acting aggressive again today, code grey called, LENNIE outside his room. ordered Zyprexa 5 mg PRN every 8 hours for inability to redirect and aggression and agitation. patient was given Zyprexa 5 mg IM at 15:00 and was very amenable to receiving the dose of medication; security was there as back up for the entire situation but he was totally fine getting the injection. 7/15 vanc trough returned at therapeutic.       OVERNIGHT EVENTS: mary o/n    SUBJECTIVE: seen at bedside this AM, no subjective complaints    ROS: otherwise negative      T(C): 36.4 (07-17-24 @ 10:51), Max: 36.4 (07-17-24 @ 10:51)  HR: 67 (07-17-24 @ 10:51) (67 - 67)  BP: 106/52 (07-17-24 @ 10:51) (106/52 - 106/52)  RR: 18 (07-17-24 @ 10:51) (18 - 18)  SpO2: 96% (07-17-24 @ 10:51) (96% - 96%)  Wt(kg): --Vital Signs Last 24 Hrs  T(C): 36.4 (17 Jul 2024 10:51), Max: 36.4 (17 Jul 2024 10:51)  T(F): 97.6 (17 Jul 2024 10:51), Max: 97.6 (17 Jul 2024 10:51)  HR: 67 (17 Jul 2024 10:51) (67 - 67)  BP: 106/52 (17 Jul 2024 10:51) (106/52 - 106/52)  BP(mean): --  RR: 18 (17 Jul 2024 10:51) (18 - 18)  SpO2: 96% (17 Jul 2024 10:51) (96% - 96%)    Parameters below as of 17 Jul 2024 10:51  Patient On (Oxygen Delivery Method): room air        PHYSICAL EXAM:  Constitutional: resting comfortably in bed; NAD  Head: NC/AT  Extremities: limbs grossly intact and no apparent swelling or edema  Neurologic: AAOx3  Psychiatric: affect and characteristics of appearance, verbalizations, behaviors are appropriate    LABS:                CAPILLARY BLOOD GLUCOSE                MEDICATIONS  (STANDING):  enoxaparin Injectable 40 milliGRAM(s) SubCutaneous every 24 hours  nicotine -   7 mG/24Hr(s) Patch 1 Patch Transdermal daily  pantoprazole    Tablet 40 milliGRAM(s) Oral before breakfast  vancomycin  IVPB 1250 milliGRAM(s) IV Intermittent every 8 hours    MEDICATIONS  (PRN):  acetaminophen     Tablet .. 650 milliGRAM(s) Oral every 6 hours PRN Mild Pain (1 - 3)  aluminum hydroxide/magnesium hydroxide/simethicone Suspension 30 milliLiter(s) Oral every 4 hours PRN Dyspepsia  ibuprofen  Tablet. 400 milliGRAM(s) Oral every 6 hours PRN Moderate Pain (4 - 6)  OLANZapine Injectable 5 milliGRAM(s) IntraMuscular every 8 hours PRN agitation, aggresion without redirectability      RADIOLOGY & ADDITIONAL TESTS: Reviewed

## 2024-07-17 NOTE — PROGRESS NOTE ADULT - PROBLEM SELECTOR PROBLEM 6
Homelessness
Preventive measure
Homelessness
Hypophosphatemia
Homelessness

## 2024-07-17 NOTE — PROGRESS NOTE ADULT - PROBLEM SELECTOR PROBLEM 4
Hypokalemia
Suspected monkeypox
Hypokalemia
Acid reflux
Hypokalemia

## 2024-07-17 NOTE — PROGRESS NOTE ADULT - PROBLEM SELECTOR PROBLEM 2
Polysubstance abuse
Suspected monkeypox
Suspected monkeypox
Polysubstance abuse
Suspected monkeypox
Suspected monkeypox
Polysubstance abuse
Polysubstance abuse
Sepsis
Suspected monkeypox
Polysubstance abuse
Polysubstance abuse
Suspected monkeypox
Polysubstance abuse
Suspected monkeypox
Suspected monkeypox
Polysubstance abuse
Suspected monkeypox
Polysubstance abuse
Polysubstance abuse
Suspected monkeypox
Polysubstance abuse
Suspected monkeypox

## 2024-07-17 NOTE — CHART NOTE - NSCHARTNOTEFT_GEN_A_CORE
ADMITTING DIAGNOSIS:   Patient is a 23y old  Male who presents with a chief complaint of Lower back abscess (08 Jul 2024 06:47)    CURRENT DIET ORDER: Diet, Regular (06-21-24 @ 01:58) [Active]    PO INTAKE: Good (%) [X]  Fair (50-75%) [   ] Poor (<25%) [   ]    GI ISSUES: pt reported occasional nausea, emesis last night (7/7) which has now resolved    PAIN: none reported at this time    SKIN: pt refused skin assessment per RN flowsheets    EDEMA: no edema per RN flowsheets    LABS:   07-07    139  |  100  |  16  ----------------------------<  103<H>  4.2   |  27  |  0.94    Ca    9.6      07 Jul 2024 16:32  Phos  4.2     07-07  Mg     2.0     07-07    TPro  8.0  /  Alb  4.0  /  TBili  0.3  /  DBili  x   /  AST  22  /  ALT  35  /  AlkPhos  81  07-07    MEDICATIONS:  MEDICATIONS  (STANDING):  enoxaparin Injectable 40 milliGRAM(s) SubCutaneous every 24 hours  nicotine -   7 mG/24Hr(s) Patch 1 Patch Transdermal daily  pantoprazole    Tablet 40 milliGRAM(s) Oral before breakfast  vancomycin  IVPB 1250 milliGRAM(s) IV Intermittent every 8 hours    MEDICATIONS  (PRN):  acetaminophen     Tablet .. 650 milliGRAM(s) Oral every 6 hours PRN Mild Pain (1 - 3)  ibuprofen  Tablet. 400 milliGRAM(s) Oral every 6 hours PRN Moderate Pain (4 - 6)    WEIGHT: (6/20)  Height for BMI (FEET)	5 Feet  Height for BMI (INCHES)	6 Inch(s)  Height for BMI (CENTIMETERS)	167.64 Centimeter(s)  Weight for BMI (lbs)	175 lb  Weight for BMI (kg)	79.4 kg  Body Mass Index	28.2    WEIGHT CHANGE: no updated weight since admission    NUTRITION FOCUSED PHYSICAL EXAM: Not Pertinent at this time    ESTIMATED ENERGY NEEDS:   Weight used for calculations	IBW  Estimated Energy Needs Weight (lbs)	142 lb  Estimated Energy Needs Weight (kg)	64.4 kg  Estimated Energy Needs From (satya/kg)	30  Estimated Energy Needs To (satya/kg)	35  Estimated Energy Needs Calculated From (satya/kg)	1932  Estimated Energy Needs Calculated To (satya/kg)	2254    Weight used for calculations	IBW  Estimated Protein Needs Weight (lbs)	142 lb  Estimated Protein Needs Weight (kg)	64.4 kg  Estimated Protein Needs From (g/kg)	1.25  Estimated Protein Needs To (g/kg)	1.5  Estimated Protein Needs Calculated From (g/kg)	80.5  Estimated Protein Needs Calculated To (g/kg)	96.6    Estimated Fluid Needs Weight (lbs)	142 lb  Estimated Fluid Needs Weight (kg)	64.4 kg  Estimated Fluid Needs From (ml/kg)	25  Estimated Fluid Needs To (ml/kg)	30  Estimated Fluid Needs Calculated From (ml/kg)	1610  Estimated Fluid Needs Calculated To (ml/kg)	1932    *Using ideal body weight as pt is >120% ideal body weight. Needs adjusted for wound healing. ideal body weight: 142lbs; % ideal body weight: 123%    SUBJECTIVE:   Per H&P: 23-year-old male with substance use disorder with complaints of painful ulceration with purulent drainage of mid lower back.  He reports the lesion started 4 days ago, was initially small, he picked at it after which it grew in size and started to drain.  He reports fever, chills, no nausea, vomiting. For the last 2 days pain has been too intense that he hasn't been able to lie on his back.  Reports he smokes cocaine and meth but no IV drug use.  He does not have any medical conditions, does not take any medications.  ROS other medications.     (6/24) Met with pt this AM at bedside. Pt was lying down, resting, and able to articulate his nutrition hx well. No known food allergies nor food intolerances reported. Pt reported good appetite PTA, which persists, denied following any specific diet at home. Pt denied chewing/swallowing difficulties. Pt denied nausea/vomiting/diarrhea/constipation, stated last BM this AM 6/24. Pt could not recall usual body weight, however denied any recent wt loss/gain. RD reviewed protein sources (chicken, fish, beans, nut, etc.) and encouraged adequate protein consumption in order to regain strength, maintain muscle mass and reach adequate nutritional status. Pt was accepting to RD suggestion and asking appropriate questions. Pt without overt visual muscle or subcutaneous fat wasting.    (7/1) Met with pt this AM at bedside. Pt was seated upright and able to articulate his nutrition hx well. Pt reported continued good appetite and adequate PO intake, mentioned he's been eating his meals. Pt endorsed previous nausea (denied emesis) which he mentioned has now resolved, denied diarrhea/constipation, stated last BM 6/30. RD reviewed protein sources (chicken, fish, beans, nut, etc.) and encouraged adequate protein consumption in order to regain strength, maintain muscle mass and reach adequate nutritional status. Pt was accepting to RD suggestion and asking appropriate questions.     (7/8) Met with pt this AM at bedside. Pt was seated upright and able to articulate his nutrition hx well. Pt reported good appetite and taking adequate POs - RD observed pt eating 100% lunch. Pt endorsed occasional nausea/vomiting (7/7), which has now resolved, denied diarrhea/constipation, last BM 7/6 per RN flowsheets. RD reviewed protein sources (chicken, fish, beans, nut, etc.) and encouraged continued adequate protein consumption in order to regain strength, maintain muscle mass and reach adequate nutritional status. Pt was accepting to RD suggestion.    PREVIOUS NUTRITION DIAGNOSIS:  Increased Nutrient Needs (protein and micronutrients) related to wound healing/increased physiological demand for nutrients as evidenced by lower back lesion    Active [X]  Resolved [   ]    IF RESOLVED, NEW PES: N/A    GOAL: Pt to meet >/= 75% estimated protein needs; show improvements in skin integrity throughout hospitalization    MONITORING AND EVALUATION:   Current diet order is appropriate and is well tolerated, will continue to monitor:  - Food and nutrient intake/POs  - Nutrition related lab values  - Weight/weight trends  - GI functions    NUTRITION RECOMMENDATIONS:   1. Continue with Regular diet  - Continue to encourage adequate PO and protein intake  - Honor food preferences, as medically able  2. Continue to monitor BMs to ensure consistent BMs every 1-2 days  3. Appreciate updated weight and weekly weight trends    RISK LEVEL: High [   ] Moderate [X] Low [   ]    Tanvi Nelson RDN also available via TEAMS
I was paged by nursing and told that pt will be transferred to 7th floor since he fired his nurse and none of the other nurses want to work with him due to him being aggressive. pt did not want to be transferred. I called security to accompany me to talk to him. it was explained to him by charge nurse that he is being transferred to the 7th floor and that he will be in a room with another pt who has the same infection. Patient refused leaving his room stating that he does not want to be in a room with a patient who might get him sick. patient was quite agitated starting to get verbally aggressive.  was present and explained to him that he needs to be transferred or that he can leave. patient refused leaving his room and said he won't sign ama form to leave. Conversation concluded with  saying that someone from administration will come and talk to him.
ADMITTING DIAGNOSIS:   Patient is a 23y old  Male who presents with a chief complaint of Bacteremia secondary to lower back abscess (01 Jul 2024 08:41)    CURRENT DIET ORDER: Diet, Regular (06-21-24 @ 01:58) [Active]    PO INTAKE: Good (%) [X]  Fair (50-75%) [   ] Poor (<25%) [   ]    GI ISSUES: none reported at this time    PAIN: none reported at this time    SKIN: no pressure injury; lower back abscess per RN flowsheets    06-30-24 @ 07:01  -  07-01-24 @ 07:00  --------------------------------------------------------  IN: 500 mL / OUT: 0 mL / NET: 500 mL    EDEMA: no edema per RN flowsheets    LABS:   07-01    140  |  103  |  20  ----------------------------<  103<H>  4.1   |  26  |  0.84    Ca    9.5      01 Jul 2024 05:30  Phos  5.4     07-01  Mg     2.3     07-01    TPro  7.4  /  Alb  3.8  /  TBili  0.2  /  DBili  x   /  AST  18  /  ALT  37  /  AlkPhos  67  06-30    MEDICATIONS:  MEDICATIONS  (STANDING):  enoxaparin Injectable 40 milliGRAM(s) SubCutaneous every 24 hours  nicotine -   7 mG/24Hr(s) Patch 1 Patch Transdermal daily  pantoprazole    Tablet 40 milliGRAM(s) Oral before breakfast    MEDICATIONS  (PRN):  acetaminophen     Tablet .. 650 milliGRAM(s) Oral every 6 hours PRN Mild Pain (1 - 3)  aluminum hydroxide/magnesium hydroxide/simethicone Suspension 30 milliLiter(s) Oral every 4 hours PRN Dyspepsia  ibuprofen  Tablet. 400 milliGRAM(s) Oral every 6 hours PRN Moderate Pain (4 - 6)  oxyCODONE    IR 2.5 milliGRAM(s) Oral every 4 hours PRN Severe Pain (7 - 10)    WEIGHT: (6/20)  Height for BMI (FEET)	5 Feet  Height for BMI (INCHES)	6 Inch(s)  Height for BMI (CENTIMETERS)	167.64 Centimeter(s)  Weight for BMI (lbs)	175 lb  Weight for BMI (kg)	79.4 kg  Body Mass Index	28.2    WEIGHT CHANGE: no updated weight since admission    NUTRITION FOCUSED PHYSICAL EXAM: Not Pertinent at this time    ESTIMATED ENERGY NEEDS:   Weight used for calculations	IBW  Estimated Energy Needs Weight (lbs)	142 lb  Estimated Energy Needs Weight (kg)	64.4 kg  Estimated Energy Needs From (satya/kg)	30  Estimated Energy Needs To (satya/kg)	35  Estimated Energy Needs Calculated From (satya/kg)	1932  Estimated Energy Needs Calculated To (satya/kg)	2254    Weight used for calculations	IBW  Estimated Protein Needs Weight (lbs)	142 lb  Estimated Protein Needs Weight (kg)	64.4 kg  Estimated Protein Needs From (g/kg)	1.25  Estimated Protein Needs To (g/kg)	1.5  Estimated Protein Needs Calculated From (g/kg)	80.5  Estimated Protein Needs Calculated To (g/kg)	96.6    Estimated Fluid Needs Weight (lbs)	142 lb  Estimated Fluid Needs Weight (kg)	64.4 kg  Estimated Fluid Needs From (ml/kg)	25  Estimated Fluid Needs To (ml/kg)	30  Estimated Fluid Needs Calculated From (ml/kg)	1610  Estimated Fluid Needs Calculated To (ml/kg)	1932    *Using ideal body weight as pt is >120% ideal body weight. Needs adjusted for wound healing. ideal body weight: 142lbs; % ideal body weight: 123%    SUBJECTIVE:   Per H&P: 23-year-old male with substance use disorder with complaints of painful ulceration with purulent drainage of mid lower back.  He reports the lesion started 4 days ago, was initially small, he picked at it after which it grew in size and started to drain.  He reports fever, chills, no nausea, vomiting. For the last 2 days pain has been too intense that he hasn't been able to lie on his back.  Reports he smokes cocaine and meth but no IV drug use.  He does not have any medical conditions, does not take any medications.  ROS other medications.     (6/24) Met with pt this AM at bedside. Pt was lying down, resting, and able to articulate his nutrition hx well. No known food allergies nor food intolerances reported. Pt reported good appetite PTA, which persists, denied following any specific diet at home. Pt denied chewing/swallowing difficulties. Pt denied nausea/vomiting/diarrhea/constipation, stated last BM this AM 6/24. Pt could not recall usual body weight, however denied any recent wt loss/gain. RD reviewed protein sources (chicken, fish, beans, nut, etc.) and encouraged adequate protein consumption in order to regain strength, maintain muscle mass and reach adequate nutritional status. Pt was accepting to RD suggestion and asking appropriate questions. Pt without overt visual muscle or subcutaneous fat wasting.    (7/1) Met with pt this AM at bedside. Pt was seated upright and able to articulate his nutrition hx well. Pt reported continued good appetite and adequate PO intake, mentioned he's been eating his meals. Pt endorsed previous nausea (denied emesis) which he mentioned has now resolved, denied diarrhea/constipation, stated last BM 6/30. RD reviewed protein sources (chicken, fish, beans, nut, etc.) and encouraged adequate protein consumption in order to regain strength, maintain muscle mass and reach adequate nutritional status. Pt was accepting to RD suggestion and asking appropriate questions.     *Note: Phos 5.4 mg/dL (7/1)    PREVIOUS NUTRITION DIAGNOSIS:  Increased Nutrient Needs (protein and micronutrients) related to wound healing/increased physiological demand for nutrients as evidenced by lower back lesion    Active [X]  Resolved [   ]    IF RESOLVED, NEW PES: N/A    GOAL: Pt to meet >/= 75% estimated protein needs; show improvements in skin integrity throughout hospitalization    MONITORING AND EVALUATION:   Current diet order is appropriate and is well tolerated, will continue to monitor:  - Food and nutrient intake/POs  - Nutrition related lab value, specifically Phos  - Weight/weight trends  - GI functions    NUTRITION RECOMMENDATIONS:   1. Continue with Regular diet  - Continue to encourage adequate PO and protein intake  - Honor food preferences, as medically able  2. Continue to monitor BMs to ensure consistent BMs every 1-2 days  3. Appreciate updated weight and weekly weight trends    RISK LEVEL: High [   ] Moderate [X] Low [   ]    Tanvi Nelson RDN also available via TEAMS
ADMITTING DIAGNOSIS:   Patient is a 23y old  Male who presents with a chief complaint of back abscess (15 Jul 2024 08:39)    CURRENT DIET ORDER: Diet, Regular (06-21-24 @ 01:58) [Active]     PO INTAKE: Good (%) [  ]  Fair (50-75%) [X] Poor (<25%) [   ]    GI ISSUES: none reported at this time    PAIN: none reported at this time    SKIN: Lower back abscess (7/6); pt refused skin assessment per RN flowsheets    EDEMA: No edema per RN flowsheets    LABS:   07-15    141  |  103  |  17  ----------------------------<  182<H>  3.7   |  25  |  0.96    Ca    9.1      15 Jul 2024 12:07    MEDICATIONS:  MEDICATIONS  (STANDING):  enoxaparin Injectable 40 milliGRAM(s) SubCutaneous every 24 hours  nicotine -   7 mG/24Hr(s) Patch 1 Patch Transdermal daily  pantoprazole    Tablet 40 milliGRAM(s) Oral before breakfast  vancomycin  IVPB 1250 milliGRAM(s) IV Intermittent every 8 hours    MEDICATIONS  (PRN):  acetaminophen     Tablet .. 650 milliGRAM(s) Oral every 6 hours PRN Mild Pain (1 - 3)  aluminum hydroxide/magnesium hydroxide/simethicone Suspension 30 milliLiter(s) Oral every 4 hours PRN Dyspepsia  ibuprofen  Tablet. 400 milliGRAM(s) Oral every 6 hours PRN Moderate Pain (4 - 6)  OLANZapine Injectable 5 milliGRAM(s) IntraMuscular every 8 hours PRN agitation, aggresion without redirectability    WEIGHT: (6/20)  Height for BMI (FEET)	5 Feet  Height for BMI (INCHES)	6 Inch(s)  Height for BMI (CENTIMETERS)	167.64 Centimeter(s)  Weight for BMI (lbs)	175 lb  Weight for BMI (kg)	79.4 kg  Body Mass Index	28.2    WEIGHT CHANGE: no updated weight since admission    NUTRITION FOCUSED PHYSICAL EXAM: Not Pertinent at this time    ESTIMATED ENERGY NEEDS:   Weight used for calculations	IBW  Estimated Energy Needs Weight (lbs)	142 lb  Estimated Energy Needs Weight (kg)	64.4 kg  Estimated Energy Needs From (satya/kg)	30  Estimated Energy Needs To (satya/kg)	35  Estimated Energy Needs Calculated From (satya/kg)	1932  Estimated Energy Needs Calculated To (satya/kg)	2254    Weight used for calculations	IBW  Estimated Protein Needs Weight (lbs)	142 lb  Estimated Protein Needs Weight (kg)	64.4 kg  Estimated Protein Needs From (g/kg)	1.25  Estimated Protein Needs To (g/kg)	1.5  Estimated Protein Needs Calculated From (g/kg)	80.5  Estimated Protein Needs Calculated To (g/kg)	96.6    Estimated Fluid Needs Weight (lbs)	142 lb  Estimated Fluid Needs Weight (kg)	64.4 kg  Estimated Fluid Needs From (ml/kg)	25  Estimated Fluid Needs To (ml/kg)	30  Estimated Fluid Needs Calculated From (ml/kg)	1610  Estimated Fluid Needs Calculated To (ml/kg)	1932    *Using ideal body weight as pt is >120% ideal body weight. Needs adjusted for wound healing. ideal body weight: 142lbs; % ideal body weight: 123%    SUBJECTIVE:   Per H&P: 23-year-old male with substance use disorder with complaints of painful ulceration with purulent drainage of mid lower back.  He reports the lesion started 4 days ago, was initially small, he picked at it after which it grew in size and started to drain.  He reports fever, chills, no nausea, vomiting. For the last 2 days pain has been too intense that he hasn't been able to lie on his back.  Reports he smokes cocaine and meth but no IV drug use.  He does not have any medical conditions, does not take any medications.  ROS other medications.     (6/24) Met with pt this AM at bedside. Pt was lying down, resting, and able to articulate his nutrition hx well. No known food allergies nor food intolerances reported. Pt reported good appetite PTA, which persists, denied following any specific diet at home. Pt denied chewing/swallowing difficulties. Pt denied nausea/vomiting/diarrhea/constipation, stated last BM this AM 6/24. Pt could not recall usual body weight, however denied any recent wt loss/gain. RD reviewed protein sources (chicken, fish, beans, nut, etc.) and encouraged adequate protein consumption in order to regain strength, maintain muscle mass and reach adequate nutritional status. Pt was accepting to RD suggestion and asking appropriate questions. Pt without overt visual muscle or subcutaneous fat wasting.    (7/1) Met with pt this AM at bedside. Pt was seated upright and able to articulate his nutrition hx well. Pt reported continued good appetite and adequate PO intake, mentioned he's been eating his meals. Pt endorsed previous nausea (denied emesis) which he mentioned has now resolved, denied diarrhea/constipation, stated last BM 6/30. RD reviewed protein sources (chicken, fish, beans, nut, etc.) and encouraged adequate protein consumption in order to regain strength, maintain muscle mass and reach adequate nutritional status. Pt was accepting to RD suggestion and asking appropriate questions.     (7/8) Met with pt this AM at bedside. Pt was seated upright and able to articulate his nutrition hx well. Pt reported good appetite and taking adequate POs - RD observed pt eating 100% lunch. Pt endorsed occasional nausea/vomiting (7/7), which has now resolved, denied diarrhea/constipation, last BM 7/6 per RN flowsheets. RD reviewed protein sources (chicken, fish, beans, nut, etc.) and encouraged continued adequate protein consumption in order to regain strength, maintain muscle mass and reach adequate nutritional status. Pt was accepting to RD suggestion.    (7/15) Met with pt this AM at bedside. Pt answered minimal nutrition questions and refused continuation of nutrition interview. Pt reported good appetite and eating meals. Pt was not responsive to further nutrition questions, however no report of nausea/vomiting/diarrhea/constipation at this time, pt with good POs throughout admission. RD will continue to follow.     PREVIOUS NUTRITION DIAGNOSIS:  Increased Nutrient Needs (protein and micronutrients) related to wound healing/increased physiological demand for nutrients as evidenced by lower back lesion    Active [X]  Resolved [   ]    IF RESOLVED, NEW PES: N/A    GOAL: Pt to meet >/= 75% estimated protein needs; show improvements in skin integrity throughout hospitalization    MONITORING AND EVALUATION:   Current diet order is appropriate and is well tolerated, will continue to monitor:  - Food and nutrient intake/POs  - Nutrition related lab values  - Weight/weight trends  - GI functions    NUTRITION RECOMMENDATIONS:   1. Continue with Regular diet  - Continue to encourage adequate PO and protein intake  - Honor food preferences, as medically able  2. Continue to monitor BMs to ensure consistent BMs every 1-2 days  3. Appreciate updated weight and weekly weight trends    RISK LEVEL: High [   ] Moderate [X] Low [   ]    Tanvi Nelson RDN also available via TEAMS

## 2024-07-17 NOTE — PROGRESS NOTE ADULT - ATTENDING COMMENTS
Agree with above.  Patient with MRSA bacteremia likely secondary to pilonidal abscess.  Denies IVDA.  Recommend increasing Vancomycin to 1250 mg IV q8hrs (given his age and good kidney function). Will need a trough before 4th dose (around 12 noon tomorrow provided no doses missed or delayed).  Check surveillance blood cultures on 6/22/24.
Agree with above. Patient is doing much better with clearance of blood cultures and resolution of leukocytosis.  His abscess is improving.  Recommend continue Vancomycin 1500 mg IV q8hrs x 4 weeks (ends 7/19/2024).  Ok to place PICC once surveillance blood cultures negative x 48hrs.  Needs weekly CBC, CMP, vanco trough.  Can fax to:  [phone].  He can follow up with me in 2-3 weeks    ID team 1 will sign off.  Reconsult as needed
23 YOM with PMH of GERD, PSA (cocaine, THC, meth, no IVDA), homelessness admitted for sepsis 2/2 MRSA bacteremia (source likely skin; wound above gluteal cleft, drainage cx growing MRSA; folliculitis of extremities). Surveillance cultures negative, TTE w/o vegetations, pre-test probability not sufficiently high to warrant AUBREY. Plan for 4-week course of IV vancomycin per ID recommendations (EOT 7/19).     This morning, has some right forearm swelling from infiltrated IV.     Dispo: homeless and uninsured, finish course of IV abx at [facility] with eventual discharge to shelter
22 y/o M w/ substance use disorder who presented with complaints of painful ulceration with purulent drainage of mid lower back, diffuse maculopapular lesions concerning for possible Monkeypox.    Plan  -c/w vancomycin, ID recs appreciated  -f/u vanc trough  -contact precautions  -c/w monitoring for substance withdrawal
23 year old man with hx of Drug abuse ( crack cocaine and meth )  , admitted to the hospital with Lower back cellulitis and MRSA bacteremia     Impression and Plan  # MRSA Bacteremia   - source - back cellulitis   - Blood cx negative since 6/21 , TTE without evidence of Endocarditis and given easily identifiable source , quick resolution of bacteremia and no other potential hardware for seeding he had a low pre test probability for Endocarditis and AUBREY is not warranted at this time   - Per ID 4 weeks of IV vancomycin , End Date 7/19/24     # Polysubstance abuse   - not in withdrawal     # GERD   - Continue PPI     # Hypokalemia   # Hypophosphatemia   - resolved after treatment     # Lovenox for DVT prophylaxis
If patient is aggressive with staff ,  he can be discharged from the hospital , his last day of antibiotics is 7/19, he has been afebrile and does not show signs of active infection at this time.     He has been very disruptive and threatening staff , being aggressive and nearly assaulting staff .    He has been accepted to the [facility] system and if aggressive , security will be called and the patient can be escorted out of the hospital as he can be discharged
Patient without new complaints, afebrile, labs WNL. Follow-up Mag and phos level.  Getting IV ATB until 7/19, shelter packed completed.  Will need follow-up with ID and PCP on discharge.
resting in bed , no new complaints   on exam lungs clear , regular heart sounds , no new murmurs , no abd tenderness, no lower extremity edema ,     23 YOM with PMH of GERD, PSA (cocaine, THC, meth, no IVDA), homelessness admitted for sepsis 2/2 MRSA bacteremia (source likely skin; wound above gluteal cleft, drainage cx growing MRSA; folliculitis of extremities). Surveillance cultures negative, TTE w/o vegetations, pre-test probability not sufficiently high to warrant AUBREY. Plan for 4-week course of IV vancomycin per ID recommendations (EOT 7/19).
23 year old man with hx of Drug abuse ( crack cocaine and meth )  , admitted to the hospital with Lower back cellulitis and MRSA bacteremia     Impression and Plan  # MRSA Bacteremia   - source - back cellulitis   - Blood cx negative since 6/21 , TTE without evidence of Endocarditis and given easily identifiable source , quick resolution of bacteremia and no other potential hardware for seeding he had a low pre test probability for Endocarditis and AUBREY is not warranted at this time   - Per ID 4 weeks of IV vancomycin , End Date 7/19/24     # Polysubstance abuse   - not in withdrawal     # GERD   - Continue PPI     # Hypokalemia   # Hypophosphatemia   - resolved after treatment     # Lovenox for DVT prophylaxis.
23 year old man with hx of Drug abuse ( crack cocaine and meth )  , admitted to the hospital with Lower back cellulitis and MRSA bacteremia     Impression and Plan  # MRSA Bacteremia   - source - back cellulitis   - Blood cx negative since 6/21 , TTE without evidence of Endocarditis and given easily identifiable source , quick resolution of bacteremia and no other potential hardware for seeding he had a low pre test probability for Endocarditis and AUBREY is not warranted at this time   - Per ID 4 weeks of IV vancomycin , End Date 7/19/24     # Polysubstance abuse   - not in withdrawal     # GERD   - Continue PPI     # Hypokalemia   # Hypophosphatemia   - resolved after treatment     # Lovenox for DVT prophylaxis.
23 year old man with hx of Drug abuse ( crack cocaine and meth )  , admitted to the hospital with Lower back cellulitis and MRSA bacteremia     7/11   -angry , agitated and did not want to talk to us or let us examine him today   - did not even give us an answer about why he was angry ,  when asked what we can do to help him he said " just leave me alone and don't come in to my room "     Impression and Plan  # MRSA Bacteremia   - source - back cellulitis   - Blood cx negative since 6/21 , TTE without evidence of Endocarditis and given easily identifiable source , quick resolution of bacteremia and no other potential hardware for seeding he had a low pre test probability for Endocarditis and AUBREY is not warranted at this time   - Per ID 4 weeks of IV vancomycin , End Date 7/19/24     # Polysubstance abuse   - not in withdrawal     # GERD   - Continue PPI     # Hypokalemia   # Hypophosphatemia   - resolved after treatment     # Lovenox for DVT prophylaxis.
23 year old man with hx of Drug abuse ( crack cocaine and meth )  , admitted to the hospital with Lower back cellulitis and MRSA bacteremia     Impression and Plan  # MRSA Bacteremia   - source - back cellulitis   - Blood cx negative since 6/21 , TTE without evidence of Endocarditis and given easily identifiable source , quick resolution of bacteremia and no other potential hardware for seeding he had a low pre test probability for Endocarditis and AUBREY is not warranted at this time   - Per ID 4 weeks of IV vancomycin , End Date 7/19/24     # Polysubstance abuse   - not in withdrawal     # GERD   - Continue PPI     # Hypokalemia   # Hypophosphatemia   - resolved after treatment     # Lovenox for DVT prophylaxis.
resting in bed , no new complaints   on exam lungs clear , regular heart sounds , no new murmurs , no abd tenderness, no lower extremity edema ,     23 YOM with PMH of GERD, PSA (cocaine, THC, meth, no IVDA), homelessness admitted for sepsis 2/2 MRSA bacteremia (source likely skin; wound above gluteal cleft, drainage cx growing MRSA; folliculitis of extremities). Surveillance cultures negative, TTE w/o vegetations, pre-test probability not sufficiently high to warrant AUBREY. Plan for 4-week course of IV vancomycin per ID recommendations (EOT 7/19).
resting in bed , no new complaints   on exam lungs clear , regular heart sounds , no new murmurs , no abd tenderness, no lower extremity edema ,     23 YOM with PMH of GERD, PSA (cocaine, THC, meth, no IVDA), homelessness admitted for sepsis 2/2 MRSA bacteremia (source likely skin; wound above gluteal cleft, drainage cx growing MRSA; folliculitis of extremities). Surveillance cultures negative, TTE w/o vegetations,. Plan for 4-week course of IV vancomycin per ID recommendations (EOT 7/19).
23 YOM with PMH of GERD, PSA (cocaine, THC, meth, no IVDA), homelessness admitted for sepsis 2/2 MRSA bacteremia (source likely skin; wound above gluteal cleft, drainage cx growing MRSA; folliculitis of extremities). Surveillance cultures negative, TTE w/o vegetations, pre-test probability not sufficiently high to warrant AUBREY. Plan for 4-week course of IV vancomycin per ID recommendations (EOT 7/19).     Seen this morning resting in bed, feels well. Reports chronic intermittent right "cracking" sound when he stands, not painful to touch. Also c/o intermittent heart burn.     Exam - Non-toxic appearing, NAD. Skin lesions (above gluteal cleft + BLE) healing. R knee with FROM, non-tender, no edema, no warmth.     Dispo: homeless and uninsured, finish course of IV abx at [facility] with eventual discharge to shelter
23 year old man with hx of Drug abuse ( crack cocaine and meth )  , admitted to the hospital with Lower back cellulitis and MRSA bacteremia     Impression and Plan  # MRSA Bacteremia   - source - back cellulitis   - Blood cx negative since 6/21 , TTE without evidence of Endocarditis and given easily identifiable source , quick resolution of bacteremia and no other potential hardware for seeding he had a low pre test probability for Endocarditis and AUBREY is not warranted at this time   - Per ID 4 weeks of IV vancomycin , End Date 7/19/24     # Polysubstance abuse   - not in withdrawal     # GERD   - Continue PPI     # Hypokalemia   # Hypophosphatemia   - resolved after treatment     # Lovenox for DVT prophylaxis
23 year old man with hx of Drug abuse ( crack cocaine and meth ), admitted to the hospital with lower back cellulitis and MRSA bacteremia     Impression and Plan  # MRSA Bacteremia   - source - back cellulitis   - Blood cx negative since 6/21, TTE without evidence of Endocarditis and given easily identifiable source, quick resolution of bacteremia and no other potential hardware for seeding he had a low pre test probability for Endocarditis and AUBREY is not warranted at this time   - Per ID 4 weeks of IV vancomycin , End Date 7/19/24     # Polysubstance abuse   - not in withdrawal     # GERD   - Continue PPI     # Hypokalemia   # Hypophosphatemia   - resolved after treatment     # Lovenox for DVT prophylaxis
23 year old man with hx of Drug abuse ( crack cocaine and meth )  , admitted to the hospital with Lower back cellulitis and MRSA bacteremia     Impression and Plan  # MRSA Bacteremia   - source - back cellulitis   - Blood cx negative since 6/21 , TTE without evidence of Endocarditis and given easily identifiable source , quick resolution of bacteremia and no other potential hardware for seeding he had a low pre test probability for Endocarditis and AUBREY is not warranted at this time   - Per ID 4 weeks of IV vancomycin , End Date 7/19/24     # Polysubstance abuse   - not in withdrawal     # GERD   - Continue PPI     # Hypokalemia   # Hypophosphatemia   - resolved after treatment     # Lovenox for DVT prophylaxis     Intermittently gets agitated and refuses medication and labs ,  occasionally aggressive with Staff ,  Zyprexa 5mg IM/IV Every 8 Hours PRN agitation

## 2024-07-18 PROCEDURE — 99232 SBSQ HOSP IP/OBS MODERATE 35: CPT | Mod: GC

## 2024-07-18 RX ADMIN — Medication 30 MILLILITER(S): at 05:50

## 2024-07-18 RX ADMIN — Medication 30 MILLILITER(S): at 21:48

## 2024-07-18 RX ADMIN — Medication 400 MILLIGRAM(S): at 19:40

## 2024-07-18 RX ADMIN — NICOTINE POLACRILEX 1 PATCH: 2 LOZENGE ORAL at 19:40

## 2024-07-18 RX ADMIN — NICOTINE POLACRILEX 1 PATCH: 2 LOZENGE ORAL at 14:02

## 2024-07-18 RX ADMIN — ENOXAPARIN SODIUM 40 MILLIGRAM(S): 100 INJECTION SUBCUTANEOUS at 14:02

## 2024-07-18 RX ADMIN — Medication 400 MILLIGRAM(S): at 05:50

## 2024-07-18 RX ADMIN — PANTOPRAZOLE SODIUM 40 MILLIGRAM(S): 40 INJECTION, POWDER, FOR SOLUTION INTRAVENOUS at 06:08

## 2024-07-18 RX ADMIN — Medication 400 MILLIGRAM(S): at 18:05

## 2024-07-18 RX ADMIN — VANCOMYCIN HYDROCHLORIDE 166.67 MILLIGRAM(S): KIT at 14:02

## 2024-07-18 RX ADMIN — Medication 650 MILLIGRAM(S): at 00:55

## 2024-07-18 RX ADMIN — VANCOMYCIN HYDROCHLORIDE 166.67 MILLIGRAM(S): KIT at 21:48

## 2024-07-18 RX ADMIN — VANCOMYCIN HYDROCHLORIDE 166.67 MILLIGRAM(S): KIT at 05:50

## 2024-07-18 RX ADMIN — Medication 650 MILLIGRAM(S): at 09:13

## 2024-07-19 ENCOUNTER — TRANSCRIPTION ENCOUNTER (OUTPATIENT)
Age: 24
End: 2024-07-19

## 2024-07-19 VITALS
TEMPERATURE: 98 F | RESPIRATION RATE: 18 BRPM | HEART RATE: 80 BPM | DIASTOLIC BLOOD PRESSURE: 71 MMHG | OXYGEN SATURATION: 98 % | SYSTOLIC BLOOD PRESSURE: 115 MMHG

## 2024-07-19 PROCEDURE — 99239 HOSP IP/OBS DSCHRG MGMT >30: CPT | Mod: GC

## 2024-07-19 RX ADMIN — PANTOPRAZOLE SODIUM 40 MILLIGRAM(S): 40 INJECTION, POWDER, FOR SOLUTION INTRAVENOUS at 05:22

## 2024-07-19 RX ADMIN — Medication 400 MILLIGRAM(S): at 06:22

## 2024-07-19 RX ADMIN — Medication 400 MILLIGRAM(S): at 05:22

## 2024-07-19 RX ADMIN — Medication 30 MILLILITER(S): at 05:22

## 2024-07-19 RX ADMIN — VANCOMYCIN HYDROCHLORIDE 166.67 MILLIGRAM(S): KIT at 05:22

## 2024-07-19 NOTE — DISCHARGE NOTE NURSING/CASE MANAGEMENT/SOCIAL WORK - PATIENT PORTAL LINK FT
You can access the FollowMyHealth Patient Portal offered by [facility] by registering at the following website: [email or web address]. By joining [employer]’s FollowMyHealth portal, you will also be able to view your health information using other applications (apps) compatible with our system.

## 2024-07-19 NOTE — DISCHARGE NOTE NURSING/CASE MANAGEMENT/SOCIAL WORK - NSDCPEFALRISK_GEN_ALL_CORE
For information on Fall & Injury Prevention, visit: [email or web address] OR  [email or web address] OR  https://www.cdc.gov/steadi/patient.html

## 2024-07-23 PROBLEM — Z00.00 ENCOUNTER FOR PREVENTIVE HEALTH EXAMINATION: Status: ACTIVE | Noted: 2024-07-23

## 2024-07-24 ENCOUNTER — APPOINTMENT (OUTPATIENT)
Dept: INFECTIOUS DISEASE | Facility: CLINIC | Age: 24
End: 2024-07-24

## 2024-07-31 DIAGNOSIS — K21.9 GASTRO-ESOPHAGEAL REFLUX DISEASE WITHOUT ESOPHAGITIS: ICD-10-CM

## 2024-07-31 DIAGNOSIS — L73.9 FOLLICULAR DISORDER, UNSPECIFIED: ICD-10-CM

## 2024-07-31 DIAGNOSIS — E87.6 HYPOKALEMIA: ICD-10-CM

## 2024-07-31 DIAGNOSIS — E83.39 OTHER DISORDERS OF PHOSPHORUS METABOLISM: ICD-10-CM

## 2024-07-31 DIAGNOSIS — F19.20 OTHER PSYCHOACTIVE SUBSTANCE DEPENDENCE, UNCOMPLICATED: ICD-10-CM

## 2024-07-31 DIAGNOSIS — L03.312 CELLULITIS OF BACK [ANY PART EXCEPT BUTTOCK]: ICD-10-CM

## 2024-07-31 DIAGNOSIS — Z59.02 UNSHELTERED HOMELESSNESS: ICD-10-CM

## 2024-07-31 DIAGNOSIS — A41.02 SEPSIS DUE TO METHICILLIN RESISTANT STAPHYLOCOCCUS AUREUS: ICD-10-CM

## 2024-07-31 SDOH — ECONOMIC STABILITY - HOUSING INSECURITY: UNSHELTERED HOMELESSNESS: Z59.02

## 2024-08-22 PROCEDURE — 80307 DRUG TEST PRSMV CHEM ANLYZR: CPT

## 2024-08-22 PROCEDURE — 86706 HEP B SURFACE ANTIBODY: CPT

## 2024-08-22 PROCEDURE — 93306 TTE W/DOPPLER COMPLETE: CPT

## 2024-08-22 PROCEDURE — 87340 HEPATITIS B SURFACE AG IA: CPT

## 2024-08-22 PROCEDURE — 86803 HEPATITIS C AB TEST: CPT

## 2024-08-22 PROCEDURE — 87205 SMEAR GRAM STAIN: CPT

## 2024-08-22 PROCEDURE — 96366 THER/PROPH/DIAG IV INF ADDON: CPT

## 2024-08-22 PROCEDURE — 96375 TX/PRO/DX INJ NEW DRUG ADDON: CPT

## 2024-08-22 PROCEDURE — 80202 ASSAY OF VANCOMYCIN: CPT

## 2024-08-22 PROCEDURE — 85730 THROMBOPLASTIN TIME PARTIAL: CPT

## 2024-08-22 PROCEDURE — 86704 HEP B CORE ANTIBODY TOTAL: CPT

## 2024-08-22 PROCEDURE — 84100 ASSAY OF PHOSPHORUS: CPT

## 2024-08-22 PROCEDURE — 96368 THER/DIAG CONCURRENT INF: CPT

## 2024-08-22 PROCEDURE — 85025 COMPLETE CBC W/AUTO DIFF WBC: CPT

## 2024-08-22 PROCEDURE — 87593 ORTHOPOXVIRUS AMP PRB EACH: CPT

## 2024-08-22 PROCEDURE — 83605 ASSAY OF LACTIC ACID: CPT

## 2024-08-22 PROCEDURE — 87040 BLOOD CULTURE FOR BACTERIA: CPT

## 2024-08-22 PROCEDURE — 81003 URINALYSIS AUTO W/O SCOPE: CPT

## 2024-08-22 PROCEDURE — 87070 CULTURE OTHR SPECIMN AEROBIC: CPT

## 2024-08-22 PROCEDURE — 96365 THER/PROPH/DIAG IV INF INIT: CPT

## 2024-08-22 PROCEDURE — 80048 BASIC METABOLIC PNL TOTAL CA: CPT

## 2024-08-22 PROCEDURE — 85027 COMPLETE CBC AUTOMATED: CPT

## 2024-08-22 PROCEDURE — 85610 PROTHROMBIN TIME: CPT

## 2024-08-22 PROCEDURE — 83735 ASSAY OF MAGNESIUM: CPT

## 2024-08-22 PROCEDURE — 87186 SC STD MICRODIL/AGAR DIL: CPT

## 2024-08-22 PROCEDURE — 86705 HEP B CORE ANTIBODY IGM: CPT

## 2024-08-22 PROCEDURE — 36415 COLL VENOUS BLD VENIPUNCTURE: CPT

## 2024-08-22 PROCEDURE — 87637 SARSCOV2&INF A&B&RSV AMP PRB: CPT

## 2024-08-22 PROCEDURE — 99285 EMERGENCY DEPT VISIT HI MDM: CPT

## 2024-08-22 PROCEDURE — 86780 TREPONEMA PALLIDUM: CPT

## 2024-08-22 PROCEDURE — 83516 IMMUNOASSAY NONANTIBODY: CPT

## 2024-08-22 PROCEDURE — 72132 CT LUMBAR SPINE W/DYE: CPT | Mod: MC

## 2024-08-22 PROCEDURE — 86709 HEPATITIS A IGM ANTIBODY: CPT

## 2024-08-22 PROCEDURE — 71045 X-RAY EXAM CHEST 1 VIEW: CPT

## 2024-08-22 PROCEDURE — 80053 COMPREHEN METABOLIC PANEL: CPT

## 2024-08-22 PROCEDURE — 87077 CULTURE AEROBIC IDENTIFY: CPT

## 2024-08-22 PROCEDURE — 87389 HIV-1 AG W/HIV-1&-2 AB AG IA: CPT

## 2024-08-22 PROCEDURE — 87150 DNA/RNA AMPLIFIED PROBE: CPT
